# Patient Record
Sex: MALE | Race: WHITE | NOT HISPANIC OR LATINO | Employment: FULL TIME | URBAN - METROPOLITAN AREA
[De-identification: names, ages, dates, MRNs, and addresses within clinical notes are randomized per-mention and may not be internally consistent; named-entity substitution may affect disease eponyms.]

---

## 2017-01-13 ENCOUNTER — OFFICE VISIT (OUTPATIENT)
Dept: SURGERY | Facility: CLINIC | Age: 42
End: 2017-01-13

## 2017-01-13 VITALS
DIASTOLIC BLOOD PRESSURE: 88 MMHG | BODY MASS INDEX: 31.7 KG/M2 | SYSTOLIC BLOOD PRESSURE: 112 MMHG | WEIGHT: 214 LBS | HEIGHT: 69 IN

## 2017-01-13 DIAGNOSIS — R11.0 NAUSEA: ICD-10-CM

## 2017-01-13 DIAGNOSIS — R10.11 RIGHT UPPER QUADRANT ABDOMINAL PAIN: Primary | ICD-10-CM

## 2017-01-13 PROCEDURE — 99213 OFFICE O/P EST LOW 20 MIN: CPT | Performed by: SURGERY

## 2017-01-13 NOTE — LETTER
2017     Fernando Iverson MD  [address]  [facility] [address]    Patient: Elvis Vallejo   YOB: 1975   Date of Visit: 2017       Dear Dr. Kishor MD:    Thank you for referring Elvis Vallejo to me for evaluation. Below are the relevant portions of my assessment and plan of care.    If you have questions, please do not hesitate to call me. I look forward to following Elvis along with you.         Sincerely,        Tricia Addison MD        CC: No Recipients  Tricia Addison MD  2017  3:52 PM  Sign at close encounter      PATIENT INFORMATION  Elvis Vallejo   - 1975    CHIEF COMPLAINT  1 MO FU, still with abdominal pain and cramping.    Reflux - improved    HISTORY OF PRESENT ILLNESS  HPI    Patient presents to the office today for follow-up.  He has GERD and Guillermo's esophagitis.  He has been on Taxol and.  He reports he is completed Carafate.  He states that his reflux and nausea significantly improved and reflux symptoms are well-controlled with excellent.  He is still however experiencing right upper quadrant abdominal pain, bloating and cramping.  He does not associate any particular precipitating or alleviating factors.  There is no relation to food.  He reports regular bowel movements.  Denies melena, hematochezia or urinary symptoms.    REVIEW OF SYSTEMS  Review of Systems   Constitutional: Positive for appetite change. Negative for activity change and unexpected weight change.   HENT: Negative.    Respiratory: Negative.    Cardiovascular: Negative.    Gastrointestinal: Positive for abdominal distention and abdominal pain. Negative for blood in stool, constipation, diarrhea and nausea.   Genitourinary: Negative.    Musculoskeletal: Negative.          ACTIVE PROBLEMS  Patient Active Problem List    Diagnosis   • Right upper quadrant abdominal pain [R10.11]   • Nausea & vomiting [R11.2]   • Bloating [R14.0]   • Hypertension [I10]   • Epididymitis [N45.1]         PAST  "MEDICAL HISTORY  Past Medical History   Diagnosis Date   • GERD (gastroesophageal reflux disease)    • Hyperlipidemia    • Hypertension          SURGICAL HISTORY  Past Surgical History   Procedure Laterality Date   • [place] tooth extraction     • Endoscopy N/A 10/27/2016     Procedure: ESOPHAGOGASTRODUODENOSCOPY WITH BILE COLLECTION / RANDOM GASTRIC BIOPSIES,DUODENAL AND DISTAL ESOPHAGEAL BIOPSIES  AND ALFREDO TEST BIOPSY;  Surgeon: Tricia Addison MD;  Location: [facility];  Service:          FAMILY HISTORY  Family History   Problem Relation Age of Onset   • Heart attack Paternal Grandmother    • Hypertension Paternal Grandfather    • Heart attack Paternal Grandfather          SOCIAL HISTORY  Social History     Occupational History   • Not on file.     Social History Main Topics   • Smoking status: Light Tobacco Smoker     Years: 10.00     Types: Cigarettes   • Smokeless tobacco: Not on file      Comment: monthly   • Alcohol use 1.8 - 2.4 oz/week     3 - 4 Cans of beer per week      Comment: 3-4/WEEKS   • Drug use: No   • Sexual activity: Defer         CURRENT MEDICATIONS    Current Outpatient Prescriptions:   •  atorvastatin (LIPITOR) 20 MG tablet, Take 20 mg by mouth Daily., Disp: , Rfl:   •  Dexlansoprazole (DEXILANT PO), Take 60 mg by mouth Daily., Disp: , Rfl:   •  lisinopril-hydrochlorothiazide (PRINZIDE,ZESTORETIC) 20-12.5 MG per tablet, Take 1 tablet by mouth Daily., Disp: , Rfl:     ALLERGIES  Review of patient's allergies indicates no known allergies.    VITALS  Vitals:    01/13/17 0913   BP: 112/88   Weight: 214 lb (97.1 kg)   Height: 69\" (175.3 cm)       LAST RESULTS   Admission on 10/27/2016, Discharged on 10/27/2016   Component Date Value Ref Range Status   • Case Report 10/27/2016    Final                    Value:Surgical Pathology Report                         Case: [id number]                                  Authorizing Provider:  Tricia Addison MD       Collected:           10/27/2016 08:24 AM   "        Ordering Location:     [facility]   Received:            10/27/2016 09:19 AM                                 OR                                                                           Pathologist:           Sang Bentley MD                                                      Specimens:   1) - Esophagus, Distal, DISTAL ESOPHAGUS BIOPSY                                                     2) - Small Intestine, Duodenum, duodenum  biopsy                                                    3) - Gastric, Body, randum gastric biopsies                                               • Final Diagnosis 10/27/2016    Final                    Value:This result contains rich text formatting which cannot be displayed here.   • Urease 10/27/2016 Positive* Negative Final   • Crystals, Bile (Reference) 10/27/2016 Comment  None Seen Final    No crystals seen under normal or polarized light.     No results found.    PHYSICAL EXAM  Physical Exam   Constitutional: He is oriented to person, place, and time. He appears well-developed and well-nourished.   HENT:   Head: Normocephalic and atraumatic.   Eyes: No scleral icterus.   Neck: Normal range of motion. Neck supple.   Cardiovascular: Normal rate, regular rhythm and normal heart sounds.    Pulmonary/Chest: Breath sounds normal.   Abdominal:   Soft, nondistended, subjective tenderness to deep palpation right upper quadrant.  No Bustillo sign.  Positive bowel sounds in all 4 quadrants   Musculoskeletal: He exhibits no edema.   Lymphadenopathy:     He has no cervical adenopathy.   Neurological: He is alert and oriented to person, place, and time.   Skin: Skin is warm and dry.   Psychiatric: He has a normal mood and affect. His behavior is normal.   Nursing note and vitals reviewed.      ASSESSMENT  Abdominal pain-right upper quadrant  Ultrasound gallbladder negative.  HIDA scan with CCK showed gallbladder ejection fraction of 51%.  Have discussed with patient  will obtain CT scan abdomen pelvis with oral and IV contrast.       GERD  H pylori gastritis  Guillermo's esophagitis -- continue PPI.  GERD dietary last on modification discussed.  Caffeine, nicotine, alcohol cessation discussed.  Patient is agreeable.      PLAN  Follow-up after testing is completed.  Patient was advised to call the office sooner should he have worsening symptoms or go to the nearest emergency room.

## 2017-01-13 NOTE — MR AVS SNAPSHOT
Elvis Vallejo   1/13/2017 9:15 AM   Office Visit    Dept Phone:  [phone]   Encounter #:  [id number]    Provider:  Tricia Addison MD   Department:  [facility] GENERAL SURGERY                Your Full Care Plan              Your Updated Medication List          This list is accurate as of: 1/13/17  9:36 AM.  Always use your most recent med list.                atorvastatin 20 MG tablet   Commonly known as:  LIPITOR       DEXILANT PO       lisinopril-hydrochlorothiazide 20-12.5 MG per tablet   Commonly known as:  PRINZIDE,ZESTORETIC               Instructions     None    Patient Instructions History      Upcoming Appointments     Visit Type Date Time Department    OFFICE VISIT 1/13/2017  9:15 AM [facility]    OFFICE VISIT 1/20/2017 11:15 AM [facility]      [email or web address] Signup     Our records indicate that you have an active [employer] account.    You can view your After Visit Summary by going to [employer] and logging in with your [employer] username and password.  If you don't have a Treatful username and password but a parent or guardian has access to your record, the parent or guardian should login with their own Treatful username and password and access your record to view the After Visit Summary.    If you have questions, you can email [email or web address] or call [phone] to talk to our Treatful staff.  Remember, [employer] is NOT to be used for urgent needs.  For medical emergencies, dial 911.               Other Info from Your Visit           Your Appointments     Jan 20, 2017 11:15 AM EST   Office Visit with Tricia Addison MD   [facility] GENERAL SURGERY (--)    [address]  [place] [address] [phone]           Arrive 15 minutes prior to appointment.              Allergies     No Known Allergies      Reason for Visit     Follow-up           Vital Signs     Blood  "Pressure Height Weight Body Mass Index Smoking Status       112/88 69\" (175.3 cm) 214 lb (97.1 kg) 31.6 kg/m2 Light Tobacco Smoker       Problems and Diagnoses Noted     Epididymitis    High blood pressure        "

## 2017-01-13 NOTE — PROGRESS NOTES
PATIENT INFORMATION  Elvis Vallejo   - 1975    CHIEF COMPLAINT  1 MO FU, still with abdominal pain and cramping.    Reflux - improved    HISTORY OF PRESENT ILLNESS  HPI    Patient presents to the office today for follow-up.  He has GERD and Guillermo's esophagitis.  He has been on Taxol and.  He reports he is completed Carafate.  He states that his reflux and nausea significantly improved and reflux symptoms are well-controlled with excellent.  He is still however experiencing right upper quadrant abdominal pain, bloating and cramping.  He does not associate any particular precipitating or alleviating factors.  There is no relation to food.  He reports regular bowel movements.  Denies melena, hematochezia or urinary symptoms.    REVIEW OF SYSTEMS  Review of Systems   Constitutional: Positive for appetite change. Negative for activity change and unexpected weight change.   HENT: Negative.    Respiratory: Negative.    Cardiovascular: Negative.    Gastrointestinal: Positive for abdominal distention and abdominal pain. Negative for blood in stool, constipation, diarrhea and nausea.   Genitourinary: Negative.    Musculoskeletal: Negative.          ACTIVE PROBLEMS  Patient Active Problem List    Diagnosis   • Right upper quadrant abdominal pain [R10.11]   • Nausea & vomiting [R11.2]   • Bloating [R14.0]   • Hypertension [I10]   • Epididymitis [N45.1]         PAST MEDICAL HISTORY  Past Medical History   Diagnosis Date   • GERD (gastroesophageal reflux disease)    • Hyperlipidemia    • Hypertension          SURGICAL HISTORY  Past Surgical History   Procedure Laterality Date   • [place] tooth extraction     • Endoscopy N/A 10/27/2016     Procedure: ESOPHAGOGASTRODUODENOSCOPY WITH BILE COLLECTION / RANDOM GASTRIC BIOPSIES,DUODENAL AND DISTAL ESOPHAGEAL BIOPSIES  AND ALFREDO TEST BIOPSY;  Surgeon: Tricia Addison MD;  Location: [facility];  Service:          FAMILY HISTORY  Family History   Problem Relation Age of Onset  "  • Heart attack Paternal Grandmother    • Hypertension Paternal Grandfather    • Heart attack Paternal Grandfather          SOCIAL HISTORY  Social History     Occupational History   • Not on file.     Social History Main Topics   • Smoking status: Light Tobacco Smoker     Years: 10.00     Types: Cigarettes   • Smokeless tobacco: Not on file      Comment: monthly   • Alcohol use 1.8 - 2.4 oz/week     3 - 4 Cans of beer per week      Comment: 3-4/WEEKS   • Drug use: No   • Sexual activity: Defer         CURRENT MEDICATIONS    Current Outpatient Prescriptions:   •  atorvastatin (LIPITOR) 20 MG tablet, Take 20 mg by mouth Daily., Disp: , Rfl:   •  Dexlansoprazole (DEXILANT PO), Take 60 mg by mouth Daily., Disp: , Rfl:   •  lisinopril-hydrochlorothiazide (PRINZIDE,ZESTORETIC) 20-12.5 MG per tablet, Take 1 tablet by mouth Daily., Disp: , Rfl:     ALLERGIES  Review of patient's allergies indicates no known allergies.    VITALS  Vitals:    01/13/17 0913   BP: 112/88   Weight: 214 lb (97.1 kg)   Height: 69\" (175.3 cm)       LAST RESULTS   Admission on 10/27/2016, Discharged on 10/27/2016   Component Date Value Ref Range Status   • Case Report 10/27/2016    Final                    Value:Surgical Pathology Report                         Case: [id number]                                  Authorizing Provider:  Tricia Addison MD       Collected:           10/27/2016 08:24 AM          Ordering Location:     [facility]   Received:            10/27/2016 09:19 AM                                 OR                                                                           Pathologist:           Sang Bentley MD                                                      Specimens:   1) - Esophagus, Distal, DISTAL ESOPHAGUS BIOPSY                                                     2) - Small Intestine, Duodenum, duodenum  biopsy                                                    3) - Gastric, Body, randum gastric " biopsies                                               • Final Diagnosis 10/27/2016    Final                    Value:This result contains rich text formatting which cannot be displayed here.   • Urease 10/27/2016 Positive* Negative Final   • Crystals, Bile (Reference) 10/27/2016 Comment  None Seen Final    No crystals seen under normal or polarized light.     No results found.    PHYSICAL EXAM  Physical Exam   Constitutional: He is oriented to person, place, and time. He appears well-developed and well-nourished.   HENT:   Head: Normocephalic and atraumatic.   Eyes: No scleral icterus.   Neck: Normal range of motion. Neck supple.   Cardiovascular: Normal rate, regular rhythm and normal heart sounds.    Pulmonary/Chest: Breath sounds normal.   Abdominal:   Soft, nondistended, subjective tenderness to deep palpation right upper quadrant.  No Bustillo sign.  Positive bowel sounds in all 4 quadrants   Musculoskeletal: He exhibits no edema.   Lymphadenopathy:     He has no cervical adenopathy.   Neurological: He is alert and oriented to person, place, and time.   Skin: Skin is warm and dry.   Psychiatric: He has a normal mood and affect. His behavior is normal.   Nursing note and vitals reviewed.      ASSESSMENT  Abdominal pain-right upper quadrant  Ultrasound gallbladder negative.  HIDA scan with CCK showed gallbladder ejection fraction of 51%.  Have discussed with patient will obtain CT scan abdomen pelvis with oral and IV contrast.       GERD  H pylori gastritis  Guillermo's esophagitis -- continue PPI.  GERD dietary last on modification discussed.  Caffeine, nicotine, alcohol cessation discussed.  Patient is agreeable.      PLAN  Follow-up after testing is completed.  Patient was advised to call the office sooner should he have worsening symptoms or go to the nearest emergency room.

## 2017-01-23 ENCOUNTER — HOSPITAL ENCOUNTER (OUTPATIENT)
Dept: CT IMAGING | Facility: HOSPITAL | Age: 42
Discharge: HOME | End: 2017-01-23
Attending: SURGERY | Admitting: SURGERY

## 2017-01-23 DIAGNOSIS — R11.0 NAUSEA: ICD-10-CM

## 2017-01-23 DIAGNOSIS — R10.11 RIGHT UPPER QUADRANT ABDOMINAL PAIN: ICD-10-CM

## 2017-01-23 PROCEDURE — 25510000001 DIATRIZOATE MEGLUMINE & SODIUM PER 1 ML: Performed by: SURGERY

## 2017-01-23 PROCEDURE — 0 IOPAMIDOL 61 % SOLUTION: Performed by: SURGERY

## 2017-01-23 PROCEDURE — 74177 CT ABD & PELVIS W/CONTRAST: CPT

## 2017-01-23 PROCEDURE — 82565 ASSAY OF CREATININE: CPT

## 2017-01-23 RX ADMIN — IOPAMIDOL 85 ML: 612 INJECTION, SOLUTION INTRAVENOUS at 09:15

## 2017-01-23 RX ADMIN — DIATRIZOATE MEGLUMINE AND DIATRIZOATE SODIUM 30 ML: 660; 100 LIQUID ORAL; RECTAL at 08:00

## 2017-01-24 ENCOUNTER — OFFICE VISIT (OUTPATIENT)
Dept: SURGERY | Facility: CLINIC | Age: 42
End: 2017-01-24

## 2017-01-24 VITALS
HEIGHT: 69 IN | BODY MASS INDEX: 30.96 KG/M2 | DIASTOLIC BLOOD PRESSURE: 78 MMHG | WEIGHT: 209 LBS | SYSTOLIC BLOOD PRESSURE: 118 MMHG

## 2017-01-24 DIAGNOSIS — Z09 FOLLOW UP: Primary | ICD-10-CM

## 2017-01-24 LAB — CREAT BLDA-MCNC: 0.8 MG/DL (ref 0.6–1.3)

## 2017-01-24 PROCEDURE — 99213 OFFICE O/P EST LOW 20 MIN: CPT | Performed by: SURGERY

## 2017-01-24 RX ORDER — DICYCLOMINE HYDROCHLORIDE 10 MG/1
10 CAPSULE ORAL 4 TIMES DAILY
Qty: 120 CAPSULE | Refills: 0 | Status: SHIPPED | OUTPATIENT
Start: 2017-01-24 | End: 2017-02-23

## 2017-01-24 NOTE — MR AVS SNAPSHOT
Elvis Vallejo   1/24/2017 2:45 PM   Office Visit    Dept Phone:  [phone]   Encounter #:  [id number]    Provider:  Tricia Addison MD   Department:  [facility] GENERAL SURGERY                Your Full Care Plan              Today's Medication Changes          These changes are accurate as of: 1/24/17  3:27 PM.  If you have any questions, ask your nurse or doctor.               New Medication(s)Ordered:     dicyclomine 10 MG capsule   Commonly known as:  BENTYL   Take 1 capsule by mouth 4 (Four) Times a Day for 30 days.   Started by:  Tricia Addison MD            Where to Get Your Medications      These medications were sent to [id number] [place] [address] AT [facility] - [phone]  - [phone] [address] [address]     Phone:  [phone]     dicyclomine 10 MG capsule                  Your Updated Medication List          This list is accurate as of: 1/24/17  3:27 PM.  Always use your most recent med list.                atorvastatin 20 MG tablet   Commonly known as:  LIPITOR       DEXILANT PO       dicyclomine 10 MG capsule   Commonly known as:  BENTYL   Take 1 capsule by mouth 4 (Four) Times a Day for 30 days.       lisinopril-hydrochlorothiazide 20-12.5 MG per tablet   Commonly known as:  PRINZIDE,ZESTORETIC               Instructions     None    Patient Instructions History      Upcoming Appointments     Visit Type Date Time Department    FOLLOW UP 1/24/2017  2:45 PM [facility]      [employer] Signup     Our records indicate that you have an active O[employer]is[employer] account.    You can view your After Visit Summary by going to [employer] and logging in with your [employer] username and password.  If you don't have a [employer] username and password but a parent or guardian has access to your record, the parent or guardian should login with their own  "[employer] username and password and access your record to view the After Visit Summary.    If you have questions, you can email [email or web address] or call [phone] to talk to our [employer] staff.  Remember, [employer] is NOT to be used for urgent needs.  For medical emergencies, dial 911.               Other Info from Your Visit           Allergies     No Known Allergies      Reason for Visit     Follow-up           Vital Signs     Blood Pressure Height Weight Body Mass Index Smoking Status       118/78 69\" (175.3 cm) 209 lb (94.8 kg) 30.86 kg/m2 Light Tobacco Smoker         "

## 2017-01-24 NOTE — PROGRESS NOTES
PATIENT INFORMATION  Elvis Vallejo   - 1975    CHIEF COMPLAINT  F/U ABD PAIN, CT DONE, SX UNCHANGED SINCE LAST OV    HISTORY OF PRESENT ILLNESS  HPI  Patient was advised the office today to discuss CT scan results.  He is now complaining of generalized abdominal pain.  At the last office visit he was complaining of right upper quadrant abdominal pain along with bloating and indigestion.  Today he reports that the abdominal pain is mostly generalized, it is associated with bloating and indigestion.  He denies any nausea vomiting change in bowel movements melena hematochezia.  His reflux symptoms are significantly improved and well controlled under excellent.  He has significantly modified his diet and decreased his caffeine and alcohol/nicotine consumption.    CT scan was reviewed personally by me and I have discussed it in detail with the patient.  Essentially a negative study other than bilateral small renal cysts..      REVIEW OF SYSTEMS  Review of Systems   Constitutional: Negative.    Respiratory: Negative.    Cardiovascular: Negative.    Gastrointestinal: Positive for abdominal distention and abdominal pain.         ACTIVE PROBLEMS  Patient Active Problem List    Diagnosis   • Right upper quadrant abdominal pain [R10.11]   • Nausea & vomiting [R11.2]   • Bloating [R14.0]   • Hypertension [I10]   • Epididymitis [N45.1]         PAST MEDICAL HISTORY  Past Medical History   Diagnosis Date   • GERD (gastroesophageal reflux disease)    • Hyperlipidemia    • Hypertension          SURGICAL HISTORY  Past Surgical History   Procedure Laterality Date   • [place] tooth extraction     • Endoscopy N/A 10/27/2016     Procedure: ESOPHAGOGASTRODUODENOSCOPY WITH BILE COLLECTION / RANDOM GASTRIC BIOPSIES,DUODENAL AND DISTAL ESOPHAGEAL BIOPSIES  AND ALFREDO TEST BIOPSY;  Surgeon: Tricia Addison MD;  Location: [facility];  Service:          FAMILY HISTORY  Family History   Problem Relation Age of Onset   • Heart attack  "Paternal Grandmother    • Hypertension Paternal Grandfather    • Heart attack Paternal Grandfather          SOCIAL HISTORY  Social History     Occupational History   • Not on file.     Social History Main Topics   • Smoking status: Light Tobacco Smoker     Years: 10.00     Types: Cigarettes   • Smokeless tobacco: Not on file      Comment: monthly   • Alcohol use 1.8 - 2.4 oz/week     3 - 4 Cans of beer per week      Comment: 3-4/WEEKS   • Drug use: No   • Sexual activity: Defer         CURRENT MEDICATIONS    Current Outpatient Prescriptions:   •  atorvastatin (LIPITOR) 20 MG tablet, Take 20 mg by mouth Daily., Disp: , Rfl:   •  Dexlansoprazole (DEXILANT PO), Take 60 mg by mouth Daily., Disp: , Rfl:   •  dicyclomine (BENTYL) 10 MG capsule, Take 1 capsule by mouth 4 (Four) Times a Day for 30 days., Disp: 120 capsule, Rfl: 0  •  lisinopril-hydrochlorothiazide (PRINZIDE,ZESTORETIC) 20-12.5 MG per tablet, Take 1 tablet by mouth Daily., Disp: , Rfl:     ALLERGIES  Review of patient's allergies indicates no known allergies.    VITALS  Vitals:    01/24/17 1437   BP: 118/78   Weight: 209 lb (94.8 kg)   Height: 69\" (175.3 cm)       LAST RESULTS   Hospital Outpatient Visit on 01/23/2017   Component Date Value Ref Range Status   • Creatinine 01/23/2017 0.80  0.60 - 1.30 mg/dL Final    Serial Number: [id number]    : [id number]     Ct Abdomen Pelvis With Contrast    Result Date: 1/24/2017  Narrative: CT SCAN OF THE ABDOMEN AND PELVIS WITH CONTRAST  CLINICAL HISTORY: Right upper quadrant pain.  TECHNIQUE: Spiral CT images were acquired through the abdomen and pelvis with oral and IV contrast and were reconstructed in 3 mm thick axial slices.  COMPARISON: None  FINDINGS: The liver, spleen, pancreas, and adrenal glands are unremarkable. There is a small central cyst in the upper pole of the right kidney. Tiny cortical cyst is also noted in the lower pole of the left kidney. The kidneys are otherwise unremarkable. The gallbladder " appears within normal limits. There is no bile duct dilatation. The stomach and small and large bowel are unremarkable. No abnormal masses or fluid collections are identified in the abdomen and pelvis.      Impression: Small bilateral renal cysts. Otherwise unremarkable CT scan of the abdomen and pelvis. No acute process is identified.  This report was finalized on 1/24/2017 1:49 PM by Dr. Magen Stephenson MD.        PHYSICAL EXAM  Physical Exam   Constitutional: He is oriented to person, place, and time. He appears well-developed and well-nourished.   HENT:   Head: Normocephalic and atraumatic.   Eyes: EOM are normal. No scleral icterus.   Neck: Normal range of motion. Neck supple.   Cardiovascular: Normal rate, regular rhythm and normal heart sounds.    Pulmonary/Chest: Breath sounds normal.   Abdominal:   Soft, nondistended, nontender with bowel sounds in all 4 quadrants.  No peritoneal signs   Musculoskeletal: He exhibits no edema.   Lymphadenopathy:     He has no cervical adenopathy.   Neurological: He is alert and oriented to person, place, and time.   Nursing note and vitals reviewed.      ASSESSMENT  Abdominal pain -- cramping/spasms  Indigestion/bloating   Thus far workup negative.  Patient has had an ultrasound, HIDA scan with CCK stimulation and CT scan that have all been negative for any acute pathology.  We discussed a gastric emptying scan versus a trial of Bentyl.  Patient is agreeable to trial of Bentyl for one month.  Bentyl 10 mg by mouth 4 times a day, 30 day supply no refills was e- scribed to his pharmacy.    GERD  H pylori gastritis  Guillermo's esophagitis -- continue PPI.  GERD dietary last on modification discussed. Caffeine, nicotine, alcohol cessation discussed. Patient is agreeable.    PLAN  Follow-up one month.  Patient was advised to call the office sooner should he have worsening symptoms or go to the nearest emergency room.

## 2017-02-21 ENCOUNTER — TELEPHONE (OUTPATIENT)
Dept: SURGERY | Facility: CLINIC | Age: 42
End: 2017-02-21

## 2017-02-21 NOTE — TELEPHONE ENCOUNTER
I spoke with someone at Dr. Iverson's office, she stated she had tried to get a PA on this medication with no luck, but she was going to try and appeal it. I called the pt to let him know, had to leave a VM.    ----- Message from Tricia Addison MD sent at 2/21/2017  4:01 PM EST -----  Contact: [phone]  Since Dr Iverson has been prescribing it -- it would have to come from his office. Plz talk to their office  thx  ----- Message -----     From: Mackenzie Queen MA     Sent: 2/21/2017   2:53 PM       To: Tricia Addison MD    WOULD YOU BE WILLING TO PRESCRIBE THE DEXILANT?    ----- Message -----     From: Ronna Abernathy     Sent: 2/20/2017   3:23 PM       To: Mackenzie Queen MA    Patient said he needs a PA on his Dexilant. Dr. Iverson told him to call us and see if you would prescribe it to him and us get the PA.

## 2017-02-27 ENCOUNTER — OFFICE VISIT (OUTPATIENT)
Dept: SURGERY | Facility: CLINIC | Age: 42
End: 2017-02-27

## 2017-02-27 VITALS
BODY MASS INDEX: 31.1 KG/M2 | HEIGHT: 69 IN | SYSTOLIC BLOOD PRESSURE: 128 MMHG | WEIGHT: 210 LBS | DIASTOLIC BLOOD PRESSURE: 82 MMHG

## 2017-02-27 DIAGNOSIS — R10.9 ABDOMINAL CRAMPING: ICD-10-CM

## 2017-02-27 DIAGNOSIS — R10.84 GENERALIZED ABDOMINAL PAIN: Primary | ICD-10-CM

## 2017-02-27 PROCEDURE — 99213 OFFICE O/P EST LOW 20 MIN: CPT | Performed by: SURGERY

## 2017-02-27 NOTE — PROGRESS NOTES
PATIENT INFORMATION  Elvis Vallejo   - 1975    CHIEF COMPLAINT  1 MO FU, SX UNCHANGED      HISTORY OF PRESENT ILLNESS  HPI  Patient presents to the office today for follow-up.  He reports he is still complete experiencing abdominal pain, cramping and bloating he reports that he has been the using the been told that was prescribed to him last month but it doesn't seem to help.  He cannot associate any particular foods that may trigger his symptoms.  There are no aggravating or relieving factors.  He denies any nausea vomiting.  He does report that his bowel movements are irregular denies melena hematochezia.  As far as acid reflux is concerned is well-controlled on Dexilant.    He has modified his diet significantly and cut down on caffeine and nicotine and alcohol.  His major complaint is that of the cramping abdominal pain and bloating with the irregular bowel movements.  He has never had a colonoscopy before.  He reports he is unaware of any family history of colon cancer or colon polyps.    REVIEW OF SYSTEMS  Review of Systems   Constitutional: Negative for activity change, appetite change and unexpected weight change.   Respiratory: Negative.    Cardiovascular: Negative.    Gastrointestinal: Positive for abdominal distention, abdominal pain, constipation and diarrhea.   Endocrine: Negative.    Genitourinary: Negative.    Musculoskeletal: Negative.    Skin: Negative.    Neurological: Negative.    Hematological: Negative.    Psychiatric/Behavioral: Negative.          ACTIVE PROBLEMS  Patient Active Problem List    Diagnosis   • Right upper quadrant abdominal pain [R10.11]   • Nausea & vomiting [R11.2]   • Bloating [R14.0]   • Hypertension [I10]   • Epididymitis [N45.1]         PAST MEDICAL HISTORY  Past Medical History   Diagnosis Date   • GERD (gastroesophageal reflux disease)    • Hyperlipidemia    • Hypertension          SURGICAL HISTORY  Past Surgical History   Procedure Laterality Date   • [place]  "tooth extraction     • Endoscopy N/A 10/27/2016     Procedure: ESOPHAGOGASTRODUODENOSCOPY WITH BILE COLLECTION / RANDOM GASTRIC BIOPSIES,DUODENAL AND DISTAL ESOPHAGEAL BIOPSIES  AND ALFREDO TEST BIOPSY;  Surgeon: Tricia Addison MD;  Location: [facility];  Service:          FAMILY HISTORY  Family History   Problem Relation Age of Onset   • Heart attack Paternal Grandmother    • Hypertension Paternal Grandfather    • Heart attack Paternal Grandfather          SOCIAL HISTORY  Social History     Occupational History   • Not on file.     Social History Main Topics   • Smoking status: Light Tobacco Smoker     Years: 10.00     Types: Cigarettes   • Smokeless tobacco: Not on file      Comment: monthly   • Alcohol use 1.8 - 2.4 oz/week     3 - 4 Cans of beer per week      Comment: 3-4/WEEKS   • Drug use: No   • Sexual activity: Defer         CURRENT MEDICATIONS    Current Outpatient Prescriptions:   •  atorvastatin (LIPITOR) 20 MG tablet, Take 20 mg by mouth Daily., Disp: , Rfl:   •  Dexlansoprazole (DEXILANT PO), Take 60 mg by mouth Daily., Disp: , Rfl:   •  lisinopril-hydrochlorothiazide (PRINZIDE,ZESTORETIC) 20-12.5 MG per tablet, Take 1 tablet by mouth Daily., Disp: , Rfl:     ALLERGIES  Review of patient's allergies indicates no known allergies.    VITALS  Vitals:    02/27/17 0850   BP: 128/82   Weight: 210 lb (95.3 kg)   Height: 69\" (175.3 cm)       LAST RESULTS   Hospital Outpatient Visit on 01/23/2017   Component Date Value Ref Range Status   • Creatinine 01/23/2017 0.80  0.60 - 1.30 mg/dL Final    Serial Number: [id number]    : [id number]     No results found.    PHYSICAL EXAM  Physical Exam   Constitutional: He is oriented to person, place, and time. He appears well-developed and well-nourished.   HENT:   Head: Normocephalic and atraumatic.   Eyes: No scleral icterus.   Neck: Normal range of motion. Neck supple.   Cardiovascular: Normal rate, regular rhythm and normal heart sounds.    Pulmonary/Chest: Breath sounds " normal.   Abdominal:   Soft, nondistended, nontender positive bowel sounds in all 4 quadrants   Lymphadenopathy:     He has no cervical adenopathy.   Neurological: He is alert and oriented to person, place, and time.   Skin: Skin is warm and dry.   Psychiatric: He has a normal mood and affect. His behavior is normal.   Nursing note and vitals reviewed.      ASSESSMENT  Abdominal pain -- cramping/spasms  Indigestion/bloating   Thus far workup negative.(Patient has had an ultrasound, HIDA scan with CCK stimulation and CT scan that have all been negative for any acute pathology).     Patient reports Bentyl did not help either.  Had a lengthy discussion -- can continue to pursue this further with a colonoscopy and if that is negative gastric emptying scan.  If all workup is negative then I think the patient will need to be seen by GI to rule out irritable bowel syndrome.  I have discussed this with him in detail.  Pros and cons risks and benefits discussed he is agreeable and wants to proceed with colonoscopy at this time.  The procedure, risks, benefits, complications including but not limited to risk of bleeding, perforation requiring emergent procedures, post-polypectomy bleeding, post-polypectomy syndrome, cardiopulmonary complications were thoroughly discussed with him patient understands and gave verbal informed consent.  Colonoscopy will be scheduled [facility].  Bowel prep instructions were provided.  He will have to stay off all blood thinners for at least 5-7 days prior to the procedure.     GERD  H pylori gastritis  Guillermo's esophagitis -- continue PPI.  GERD dietary last on modification discussed. Caffeine, nicotine, alcohol cessation discussed. Patient is agreeable.      PLAN  Schedule colonoscopy [place].  Follow-up after colonoscopy.  Patient was advised to call the office sooner should he have worsening symptoms or go to the nearest emergency room.

## 2017-02-28 NOTE — H&P
PATIENT INFORMATION  Elvis Vallejo   - 1975    CHIEF COMPLAINT  1 MO FU, SX UNCHANGED      HISTORY OF PRESENT ILLNESS  HPI  Patient presents to the office today for follow-up.  He reports he is still complete experiencing abdominal pain, cramping and bloating he reports that he has been the using the been told that was prescribed to him last month but it doesn't seem to help.  He cannot associate any particular foods that may trigger his symptoms.  There are no aggravating or relieving factors.  He denies any nausea vomiting.  He does report that his bowel movements are irregular denies melena hematochezia.  As far as acid reflux is concerned is well-controlled on Dexilant.    He has modified his diet significantly and cut down on caffeine and nicotine and alcohol.  His major complaint is that of the cramping abdominal pain and bloating with the irregular bowel movements.  He has never had a colonoscopy before.  He reports he is unaware of any family history of colon cancer or colon polyps.    REVIEW OF SYSTEMS  Review of Systems   Constitutional: Negative for activity change, appetite change and unexpected weight change.   Respiratory: Negative.    Cardiovascular: Negative.    Gastrointestinal: Positive for abdominal distention, abdominal pain, constipation and diarrhea.   Endocrine: Negative.    Genitourinary: Negative.    Musculoskeletal: Negative.    Skin: Negative.    Neurological: Negative.    Hematological: Negative.    Psychiatric/Behavioral: Negative.          ACTIVE PROBLEMS  Patient Active Problem List    Diagnosis   • Right upper quadrant abdominal pain [R10.11]   • Nausea & vomiting [R11.2]   • Bloating [R14.0]   • Hypertension [I10]   • Epididymitis [N45.1]         PAST MEDICAL HISTORY  Past Medical History   Diagnosis Date   • GERD (gastroesophageal reflux disease)    • Hyperlipidemia    • Hypertension          SURGICAL HISTORY  Past Surgical History   Procedure Laterality Date   • [place]  "tooth extraction     • Endoscopy N/A 10/27/2016     Procedure: ESOPHAGOGASTRODUODENOSCOPY WITH BILE COLLECTION / RANDOM GASTRIC BIOPSIES,DUODENAL AND DISTAL ESOPHAGEAL BIOPSIES  AND ALFREDO TEST BIOPSY;  Surgeon: Tricia Addison MD;  Location: [facility];  Service:          FAMILY HISTORY  Family History   Problem Relation Age of Onset   • Heart attack Paternal Grandmother    • Hypertension Paternal Grandfather    • Heart attack Paternal Grandfather          SOCIAL HISTORY  Social History     Occupational History   • Not on file.     Social History Main Topics   • Smoking status: Light Tobacco Smoker     Years: 10.00     Types: Cigarettes   • Smokeless tobacco: Not on file      Comment: monthly   • Alcohol use 1.8 - 2.4 oz/week     3 - 4 Cans of beer per week      Comment: 3-4/WEEKS   • Drug use: No   • Sexual activity: Defer         CURRENT MEDICATIONS    Current Outpatient Prescriptions:   •  atorvastatin (LIPITOR) 20 MG tablet, Take 20 mg by mouth Daily., Disp: , Rfl:   •  Dexlansoprazole (DEXILANT PO), Take 60 mg by mouth Daily., Disp: , Rfl:   •  lisinopril-hydrochlorothiazide (PRINZIDE,ZESTORETIC) 20-12.5 MG per tablet, Take 1 tablet by mouth Daily., Disp: , Rfl:     ALLERGIES  Review of patient's allergies indicates no known allergies.    VITALS  Vitals:    02/27/17 0850   BP: 128/82   Weight: 210 lb (95.3 kg)   Height: 69\" (175.3 cm)       LAST RESULTS   Hospital Outpatient Visit on 01/23/2017   Component Date Value Ref Range Status   • Creatinine 01/23/2017 0.80  0.60 - 1.30 mg/dL Final    Serial Number: [id number]    : [id number]     No results found.    PHYSICAL EXAM  Physical Exam   Constitutional: He is oriented to person, place, and time. He appears well-developed and well-nourished.   HENT:   Head: Normocephalic and atraumatic.   Eyes: No scleral icterus.   Neck: Normal range of motion. Neck supple.   Cardiovascular: Normal rate, regular rhythm and normal heart sounds.    Pulmonary/Chest: Breath sounds " normal.   Abdominal:   Soft, nondistended, nontender positive bowel sounds in all 4 quadrants   Lymphadenopathy:     He has no cervical adenopathy.   Neurological: He is alert and oriented to person, place, and time.   Skin: Skin is warm and dry.   Psychiatric: He has a normal mood and affect. His behavior is normal.   Nursing note and vitals reviewed.      ASSESSMENT  Abdominal pain -- cramping/spasms  Indigestion/bloating   Thus far workup negative.(Patient has had an ultrasound, HIDA scan with CCK stimulation and CT scan that have all been negative for any acute pathology).     Patient reports Bentyl did not help either.  Had a lengthy discussion -- can continue to pursue this further with a colonoscopy and if that is negative gastric emptying scan.  If all workup is negative then I think the patient will need to be seen by GI to rule out irritable bowel syndrome.  I have discussed this with him in detail.  Pros and cons risks and benefits discussed he is agreeable and wants to proceed with colonoscopy at this time.  The procedure, risks, benefits, complications including but not limited to risk of bleeding, perforation requiring emergent procedures, post-polypectomy bleeding, post-polypectomy syndrome, cardiopulmonary complications were thoroughly discussed with him patient understands and gave verbal informed consent.  Colonoscopy will be scheduled [facility].  Bowel prep instructions were provided.  He will have to stay off all blood thinners for at least 5-7 days prior to the procedure.     GERD  H pylori gastritis  Guillermo's esophagitis -- continue PPI.  GERD dietary last on modification discussed. Caffeine, nicotine, alcohol cessation discussed. Patient is agreeable.      PLAN  Schedule colonoscopy [place].  Follow-up after colonoscopy.  Patient was advised to call the office sooner should he have worsening symptoms or go to the nearest emergency room.

## 2017-03-13 ENCOUNTER — ANESTHESIA EVENT (OUTPATIENT)
Dept: PERIOP | Facility: HOSPITAL | Age: 42
End: 2017-03-13

## 2017-03-14 ENCOUNTER — HOSPITAL ENCOUNTER (OUTPATIENT)
Facility: HOSPITAL | Age: 42
Setting detail: HOSPITAL OUTPATIENT SURGERY
Discharge: HOME | End: 2017-03-14
Attending: SURGERY | Admitting: SURGERY

## 2017-03-14 ENCOUNTER — ANESTHESIA (OUTPATIENT)
Dept: PERIOP | Facility: HOSPITAL | Age: 42
End: 2017-03-14

## 2017-03-14 VITALS
DIASTOLIC BLOOD PRESSURE: 98 MMHG | WEIGHT: 202.6 LBS | TEMPERATURE: 98 F | SYSTOLIC BLOOD PRESSURE: 135 MMHG | HEIGHT: 69 IN | OXYGEN SATURATION: 96 % | RESPIRATION RATE: 15 BRPM | BODY MASS INDEX: 30.01 KG/M2 | HEART RATE: 62 BPM

## 2017-03-14 DIAGNOSIS — R10.84 GENERALIZED ABDOMINAL PAIN: ICD-10-CM

## 2017-03-14 DIAGNOSIS — R10.9 ABDOMINAL CRAMPING: ICD-10-CM

## 2017-03-14 LAB — POTASSIUM BLD-SCNC: 3.9 MMOL/L (ref 3.5–5.2)

## 2017-03-14 PROCEDURE — 84132 ASSAY OF SERUM POTASSIUM: CPT | Performed by: NURSE ANESTHETIST, CERTIFIED REGISTERED

## 2017-03-14 PROCEDURE — 25010000002 PROPOFOL 10 MG/ML EMULSION: Performed by: ANESTHESIOLOGY

## 2017-03-14 PROCEDURE — 45380 COLONOSCOPY AND BIOPSY: CPT | Performed by: SURGERY

## 2017-03-14 RX ORDER — LIDOCAINE HYDROCHLORIDE 20 MG/ML
INJECTION, SOLUTION INFILTRATION; PERINEURAL AS NEEDED
Status: DISCONTINUED | OUTPATIENT
Start: 2017-03-14 | End: 2017-03-14 | Stop reason: SURG

## 2017-03-14 RX ORDER — SODIUM CHLORIDE 0.9 % (FLUSH) 0.9 %
1-10 SYRINGE (ML) INJECTION AS NEEDED
Status: DISCONTINUED | OUTPATIENT
Start: 2017-03-14 | End: 2017-03-14 | Stop reason: HOSPADM

## 2017-03-14 RX ORDER — SODIUM CHLORIDE, SODIUM LACTATE, POTASSIUM CHLORIDE, CALCIUM CHLORIDE 600; 310; 30; 20 MG/100ML; MG/100ML; MG/100ML; MG/100ML
9 INJECTION, SOLUTION INTRAVENOUS CONTINUOUS PRN
Status: DISCONTINUED | OUTPATIENT
Start: 2017-03-14 | End: 2017-03-14 | Stop reason: HOSPADM

## 2017-03-14 RX ORDER — MELOXICAM 15 MG/1
15 TABLET ORAL DAILY PRN
COMMUNITY
End: 2017-03-14 | Stop reason: HOSPADM

## 2017-03-14 RX ORDER — MAGNESIUM HYDROXIDE 1200 MG/15ML
LIQUID ORAL AS NEEDED
Status: DISCONTINUED | OUTPATIENT
Start: 2017-03-14 | End: 2017-03-14 | Stop reason: HOSPADM

## 2017-03-14 RX ORDER — PANTOPRAZOLE SODIUM 40 MG/1
40 TABLET, DELAYED RELEASE ORAL DAILY
Qty: 30 TABLET | Refills: 0 | Status: SHIPPED | OUTPATIENT
Start: 2017-03-14 | End: 2017-03-31 | Stop reason: SDUPTHER

## 2017-03-14 RX ORDER — GLYCOPYRROLATE 0.2 MG/ML
INJECTION INTRAMUSCULAR; INTRAVENOUS AS NEEDED
Status: DISCONTINUED | OUTPATIENT
Start: 2017-03-14 | End: 2017-03-14 | Stop reason: SURG

## 2017-03-14 RX ORDER — PROPOFOL 10 MG/ML
VIAL (ML) INTRAVENOUS AS NEEDED
Status: DISCONTINUED | OUTPATIENT
Start: 2017-03-14 | End: 2017-03-14 | Stop reason: SURG

## 2017-03-14 RX ORDER — LIDOCAINE HYDROCHLORIDE 10 MG/ML
0.5 INJECTION, SOLUTION EPIDURAL; INFILTRATION; INTRACAUDAL; PERINEURAL ONCE AS NEEDED
Status: COMPLETED | OUTPATIENT
Start: 2017-03-14 | End: 2017-03-14

## 2017-03-14 RX ADMIN — PROPOFOL 500 MG: 10 INJECTION, EMULSION INTRAVENOUS at 09:25

## 2017-03-14 RX ADMIN — LIDOCAINE HYDROCHLORIDE 40 MG: 20 INJECTION, SOLUTION INFILTRATION; PERINEURAL at 09:25

## 2017-03-14 RX ADMIN — SODIUM CHLORIDE, POTASSIUM CHLORIDE, SODIUM LACTATE AND CALCIUM CHLORIDE 9 ML/HR: 600; 310; 30; 20 INJECTION, SOLUTION INTRAVENOUS at 09:20

## 2017-03-14 RX ADMIN — LIDOCAINE HYDROCHLORIDE 0.5 ML: 10 INJECTION, SOLUTION EPIDURAL; INFILTRATION; INTRACAUDAL; PERINEURAL at 09:20

## 2017-03-14 RX ADMIN — GLYCOPYRROLATE 0.1 MG: 0.2 INJECTION INTRAMUSCULAR; INTRAVENOUS at 09:25

## 2017-03-14 RX ADMIN — SODIUM CHLORIDE, POTASSIUM CHLORIDE, SODIUM LACTATE AND CALCIUM CHLORIDE: 600; 310; 30; 20 INJECTION, SOLUTION INTRAVENOUS at 09:21

## 2017-03-14 NOTE — PLAN OF CARE
Problem: Patient Care Overview (Adult)  Goal: Plan of Care Review  Outcome: Outcome(s) achieved Date Met:  03/14/17 03/14/17 1031   Coping/Psychosocial Response Interventions   Plan Of Care Reviewed With patient   Patient Care Overview   Progress improving   Outcome Evaluation   Outcome Summary/Follow up Plan vss, waiting to go home

## 2017-03-14 NOTE — ANESTHESIA PREPROCEDURE EVALUATION
Anesthesia Evaluation     Patient summary reviewed and Nursing notes reviewed   no history of anesthetic complications:  NPO Status: > 8 hours   Airway   Mallampati: III  TM distance: >3 FB  Neck ROM: full  possible difficult intubation  Dental - normal exam     Pulmonary - negative pulmonary ROS and normal exam   Sleep apnea: snores.  Cardiovascular   Exercise tolerance: good (4-7 METS)    Rhythm: regular  Rate: normal    (+) hypertension well controlled,     ROS comment: hyperlipidemia    Neuro/Psych- negative ROS  GI/Hepatic/Renal/Endo    (+)  GERD well controlled,     Musculoskeletal     Abdominal  - normal exam   Substance History - negative use     OB/GYN negative ob/gyn ROS         Other   (+) arthritis (knees, off meloxicam for 7 days)                                 Anesthesia Plan    ASA 2     MAC     intravenous induction   Anesthetic plan and risks discussed with patient and spouse/significant other.

## 2017-03-14 NOTE — PLAN OF CARE
Problem: GI Endoscopy (Adult)  Goal: Signs and Symptoms of Listed Potential Problems Will be Absent or Manageable (GI Endoscopy)  Outcome: Outcome(s) achieved Date Met:  03/14/17 03/14/17 1032   GI Endoscopy   Problems Assessed (GI Endoscopy) all   Problems Present (GI Endoscopy) none

## 2017-03-14 NOTE — PLAN OF CARE
Problem: Patient Care Overview (Adult)  Goal: Adult Individualization and Mutuality  Outcome: Outcome(s) achieved Date Met:  03/14/17 03/14/17 1031   Individualization   Patient Specific Preferences none

## 2017-03-14 NOTE — H&P (VIEW-ONLY)
PATIENT INFORMATION  Elvis Vallejo   - 1975    CHIEF COMPLAINT  1 MO FU, SX UNCHANGED      HISTORY OF PRESENT ILLNESS  HPI  Patient presents to the office today for follow-up.  He reports he is still complete experiencing abdominal pain, cramping and bloating he reports that he has been the using the been told that was prescribed to him last month but it doesn't seem to help.  He cannot associate any particular foods that may trigger his symptoms.  There are no aggravating or relieving factors.  He denies any nausea vomiting.  He does report that his bowel movements are irregular denies melena hematochezia.  As far as acid reflux is concerned is well-controlled on Dexilant.    He has modified his diet significantly and cut down on caffeine and nicotine and alcohol.  His major complaint is that of the cramping abdominal pain and bloating with the irregular bowel movements.  He has never had a colonoscopy before.  He reports he is unaware of any family history of colon cancer or colon polyps.    REVIEW OF SYSTEMS  Review of Systems   Constitutional: Negative for activity change, appetite change and unexpected weight change.   Respiratory: Negative.    Cardiovascular: Negative.    Gastrointestinal: Positive for abdominal distention, abdominal pain, constipation and diarrhea.   Endocrine: Negative.    Genitourinary: Negative.    Musculoskeletal: Negative.    Skin: Negative.    Neurological: Negative.    Hematological: Negative.    Psychiatric/Behavioral: Negative.          ACTIVE PROBLEMS  Patient Active Problem List    Diagnosis   • Right upper quadrant abdominal pain [R10.11]   • Nausea & vomiting [R11.2]   • Bloating [R14.0]   • Hypertension [I10]   • Epididymitis [N45.1]         PAST MEDICAL HISTORY  Past Medical History   Diagnosis Date   • GERD (gastroesophageal reflux disease)    • Hyperlipidemia    • Hypertension          SURGICAL HISTORY  Past Surgical History   Procedure Laterality Date   • [place]  "tooth extraction     • Endoscopy N/A 10/27/2016     Procedure: ESOPHAGOGASTRODUODENOSCOPY WITH BILE COLLECTION / RANDOM GASTRIC BIOPSIES,DUODENAL AND DISTAL ESOPHAGEAL BIOPSIES  AND ALFREDO TEST BIOPSY;  Surgeon: Tricia Addison MD;  Location: [facility];  Service:          FAMILY HISTORY  Family History   Problem Relation Age of Onset   • Heart attack Paternal Grandmother    • Hypertension Paternal Grandfather    • Heart attack Paternal Grandfather          SOCIAL HISTORY  Social History     Occupational History   • Not on file.     Social History Main Topics   • Smoking status: Light Tobacco Smoker     Years: 10.00     Types: Cigarettes   • Smokeless tobacco: Not on file      Comment: monthly   • Alcohol use 1.8 - 2.4 oz/week     3 - 4 Cans of beer per week      Comment: 3-4/WEEKS   • Drug use: No   • Sexual activity: Defer         CURRENT MEDICATIONS    Current Outpatient Prescriptions:   •  atorvastatin (LIPITOR) 20 MG tablet, Take 20 mg by mouth Daily., Disp: , Rfl:   •  Dexlansoprazole (DEXILANT PO), Take 60 mg by mouth Daily., Disp: , Rfl:   •  lisinopril-hydrochlorothiazide (PRINZIDE,ZESTORETIC) 20-12.5 MG per tablet, Take 1 tablet by mouth Daily., Disp: , Rfl:     ALLERGIES  Review of patient's allergies indicates no known allergies.    VITALS  Vitals:    02/27/17 0850   BP: 128/82   Weight: 210 lb (95.3 kg)   Height: 69\" (175.3 cm)       LAST RESULTS   Hospital Outpatient Visit on 01/23/2017   Component Date Value Ref Range Status   • Creatinine 01/23/2017 0.80  0.60 - 1.30 mg/dL Final    Serial Number: [id number]    : [id number]     No results found.    PHYSICAL EXAM  Physical Exam   Constitutional: He is oriented to person, place, and time. He appears well-developed and well-nourished.   HENT:   Head: Normocephalic and atraumatic.   Eyes: No scleral icterus.   Neck: Normal range of motion. Neck supple.   Cardiovascular: Normal rate, regular rhythm and normal heart sounds.    Pulmonary/Chest: Breath sounds " normal.   Abdominal:   Soft, nondistended, nontender positive bowel sounds in all 4 quadrants   Lymphadenopathy:     He has no cervical adenopathy.   Neurological: He is alert and oriented to person, place, and time.   Skin: Skin is warm and dry.   Psychiatric: He has a normal mood and affect. His behavior is normal.   Nursing note and vitals reviewed.      ASSESSMENT  Abdominal pain -- cramping/spasms  Indigestion/bloating   Thus far workup negative.(Patient has had an ultrasound, HIDA scan with CCK stimulation and CT scan that have all been negative for any acute pathology).     Patient reports Bentyl did not help either.  Had a lengthy discussion -- can continue to pursue this further with a colonoscopy and if that is negative gastric emptying scan.  If all workup is negative then I think the patient will need to be seen by GI to rule out irritable bowel syndrome.  I have discussed this with him in detail.  Pros and cons risks and benefits discussed he is agreeable and wants to proceed with colonoscopy at this time.  The procedure, risks, benefits, complications including but not limited to risk of bleeding, perforation requiring emergent procedures, post-polypectomy bleeding, post-polypectomy syndrome, cardiopulmonary complications were thoroughly discussed with him patient understands and gave verbal informed consent.  Colonoscopy will be scheduled [facility].  Bowel prep instructions were provided.  He will have to stay off all blood thinners for at least 5-7 days prior to the procedure.     GERD  H pylori gastritis  Guillermo's esophagitis -- continue PPI.  GERD dietary last on modification discussed. Caffeine, nicotine, alcohol cessation discussed. Patient is agreeable.      PLAN  Schedule colonoscopy [place].  Follow-up after colonoscopy.  Patient was advised to call the office sooner should he have worsening symptoms or go to the nearest emergency room.

## 2017-03-14 NOTE — OP NOTE
Colonoscopy Procedure Note  Date of procedure 3/14/17    Pre-operative Diagnosis:  Abdominal pain           Abdominal cramping    Post-operative Diagnosis: Diverticulosis            Colon polyps    Procedure: Colonoscopy with polypectomy    Surgeon: Tricia Addison M.D.    Anesthetic: MAC per Edna Vanessa M.D.    EBL : Minimal    Complications : None    Indications:  Patient is a 41-year-old male referred to general surgery with the aforementioned complaints as part of his workup he was advised to undergo colonoscopy.  Procedures, risks, complications including but not limited to risk of bleeding, infection, perforation requiring additional emergent procedures as well as cardiopulmonary complications were thoroughly discussed with the patient understood and gave informed consent.    Findings/Treatments: Multiple polyps noted in the sigmoid colon and one in transverse colon.  Polypectomies performed using cold biopsy forceps.  Diverticulosis noted in the sigmoid and descending colon with no associated complications       Scope Withdrawal Time:  Greater than 6 minutes      Recommendations:  Will await pathology.  Diverticulosis-educational materials and high-fiber instruction sheet provided  Will need repeat colonoscopy in 5 years but will make final recommendations once  pathology results available    Procedure Details     After discussing the benefits and risks of the procedure with the patient, not limited to but including:  Bleeding, infection, perforation, aspiration; informed consent was signed.  The patient was taken into the endoscopy room at [facility] and placed in the left lateral decubitus position.  MAC anesthesia was given with appropriate cardiopulmonary monitoring.  A rectal exam was performed.  Sphincter tone was normal.  The colonoscope was then inserted and carefully advanced to the cecum while visualizing the mucosa.  Colon preparation was fair.  The cecum was identified by the anatomic  landmarks i.e. the cecal strap, ileocecal valve and the orifice of the appendix.  Scope was gradually withdrawn carefully evaluating the colon mucosa in a circumferential fashion with findings as follows: Cecum, appendiceal orifice, ileocecal valve, ascending colon, hepatic flexure no gross pathology noted.  Scope was brought back into the transverse colon-distal transverse colon at 90 sessile 3 mm polyp noted completely removed using cold biopsy forceps technique.  EBL minimal hemostasis assured.  Was brought back into the splenic flexure and then descending colon were multiple diverticular pockets noted with no associated bleeding or complication.  Scope was then brought back into the sigmoid colon.  Distal sigmoid colon at 30 cm 5 sessile polyps noted each ranging from 2-5 mm.  All removed and retrieved using cold biopsy forceps.  EBL minimal hemostasis assured.  Copious brought back into the rectum.  Retroflex examination performed.  No internal hemorrhoids noted.  Scope was then straightened out and removed from the patient was desufflating the colon.    Patient was awakened, his anesthesia reversed and he was taken to recovery room in stable condition having tolerated his procedure well with no immediate apparent complications.    Tricia Addison MD

## 2017-03-14 NOTE — ANESTHESIA POSTPROCEDURE EVALUATION
Patient: Elvis Vallejo    Procedure Summary     Date Anesthesia Start Anesthesia Stop Room / Location    03/14/17 0921 1003 [facility] / [facility]       Procedure Diagnosis Surgeon Provider    COLONOSCOPY with polypectomy  (N/A ) Colon polyps; Diverticulosis  (Generalized abdominal pain [R10.84]; Abdominal cramping [R10.9]) MD Edna Beth MD          Anesthesia Type: MAC  Last vitals  /98 (03/14/17 1035)    Temp 98 °F (36.7 °C) (03/14/17 1005)    Pulse 62 (03/14/17 1025)   Resp 15 (03/14/17 1035)    SpO2 96 % (03/14/17 1035)      Post Anesthesia Care and Evaluation    Patient location during evaluation: bedside  Patient participation: complete - patient participated  Level of consciousness: awake and alert  Pain management: adequate  Airway patency: patent  Anesthetic complications: No anesthetic complications  PONV Status: none  Cardiovascular status: acceptable  Respiratory status: acceptable  Hydration status: acceptable

## 2017-03-16 ENCOUNTER — HOSPITAL ENCOUNTER (EMERGENCY)
Facility: HOSPITAL | Age: 42
Discharge: HOME | End: 2017-03-16
Attending: EMERGENCY MEDICINE | Admitting: EMERGENCY MEDICINE

## 2017-03-16 ENCOUNTER — TELEPHONE (OUTPATIENT)
Dept: SURGERY | Facility: CLINIC | Age: 42
End: 2017-03-16

## 2017-03-16 ENCOUNTER — APPOINTMENT (OUTPATIENT)
Dept: CT IMAGING | Facility: HOSPITAL | Age: 42
End: 2017-03-16

## 2017-03-16 VITALS
SYSTOLIC BLOOD PRESSURE: 131 MMHG | BODY MASS INDEX: 32.58 KG/M2 | HEART RATE: 75 BPM | DIASTOLIC BLOOD PRESSURE: 93 MMHG | WEIGHT: 220 LBS | TEMPERATURE: 98.3 F | OXYGEN SATURATION: 97 % | RESPIRATION RATE: 15 BRPM | HEIGHT: 69 IN

## 2017-03-16 DIAGNOSIS — K62.89 ANAL OR RECTAL PAIN: Primary | ICD-10-CM

## 2017-03-16 LAB
ANION GAP SERPL CALCULATED.3IONS-SCNC: 9.6 MMOL/L
BASOPHILS # BLD AUTO: 0.04 10*3/MM3 (ref 0–0.2)
BASOPHILS NFR BLD AUTO: 0.6 % (ref 0–2)
BUN BLD-MCNC: 15 MG/DL (ref 6–20)
BUN/CREAT SERPL: 15 (ref 7–25)
CALCIUM SPEC-SCNC: 9.1 MG/DL (ref 8.6–10.5)
CHLORIDE SERPL-SCNC: 95 MMOL/L (ref 98–107)
CO2 SERPL-SCNC: 27.4 MMOL/L (ref 22–29)
CREAT BLD-MCNC: 1 MG/DL (ref 0.76–1.27)
DEPRECATED RDW RBC AUTO: 40.2 FL (ref 37–54)
EOSINOPHIL # BLD AUTO: 0.2 10*3/MM3 (ref 0.1–0.3)
EOSINOPHIL NFR BLD AUTO: 2.9 % (ref 0–4)
ERYTHROCYTE [DISTWIDTH] IN BLOOD BY AUTOMATED COUNT: 12 % (ref 11.5–14.5)
GFR SERPL CREATININE-BSD FRML MDRD: 83 ML/MIN/1.73
GLUCOSE BLD-MCNC: 104 MG/DL (ref 65–99)
HCT VFR BLD AUTO: 45 % (ref 42–52)
HGB BLD-MCNC: 14.8 G/DL (ref 14–18)
IMM GRANULOCYTES # BLD: 0.03 10*3/MM3 (ref 0–0.03)
IMM GRANULOCYTES NFR BLD: 0.4 % (ref 0–0.5)
LAB AP CASE REPORT: NORMAL
LYMPHOCYTES # BLD AUTO: 1.82 10*3/MM3 (ref 0.6–4.8)
LYMPHOCYTES NFR BLD AUTO: 26.8 % (ref 20–45)
Lab: NORMAL
MCH RBC QN AUTO: 30 PG (ref 27–31)
MCHC RBC AUTO-ENTMCNC: 32.9 G/DL (ref 31–37)
MCV RBC AUTO: 91.1 FL (ref 80–94)
MONOCYTES # BLD AUTO: 0.7 10*3/MM3 (ref 0–1)
MONOCYTES NFR BLD AUTO: 10.3 % (ref 3–8)
NEUTROPHILS # BLD AUTO: 4.01 10*3/MM3 (ref 1.5–8.3)
NEUTROPHILS NFR BLD AUTO: 59 % (ref 45–70)
NRBC BLD MANUAL-RTO: 0 /100 WBC (ref 0–0)
PATH REPORT.FINAL DX SPEC: NORMAL
PLATELET # BLD AUTO: 236 10*3/MM3 (ref 140–500)
PMV BLD AUTO: 9.6 FL (ref 7.4–10.4)
POTASSIUM BLD-SCNC: 4 MMOL/L (ref 3.5–5.2)
RBC # BLD AUTO: 4.94 10*6/MM3 (ref 4.7–6.1)
SODIUM BLD-SCNC: 132 MMOL/L (ref 136–145)
WBC NRBC COR # BLD: 6.8 10*3/MM3 (ref 4.8–10.8)

## 2017-03-16 PROCEDURE — 0 IOPAMIDOL PER 1 ML: Performed by: EMERGENCY MEDICINE

## 2017-03-16 PROCEDURE — 74177 CT ABD & PELVIS W/CONTRAST: CPT

## 2017-03-16 PROCEDURE — 99282 EMERGENCY DEPT VISIT SF MDM: CPT | Performed by: EMERGENCY MEDICINE

## 2017-03-16 PROCEDURE — 80048 BASIC METABOLIC PNL TOTAL CA: CPT | Performed by: EMERGENCY MEDICINE

## 2017-03-16 PROCEDURE — 99283 EMERGENCY DEPT VISIT LOW MDM: CPT

## 2017-03-16 PROCEDURE — 85025 COMPLETE CBC W/AUTO DIFF WBC: CPT | Performed by: EMERGENCY MEDICINE

## 2017-03-16 RX ORDER — SODIUM CHLORIDE 0.9 % (FLUSH) 0.9 %
10 SYRINGE (ML) INJECTION AS NEEDED
Status: DISCONTINUED | OUTPATIENT
Start: 2017-03-16 | End: 2017-03-16 | Stop reason: HOSPADM

## 2017-03-16 RX ORDER — HYDROCORTISONE ACETATE 25 MG/1
25 SUPPOSITORY RECTAL 2 TIMES DAILY
Qty: 10 SUPPOSITORY | Refills: 0 | Status: SHIPPED | OUTPATIENT
Start: 2017-03-16 | End: 2017-03-31

## 2017-03-16 RX ADMIN — IOPAMIDOL 100 ML: 755 INJECTION, SOLUTION INTRAVENOUS at 20:38

## 2017-03-16 NOTE — ED NOTES
Pt states that he sees some blood when he wipes.  He has pain on the left side of his rectum where a hemrrhoid is.  He denies nausea or vomiting.  He is a little constipated.     Tamara Kinney RN  03/16/17 1827

## 2017-03-16 NOTE — ED NOTES
Colonoscopy Tuesday, had 6 polyps removed, has irritation and notices blood when wipes with toilet paper/ called DR Valenzuela today and referred here     Adrienne Soares RN  03/16/17 1800       Adrienne Soares RN  03/16/17 1802

## 2017-03-16 NOTE — ED PROVIDER NOTES
Subjective   History of Present Illness  History of Present Illness    Chief complaint: Rectal pain and bleeding    Location: Rectum, left side    Quality/Severity:  Moderate    Timing/Duration: 2 days    Modifying Factors: Worse with stooling    Associated Symptoms: Denies fever, denies change in abdominal pain    Narrative: 41-year-old male being treated as IBS had colonoscopy 2 days ago and has had some bright red blood per rectum with painful bowel movements since then.  He presents the emergency department for evaluation of possible complication related to polypectomy.    Review of Systems  Denies fever and chills.  No dysuria.  No change in baseline abdominal pain.  No nausea or vomiting.  All systems reviewed and otherwise negative.  Past Medical History   Diagnosis Date   • Guillermo's esophagus    • GERD (gastroesophageal reflux disease)    • Hyperlipidemia    • Hypertension        No Known Allergies    Past Surgical History   Procedure Laterality Date   • [place] tooth extraction     • Endoscopy N/A 10/27/2016     Procedure: ESOPHAGOGASTRODUODENOSCOPY WITH BILE COLLECTION / RANDOM GASTRIC BIOPSIES,DUODENAL AND DISTAL ESOPHAGEAL BIOPSIES  AND ALFREDO TEST BIOPSY;  Surgeon: Tricia Addison MD;  Location: [facility] OR;  Service:    • Colonoscopy N/A 3/14/2017     Procedure: COLONOSCOPY with polypectomy ;  Surgeon: Tricia Addison MD;  Location: [facility] OR;  Service:        Family History   Problem Relation Age of Onset   • Heart attack Paternal Grandmother    • Hypertension Paternal Grandfather    • Heart attack Paternal Grandfather        Social History     Social History   • Marital status:      Spouse name: N/A   • Number of children: N/A   • Years of education: N/A     Social History Main Topics   • Smoking status: Light Tobacco Smoker     Years: 10.00     Types: Cigarettes, Cigars   • Smokeless tobacco: None      Comment: monthly   • Alcohol use 1.8 - 2.4 oz/week     3 - 4 Cans of beer per week       Comment: 3-4 times a week   • Drug use: No   • Sexual activity: Defer     Other Topics Concern   • None     Social History Narrative     ED Triage Vitals   Temp Heart Rate Resp BP SpO2   03/16/17 1801 03/16/17 1801 03/16/17 1801 03/16/17 1801 03/16/17 1801   98.3 °F (36.8 °C) 75 15 131/93 97 %      Temp src Heart Rate Source Patient Position BP Location FiO2 (%)   03/16/17 1801 03/16/17 1801 -- -- --   Oral Monitor        Objective   Physical Exam   Constitutional: He is oriented to person, place, and time. He appears well-developed. No distress.   HENT:   Head: Normocephalic.   Mouth/Throat: Oropharynx is clear and moist.   Eyes: Conjunctivae are normal. No scleral icterus.   Neck: Neck supple.   Painless movement   Cardiovascular: Normal rate and regular rhythm.    Pulmonary/Chest: Effort normal and breath sounds normal. No respiratory distress.   Abdominal: Soft. There is no tenderness.   Mild diffuse discomfort without focal tenderness to palpation.  No rigidity or rebound.   Genitourinary:   Genitourinary Comments: Digital rectal exam: Mild discomfort with digital rectal exam here is no gross blood.  No focal palpable abscess.  No external skin change of the rectum.   Musculoskeletal:   MAEE, normal strength   Neurological: He is alert and oriented to person, place, and time.   Skin: Skin is warm and dry.   Psychiatric: He has a normal mood and affect. Thought content normal.   Nursing note and vitals reviewed.      Procedures    Results for orders placed or performed during the hospital encounter of 03/16/17   Basic Metabolic Panel   Result Value Ref Range    Glucose 104 (H) 65 - 99 mg/dL    BUN 15 6 - 20 mg/dL    Creatinine 1.00 0.76 - 1.27 mg/dL    Sodium 132 (L) 136 - 145 mmol/L    Potassium 4.0 3.5 - 5.2 mmol/L    Chloride 95 (L) 98 - 107 mmol/L    CO2 27.4 22.0 - 29.0 mmol/L    Calcium 9.1 8.6 - 10.5 mg/dL    eGFR Non African Amer 83 >60 mL/min/1.73    BUN/Creatinine Ratio 15.0 7.0 - 25.0    Anion Gap 9.6  mmol/L   CBC Auto Differential   Result Value Ref Range    WBC 6.80 4.80 - 10.80 10*3/mm3    RBC 4.94 4.70 - 6.10 10*6/mm3    Hemoglobin 14.8 14.0 - 18.0 g/dL    Hematocrit 45.0 42.0 - 52.0 %    MCV 91.1 80.0 - 94.0 fL    MCH 30.0 27.0 - 31.0 pg    MCHC 32.9 31.0 - 37.0 g/dL    RDW 12.0 11.5 - 14.5 %    RDW-SD 40.2 37.0 - 54.0 fl    MPV 9.6 7.4 - 10.4 fL    Platelets 236 140 - 500 10*3/mm3    Neutrophil % 59.0 45.0 - 70.0 %    Lymphocyte % 26.8 20.0 - 45.0 %    Monocyte % 10.3 (H) 3.0 - 8.0 %    Eosinophil % 2.9 0.0 - 4.0 %    Basophil % 0.6 0.0 - 2.0 %    Immature Grans % 0.4 0.0 - 0.5 %    Neutrophils, Absolute 4.01 1.50 - 8.30 10*3/mm3    Lymphocytes, Absolute 1.82 0.60 - 4.80 10*3/mm3    Monocytes, Absolute 0.70 0.00 - 1.00 10*3/mm3    Eosinophils, Absolute 0.20 0.10 - 0.30 10*3/mm3    Basophils, Absolute 0.04 0.00 - 0.20 10*3/mm3    Immature Grans, Absolute 0.03 0.00 - 0.03 10*3/mm3    nRBC 0.0 0.0 - 0.0 /100 WBC     RADIOLOGY        Study: CT abd/Pelvis c IV contrast    Findings: Nothing acute, no free air, a few scattered sigmoid diverticula    Interpreted Contemporaneously by Dr. Herson Cunningham-radiologist           ED Course  ED Course   Comment By Time   CONSULT        Provider: Dr. Addison - Gen Surg    Discussion: requests CT and labs to eval for post-polypectomy syndrome    Agreeable c treatment and planned disposition.         Dmirtiy Cunningham MD 03/16 1916   After discussion with the surgeon.  Patient is agreeable to go forward with labs and CT.  He declines offer for pain medication at this time. Dmitriy Cunningham MD 03/16 1938   CONSULT        Provider: Dr. Addison - Gen Surg    Discussion: Reviewed labs and CT findings.  Would like to see the patient in morning and the office.  Prescription for Anusol would be nice.    Agreeable c treatment and planned disposition.         Dmitriy Cunningham MD 03/16 2109   Reviewed labs and CT with patient.  Patient agreeable with discharge plan. Dmitriy Cunningham MD 03/16 2111                   MDM    Final diagnoses:   Anal or rectal pain          Medication List      New Prescriptions          hydrocortisone 25 MG suppository   Commonly known as:  ANUSOL-HC   Insert 1 suppository into the rectum 2 (Two) Times a Day.                  Dmitriy Cunningham MD  03/16/17 6829

## 2017-03-16 NOTE — TELEPHONE ENCOUNTER
Patient had colonoscopy with removal of polyps on Tuesday and is having continuous left sided rectal pain.  It is worse with bowel movements and still has noticed some blood.  He wants to know if this is normal? Please advise.

## 2017-03-16 NOTE — TELEPHONE ENCOUNTER
CONTACTED PT, HE HAD A FEW QUESTIONS AND WANTED TO KNOW IF THERE WAS ANY WAY YOU COULD TALK TO HIM? [phone]

## 2017-03-17 ENCOUNTER — OFFICE VISIT (OUTPATIENT)
Dept: SURGERY | Facility: CLINIC | Age: 42
End: 2017-03-17

## 2017-03-17 VITALS
DIASTOLIC BLOOD PRESSURE: 80 MMHG | HEIGHT: 69 IN | SYSTOLIC BLOOD PRESSURE: 112 MMHG | BODY MASS INDEX: 32.58 KG/M2 | WEIGHT: 220 LBS

## 2017-03-17 DIAGNOSIS — Z09 FOLLOW UP: Primary | ICD-10-CM

## 2017-03-17 PROCEDURE — 99213 OFFICE O/P EST LOW 20 MIN: CPT | Performed by: SURGERY

## 2017-03-17 NOTE — PROGRESS NOTES
"    PATIENT INFORMATION  Elvis MADDEN - 1975    CHIEF COMPLAINT  Rectal pain/bleeding, c/s 3 days ago, seen in ER yesterday      HISTORY OF PRESENT ILLNESS  HPI  Patient is a 41-year-old male who contacted me on Thursday, 3/16/17.  He underwent colonoscopy on 3/14/17.  He called 3/16/17 and said that he was having \"rectal pain on the left side of his rectum\" along with bright red blood on the toilet paper when he would have a bowel movement.  He has only had 2 bowel movements since colonoscopy he has had constipation.  Patient was advised to go to the emergency room.  I called and discussed the case with Dr. Dmitriy Cunningham ER physician on-call at [facility].  Patient was seen in the ER on 3/16/17.  Labs completely normal.  Hemoglobin 14.8-hematocrit 45.  CT scan abdomen and pelvis with IV contrast reviewed no acute findings -diverticulosis, no diverticulitis.  Patient was given a prescription for Anusol suppository and asked to follow-up with me he presents to the office today for his follow-up appointment.  He reports he did not get his prescription filled as yet.  He denies fevers, chills, nausea, vomiting, abdominal pain, pelvic pain, urinary symptoms.  He reports he still having pain on the \"outside/left side of his rectum\"and this morning after he had a bowel movement he noticed there was \"an area on the outside that was bleeding.\"   Colonoscopy findings and pathology discussed with patient in detail.    REVIEW OF SYSTEMS  Review of Systems   Constitutional: Negative.    Respiratory: Negative.    Cardiovascular: Negative.    Gastrointestinal: Positive for anal bleeding.         ACTIVE PROBLEMS  Patient Active Problem List    Diagnosis   • Right upper quadrant abdominal pain [R10.11]   • Nausea & vomiting [R11.2]   • Bloating [R14.0]   • Hypertension [I10]   • Epididymitis [N45.1]         PAST MEDICAL HISTORY  Past Medical History   Diagnosis Date   • Guillermo's esophagus    • GERD " (gastroesophageal reflux disease)    • Hyperlipidemia    • Hypertension          SURGICAL HISTORY  Past Surgical History   Procedure Laterality Date   • [place] tooth extraction     • Endoscopy N/A 10/27/2016     Procedure: ESOPHAGOGASTRODUODENOSCOPY WITH BILE COLLECTION / RANDOM GASTRIC BIOPSIES,DUODENAL AND DISTAL ESOPHAGEAL BIOPSIES  AND ALFREDO TEST BIOPSY;  Surgeon: Tricia Addison MD;  Location: [facility];  Service:    • Colonoscopy N/A 3/14/2017     Procedure: COLONOSCOPY with polypectomy ;  Surgeon: Tricia Addison MD;  Location: [facility] OR;  Service:          FAMILY HISTORY  Family History   Problem Relation Age of Onset   • Heart attack Paternal Grandmother    • Hypertension Paternal Grandfather    • Heart attack Paternal Grandfather          SOCIAL HISTORY  Social History     Occupational History   • Not on file.     Social History Main Topics   • Smoking status: Light Tobacco Smoker     Years: 10.00     Types: Cigarettes, Cigars   • Smokeless tobacco: Not on file      Comment: monthly   • Alcohol use 1.8 - 2.4 oz/week     3 - 4 Cans of beer per week      Comment: 3-4 times a week   • Drug use: No   • Sexual activity: Defer         CURRENT MEDICATIONS    Current Outpatient Prescriptions:   •  atorvastatin (LIPITOR) 20 MG tablet, Take 20 mg by mouth Daily., Disp: , Rfl:   •  Dexlansoprazole (DEXILANT PO), Take 60 mg by mouth Daily., Disp: , Rfl:   •  hydrocortisone (ANUSOL-HC) 2.5 % rectal cream, Apply rectally 2 times daily, Disp: 30 g, Rfl: 0  •  hydrocortisone (ANUSOL-HC) 25 MG suppository, Insert 1 suppository into the rectum 2 (Two) Times a Day., Disp: 10 suppository, Rfl: 0  •  lisinopril-hydrochlorothiazide (PRINZIDE,ZESTORETIC) 20-12.5 MG per tablet, Take 1 tablet by mouth Daily., Disp: , Rfl:   •  pantoprazole (PROTONIX) 40 MG EC tablet, Take 1 tablet by mouth Daily., Disp: 30 tablet, Rfl: 0    ALLERGIES  Review of patient's allergies indicates no known allergies.    VITALS  Vitals:    03/17/17 0857  "  BP: 112/80   Weight: 220 lb (99.8 kg)   Height: 69\" (175.3 cm)       LAST RESULTS   Admission on 03/16/2017, Discharged on 03/16/2017   Component Date Value Ref Range Status   • Glucose 03/16/2017 104* 65 - 99 mg/dL Final   • BUN 03/16/2017 15  6 - 20 mg/dL Final   • Creatinine 03/16/2017 1.00  0.76 - 1.27 mg/dL Final   • Sodium 03/16/2017 132* 136 - 145 mmol/L Final   • Potassium 03/16/2017 4.0  3.5 - 5.2 mmol/L Final   • Chloride 03/16/2017 95* 98 - 107 mmol/L Final   • CO2 03/16/2017 27.4  22.0 - 29.0 mmol/L Final   • Calcium 03/16/2017 9.1  8.6 - 10.5 mg/dL Final   • eGFR Non African Amer 03/16/2017 83  >60 mL/min/1.73 Final   • BUN/Creatinine Ratio 03/16/2017 15.0  7.0 - 25.0 Final   • Anion Gap 03/16/2017 9.6  mmol/L Final   • WBC 03/16/2017 6.80  4.80 - 10.80 10*3/mm3 Final   • RBC 03/16/2017 4.94  4.70 - 6.10 10*6/mm3 Final   • Hemoglobin 03/16/2017 14.8  14.0 - 18.0 g/dL Final   • Hematocrit 03/16/2017 45.0  42.0 - 52.0 % Final   • MCV 03/16/2017 91.1  80.0 - 94.0 fL Final   • MCH 03/16/2017 30.0  27.0 - 31.0 pg Final   • MCHC 03/16/2017 32.9  31.0 - 37.0 g/dL Final   • RDW 03/16/2017 12.0  11.5 - 14.5 % Final   • RDW-SD 03/16/2017 40.2  37.0 - 54.0 fl Final   • MPV 03/16/2017 9.6  7.4 - 10.4 fL Final   • Platelets 03/16/2017 236  140 - 500 10*3/mm3 Final   • Neutrophil % 03/16/2017 59.0  45.0 - 70.0 % Final   • Lymphocyte % 03/16/2017 26.8  20.0 - 45.0 % Final   • Monocyte % 03/16/2017 10.3* 3.0 - 8.0 % Final   • Eosinophil % 03/16/2017 2.9  0.0 - 4.0 % Final   • Basophil % 03/16/2017 0.6  0.0 - 2.0 % Final   • Immature Grans % 03/16/2017 0.4  0.0 - 0.5 % Final   • Neutrophils, Absolute 03/16/2017 4.01  1.50 - 8.30 10*3/mm3 Final   • Lymphocytes, Absolute 03/16/2017 1.82  0.60 - 4.80 10*3/mm3 Final   • Monocytes, Absolute 03/16/2017 0.70  0.00 - 1.00 10*3/mm3 Final   • Eosinophils, Absolute 03/16/2017 0.20  0.10 - 0.30 10*3/mm3 Final   • Basophils, Absolute 03/16/2017 0.04  0.00 - 0.20 10*3/mm3 Final   • " Immature Grans, Absolute 03/16/2017 0.03  0.00 - 0.03 10*3/mm3 Final   • nRBC 03/16/2017 0.0  0.0 - 0.0 /100 WBC Final     Ct Abdomen Pelvis With Contrast    Result Date: 3/17/2017  Narrative: INDICATION: Rectal bleeding status post polypectomy on 03/14/2017..  TECHNIQUE: CT of the abdomen and pelvis with p.o. and IV contrast. Coronal and sagittal reconstructions were obtained.  Radiation dose reduction techniques were utilized, including automated exposure control and exposure modulation based on body size.  COMPARISON: None available..  FINDINGS: Abdomen: The solid abdominal organs are within normal limits. There are some benign cysts in the kidneys. The gallbladder is not distended.  The bowel is not dilated.  The appendix is normal. Scattered colonic diverticula.  Pelvis: No pelvic mass or free pelvic fluid.  No enlarged pelvic or inguinal lymph nodes..  No acute osseous abnormalities.      Impression: Impression: No acute findings. Diverticulosis.   Initial interpretation provided by Dr. Lemuel Cunningham at 21:04 on 03/17/2017.  This report was finalized on 3/17/2017 7:34 AM by Dr. Dmitriy Traore MD.        PHYSICAL EXAM  Physical Exam   Constitutional: He appears well-developed and well-nourished.   HENT:   Head: Normocephalic and atraumatic.   Eyes: No scleral icterus.   Neck: Normal range of motion. Neck supple.   Cardiovascular: Normal rate, regular rhythm and normal heart sounds.    Pulmonary/Chest: Breath sounds normal.   Abdominal:   Soft, nondistended, nontender positive bowel sounds in all 4 quadrants   Genitourinary:   Genitourinary Comments: Digital rectal exam and anoscopy done in the presence of chaperone    Digital rectal exam-  no gross blood, no fluctuance, no external skin changes.  Mild discomfort along the left side of the sphincter.   Anoscopy-   negative exam   Musculoskeletal: He exhibits no edema.   Nursing note and vitals reviewed.      ASSESSMENT  Proctalgia  Question rectal bleed  Question  post-polypectomy but CT scan was completely negative, there is no fever or leukocytosis.    Recent colonoscopy with polypectomy -- pathology results discussed with patient.  Based on pathology results would recommend repeat colonoscopy in 3 years or sooner if he has worsening symptoms or problems    Constipation-high-fiber instruction sheet provided.  List of over-the-counter fiber supplements i.e. Metamucil/MiraLAX, provided to the patient.  Patient was advised to fill his prescription for the Anusol suppository.  Anusol HC 2.5% rectal cream was e- scribed and samples also given to the patient.  Sitz baths discussed and patient instructed.  Patient verbalized understanding.      PLAN  Follow-up 2 weeks.  Patient was advised to call the office sooner should he have worsening symptoms or go to the nearest emergency room.

## 2017-03-31 ENCOUNTER — OFFICE VISIT (OUTPATIENT)
Dept: SURGERY | Facility: CLINIC | Age: 42
End: 2017-03-31

## 2017-03-31 VITALS
HEIGHT: 69 IN | SYSTOLIC BLOOD PRESSURE: 128 MMHG | WEIGHT: 215 LBS | DIASTOLIC BLOOD PRESSURE: 82 MMHG | BODY MASS INDEX: 31.84 KG/M2

## 2017-03-31 DIAGNOSIS — R14.0 ABDOMINAL BLOATING: Primary | ICD-10-CM

## 2017-03-31 DIAGNOSIS — R10.9 ABDOMINAL CRAMPING: ICD-10-CM

## 2017-03-31 PROCEDURE — 99213 OFFICE O/P EST LOW 20 MIN: CPT | Performed by: SURGERY

## 2017-03-31 RX ORDER — PANTOPRAZOLE SODIUM 40 MG/1
40 TABLET, DELAYED RELEASE ORAL DAILY
Qty: 30 TABLET | Refills: 0 | Status: SHIPPED | OUTPATIENT
Start: 2017-03-31 | End: 2017-07-10 | Stop reason: SDUPTHER

## 2017-03-31 RX ORDER — MELOXICAM 7.5 MG/1
7.5 TABLET ORAL DAILY
Status: ON HOLD | COMMUNITY
End: 2017-09-26

## 2017-03-31 NOTE — PROGRESS NOTES
"    PATIENT INFORMATION  Elvis Vallejo   - 1975    CHIEF COMPLAINT    2 wk f/u, rectal bleeding and rectal pain resolved.  Still with abdominal cramping and bloating    HISTORY OF PRESENT ILLNESS  HPI  Patient presents to the office today for follow-up.  He reports to the proctalgia and rectal bleeding that he was complaining of at the last visit after his colonoscopy has completely spontaneously resolved.  He has not experienced any further episodes of rectal pain, anal pain or bleeding.  His main issue now is his ongoing generalized abdominal bloating and cramping.  He reports this is \"constantly present .\"  There is no relation to food.  Denies any nausea vomiting, abdominal pain.  Reports regular bowel movements.  Denies any melena hematochezia or urinary symptoms.  Has been on fiber supplements.  I had prescribed Bentyl to him which patient discontinued as he felt it was not helping.  He has been on Protonix for his GERD and Guillermo's esophagitis but reports that the symptoms are uncontrolled. He was on Dexilant in the past with good results.  Unfortunately due to insurance issues he can no longer get the prescription filled.  We discussed adding a H2 blocker if needed along with the PPI.  I also discussed GERD and dietary and lifestyle modification with him in detail.    REVIEW OF SYSTEMS  Review of Systems   Constitutional: Negative.    Respiratory: Negative.    Cardiovascular: Negative.    Gastrointestinal: Positive for abdominal distention.        Abdominal bloating and cramping         ACTIVE PROBLEMS  Patient Active Problem List    Diagnosis   • Right upper quadrant abdominal pain [R10.11]   • Nausea & vomiting [R11.2]   • Bloating [R14.0]   • Hypertension [I10]   • Epididymitis [N45.1]         PAST MEDICAL HISTORY  Past Medical History:   Diagnosis Date   • Guillermo's esophagus    • GERD (gastroesophageal reflux disease)    • Hyperlipidemia    • Hypertension          SURGICAL HISTORY  Past " "Surgical History:   Procedure Laterality Date   • COLONOSCOPY N/A 3/14/2017    Procedure: COLONOSCOPY with polypectomy ;  Surgeon: Tricia Addison MD;  Location: [facility] OR;  Service:    • ENDOSCOPY N/A 10/27/2016    Procedure: ESOPHAGOGASTRODUODENOSCOPY WITH BILE COLLECTION / RANDOM GASTRIC BIOPSIES,DUODENAL AND DISTAL ESOPHAGEAL BIOPSIES  AND ALFREDO TEST BIOPSY;  Surgeon: Tricia Addison MD;  Location: [facility] OR;  Service:    • WISDOM TOOTH EXTRACTION           FAMILY HISTORY  Family History   Problem Relation Age of Onset   • Heart attack Paternal Grandmother    • Hypertension Paternal Grandfather    • Heart attack Paternal Grandfather          SOCIAL HISTORY  Social History     Occupational History   • Not on file.     Social History Main Topics   • Smoking status: Light Tobacco Smoker     Years: 10.00     Types: Cigarettes, Cigars   • Smokeless tobacco: Not on file      Comment: monthly   • Alcohol use 1.8 - 2.4 oz/week     3 - 4 Cans of beer per week      Comment: 3-4 times a week   • Drug use: No   • Sexual activity: Defer         CURRENT MEDICATIONS    Current Outpatient Prescriptions:   •  meloxicam (MOBIC) 7.5 MG tablet, Take 7.5 mg by mouth Daily., Disp: , Rfl:   •  atorvastatin (LIPITOR) 20 MG tablet, Take 20 mg by mouth Daily., Disp: , Rfl:   •  lisinopril-hydrochlorothiazide (PRINZIDE,ZESTORETIC) 20-12.5 MG per tablet, Take 1 tablet by mouth Daily., Disp: , Rfl:   •  pantoprazole (PROTONIX) 40 MG EC tablet, Take 1 tablet by mouth Daily., Disp: 30 tablet, Rfl: 0    ALLERGIES  Review of patient's allergies indicates no known allergies.    VITALS  Vitals:    03/31/17 1007   BP: 128/82   Weight: 215 lb (97.5 kg)   Height: 69\" (175.3 cm)       LAST RESULTS   Admission on 03/16/2017, Discharged on 03/16/2017   Component Date Value Ref Range Status   • Glucose 03/16/2017 104* 65 - 99 mg/dL Final   • BUN 03/16/2017 15  6 - 20 mg/dL Final   • Creatinine 03/16/2017 1.00  0.76 - 1.27 mg/dL Final   • Sodium " 03/16/2017 132* 136 - 145 mmol/L Final   • Potassium 03/16/2017 4.0  3.5 - 5.2 mmol/L Final   • Chloride 03/16/2017 95* 98 - 107 mmol/L Final   • CO2 03/16/2017 27.4  22.0 - 29.0 mmol/L Final   • Calcium 03/16/2017 9.1  8.6 - 10.5 mg/dL Final   • eGFR Non African Amer 03/16/2017 83  >60 mL/min/1.73 Final   • BUN/Creatinine Ratio 03/16/2017 15.0  7.0 - 25.0 Final   • Anion Gap 03/16/2017 9.6  mmol/L Final   • WBC 03/16/2017 6.80  4.80 - 10.80 10*3/mm3 Final   • RBC 03/16/2017 4.94  4.70 - 6.10 10*6/mm3 Final   • Hemoglobin 03/16/2017 14.8  14.0 - 18.0 g/dL Final   • Hematocrit 03/16/2017 45.0  42.0 - 52.0 % Final   • MCV 03/16/2017 91.1  80.0 - 94.0 fL Final   • MCH 03/16/2017 30.0  27.0 - 31.0 pg Final   • MCHC 03/16/2017 32.9  31.0 - 37.0 g/dL Final   • RDW 03/16/2017 12.0  11.5 - 14.5 % Final   • RDW-SD 03/16/2017 40.2  37.0 - 54.0 fl Final   • MPV 03/16/2017 9.6  7.4 - 10.4 fL Final   • Platelets 03/16/2017 236  140 - 500 10*3/mm3 Final   • Neutrophil % 03/16/2017 59.0  45.0 - 70.0 % Final   • Lymphocyte % 03/16/2017 26.8  20.0 - 45.0 % Final   • Monocyte % 03/16/2017 10.3* 3.0 - 8.0 % Final   • Eosinophil % 03/16/2017 2.9  0.0 - 4.0 % Final   • Basophil % 03/16/2017 0.6  0.0 - 2.0 % Final   • Immature Grans % 03/16/2017 0.4  0.0 - 0.5 % Final   • Neutrophils, Absolute 03/16/2017 4.01  1.50 - 8.30 10*3/mm3 Final   • Lymphocytes, Absolute 03/16/2017 1.82  0.60 - 4.80 10*3/mm3 Final   • Monocytes, Absolute 03/16/2017 0.70  0.00 - 1.00 10*3/mm3 Final   • Eosinophils, Absolute 03/16/2017 0.20  0.10 - 0.30 10*3/mm3 Final   • Basophils, Absolute 03/16/2017 0.04  0.00 - 0.20 10*3/mm3 Final   • Immature Grans, Absolute 03/16/2017 0.03  0.00 - 0.03 10*3/mm3 Final   • nRBC 03/16/2017 0.0  0.0 - 0.0 /100 WBC Final     Ct Abdomen Pelvis With Contrast    Result Date: 3/17/2017  Narrative: INDICATION: Rectal bleeding status post polypectomy on 03/14/2017..  TECHNIQUE: CT of the abdomen and pelvis with p.o. and IV contrast.  Coronal and sagittal reconstructions were obtained.  Radiation dose reduction techniques were utilized, including automated exposure control and exposure modulation based on body size.  COMPARISON: None available..  FINDINGS: Abdomen: The solid abdominal organs are within normal limits. There are some benign cysts in the kidneys. The gallbladder is not distended.  The bowel is not dilated.  The appendix is normal. Scattered colonic diverticula.  Pelvis: No pelvic mass or free pelvic fluid.  No enlarged pelvic or inguinal lymph nodes..  No acute osseous abnormalities.      Impression: Impression: No acute findings. Diverticulosis.   Initial interpretation provided by Dr. Lemuel Cunningham at 21:04 on 03/17/2017.  This report was finalized on 3/17/2017 7:34 AM by Dr. Dmitriy Traore MD.        PHYSICAL EXAM  Physical Exam   Constitutional: He is oriented to person, place, and time. He appears well-developed and well-nourished.   Neck: Neck supple.   Cardiovascular: Normal rate, regular rhythm and normal heart sounds.    Pulmonary/Chest: Breath sounds normal.   Abdominal:   Soft, nondistended, nontender positive bowel sounds in all 4 quadrants.   Musculoskeletal: He exhibits no edema.   Neurological: He is alert and oriented to person, place, and time.   Nursing note and vitals reviewed.      ASSESSMENT    Proctalgia  Rectal bleed - after colonoscopy-completely spontaneously resolved.  CT scan done postcholecystectomy was completely negative.  Question post-polypectomy but CT scan was completely negative, there is no fever or leukocytosis.     Recent colonoscopy with polypectomy -- pathology results discussed with patient. Based on pathology results would recommend repeat colonoscopy in 3 years or sooner if he has worsening symptoms or problems     Constipation-high-fiber instruction sheet provided. List of over-the-counter fiber supplements i.e. Metamucil/MiraLAX, provided to the patient.    Abdominal cramping and  bloating-thus far has had a negative workup.  We discussed further imaging such as gastric emptying scan and or MRI of the abdomen worse his GI consultation to rule out irritable bowel syndrome.  I think at this point Mr. Bermudez should be deferred to GI as I suspect he may have irritable bowel syndrome.  Patient is agreeable.    PLAN  Referral to Dr. Amy Hoffman gastroenterology.  Refill on Protonix e scribed.  Follow-up after GI consultation.  Patient was advised to call the office sooner should he have worsening symptoms or go to the nearest emergency room.

## 2017-04-27 ENCOUNTER — OFFICE VISIT (OUTPATIENT)
Dept: GASTROENTEROLOGY | Facility: CLINIC | Age: 42
End: 2017-04-27

## 2017-04-27 VITALS
HEIGHT: 69 IN | HEART RATE: 71 BPM | WEIGHT: 212.4 LBS | OXYGEN SATURATION: 97 % | BODY MASS INDEX: 31.46 KG/M2 | TEMPERATURE: 97.5 F

## 2017-04-27 DIAGNOSIS — R10.9 ABDOMINAL PAIN, UNSPECIFIED LOCATION: Primary | ICD-10-CM

## 2017-04-27 DIAGNOSIS — A04.8 BACTERIAL INFECTION DUE TO HELICOBACTER PYLORI: ICD-10-CM

## 2017-04-27 PROCEDURE — 99203 OFFICE O/P NEW LOW 30 MIN: CPT | Performed by: INTERNAL MEDICINE

## 2017-04-27 RX ORDER — HYOSCYAMINE SULFATE 0.125 MG
0.12 TABLET ORAL
Qty: 90 TABLET | Refills: 5 | Status: SHIPPED | OUTPATIENT
Start: 2017-04-27 | End: 2017-06-28

## 2017-04-27 NOTE — PROGRESS NOTES
PATIENT INFORMATION  Elvis Vallejo       - 1975    CHIEF COMPLAINT  Chief Complaint   Patient presents with   • Abdominal Pain   • Nausea       HISTORY OF PRESENT ILLNESS  Abdominal Pain   Associated symptoms include nausea.   Nausea   Associated symptoms include abdominal pain and nausea.     40 yo with intermittent abdominal pain, cramping and bloating for at least 6 months. Prior to this point he denies any complaints.  He is not aware of any recent infections prior to his onset of symptoms.  He was also having a lot of acid reflux.   He saw Dr. Addison and had EGD and CLS done. EGD with gastritis, h.pylori and Guillermo`s esophagus.  Small bowel biopsies look normal.  He had a CLS done which showed diverticulosis and serrated adenomas.  In regards to his reflux he has been placed on protonix and is clinically better in regards to this.  He was tried on Bentyl but stated that this did not help him with his cramping.  He was only taking 1 tablet daily.  He does work at the [employer] but does not think stress affects his pain.  He does not think eating or bowel movements affect his pain.  His abdominal pain and cramping is diffuse mostly on the left side.  They are sporadic but do occur daily.  They can last minutes to days.  There is no associated change in bowel habits.  His overall weight appears as though he has lost about 8 pounds since March.      REVIEW OF SYSTEMS  Review of Systems   Gastrointestinal: Positive for abdominal pain and nausea.   All other systems reviewed and are negative.        ACTIVE PROBLEMS  Patient Active Problem List    Diagnosis   • Right upper quadrant abdominal pain [R10.11]   • Nausea & vomiting [R11.2]   • Bloating [R14.0]   • Hypertension [I10]   • Epididymitis [N45.1]         PAST MEDICAL HISTORY  Past Medical History:   Diagnosis Date   • Guillermo's esophagus    • GERD (gastroesophageal reflux disease)    • Hyperlipidemia    • Hypertension          SURGICAL  "HISTORY  Past Surgical History:   Procedure Laterality Date   • COLONOSCOPY N/A 3/14/2017    Procedure: COLONOSCOPY with polypectomy ;  Surgeon: Tricia Addison MD;  Location: [facility] OR;  Service:    • ENDOSCOPY N/A 10/27/2016    Procedure: ESOPHAGOGASTRODUODENOSCOPY WITH BILE COLLECTION / RANDOM GASTRIC BIOPSIES,DUODENAL AND DISTAL ESOPHAGEAL BIOPSIES  AND ALFREDO TEST BIOPSY;  Surgeon: Tricia Addison MD;  Location: [facility] OR;  Service:    • WISDOM TOOTH EXTRACTION           FAMILY HISTORY  Family History   Problem Relation Age of Onset   • Heart attack Paternal Grandmother    • Hypertension Paternal Grandfather    • Heart attack Paternal Grandfather          SOCIAL HISTORY  Social History     Occupational History   • Not on file.     Social History Main Topics   • Smoking status: Light Tobacco Smoker     Years: 10.00     Types: Cigarettes, Cigars   • Smokeless tobacco: Not on file      Comment: monthly   • Alcohol use 1.8 - 2.4 oz/week     3 - 4 Cans of beer per week      Comment: 3-4 times a week   • Drug use: No   • Sexual activity: Defer         CURRENT MEDICATIONS    Current Outpatient Prescriptions:   •  atorvastatin (LIPITOR) 20 MG tablet, Take 20 mg by mouth Daily., Disp: , Rfl:   •  lisinopril-hydrochlorothiazide (PRINZIDE,ZESTORETIC) 20-12.5 MG per tablet, Take 1 tablet by mouth Daily., Disp: , Rfl:   •  meloxicam (MOBIC) 7.5 MG tablet, Take 7.5 mg by mouth Daily., Disp: , Rfl:   •  pantoprazole (PROTONIX) 40 MG EC tablet, Take 1 tablet by mouth Daily., Disp: 30 tablet, Rfl: 0    ALLERGIES  Review of patient's allergies indicates no known allergies.    VITALS  Vitals:    04/27/17 1127   Pulse: 71   Temp: 97.5 °F (36.4 °C)   TempSrc: Oral   SpO2: 97%   Weight: 212 lb 6.4 oz (96.3 kg)   Height: 69\" (175.3 cm)       LAST RESULTS   Admission on 03/16/2017, Discharged on 03/16/2017   Component Date Value Ref Range Status   • Glucose 03/16/2017 104* 65 - 99 mg/dL Final   • BUN 03/16/2017 15  6 - 20 mg/dL Final "   • Creatinine 03/16/2017 1.00  0.76 - 1.27 mg/dL Final   • Sodium 03/16/2017 132* 136 - 145 mmol/L Final   • Potassium 03/16/2017 4.0  3.5 - 5.2 mmol/L Final   • Chloride 03/16/2017 95* 98 - 107 mmol/L Final   • CO2 03/16/2017 27.4  22.0 - 29.0 mmol/L Final   • Calcium 03/16/2017 9.1  8.6 - 10.5 mg/dL Final   • eGFR Non African Amer 03/16/2017 83  >60 mL/min/1.73 Final   • BUN/Creatinine Ratio 03/16/2017 15.0  7.0 - 25.0 Final   • Anion Gap 03/16/2017 9.6  mmol/L Final   • WBC 03/16/2017 6.80  4.80 - 10.80 10*3/mm3 Final   • RBC 03/16/2017 4.94  4.70 - 6.10 10*6/mm3 Final   • Hemoglobin 03/16/2017 14.8  14.0 - 18.0 g/dL Final   • Hematocrit 03/16/2017 45.0  42.0 - 52.0 % Final   • MCV 03/16/2017 91.1  80.0 - 94.0 fL Final   • MCH 03/16/2017 30.0  27.0 - 31.0 pg Final   • MCHC 03/16/2017 32.9  31.0 - 37.0 g/dL Final   • RDW 03/16/2017 12.0  11.5 - 14.5 % Final   • RDW-SD 03/16/2017 40.2  37.0 - 54.0 fl Final   • MPV 03/16/2017 9.6  7.4 - 10.4 fL Final   • Platelets 03/16/2017 236  140 - 500 10*3/mm3 Final   • Neutrophil % 03/16/2017 59.0  45.0 - 70.0 % Final   • Lymphocyte % 03/16/2017 26.8  20.0 - 45.0 % Final   • Monocyte % 03/16/2017 10.3* 3.0 - 8.0 % Final   • Eosinophil % 03/16/2017 2.9  0.0 - 4.0 % Final   • Basophil % 03/16/2017 0.6  0.0 - 2.0 % Final   • Immature Grans % 03/16/2017 0.4  0.0 - 0.5 % Final   • Neutrophils, Absolute 03/16/2017 4.01  1.50 - 8.30 10*3/mm3 Final   • Lymphocytes, Absolute 03/16/2017 1.82  0.60 - 4.80 10*3/mm3 Final   • Monocytes, Absolute 03/16/2017 0.70  0.00 - 1.00 10*3/mm3 Final   • Eosinophils, Absolute 03/16/2017 0.20  0.10 - 0.30 10*3/mm3 Final   • Basophils, Absolute 03/16/2017 0.04  0.00 - 0.20 10*3/mm3 Final   • Immature Grans, Absolute 03/16/2017 0.03  0.00 - 0.03 10*3/mm3 Final   • nRBC 03/16/2017 0.0  0.0 - 0.0 /100 WBC Final     No results found.    PHYSICAL EXAM  Physical Exam   Constitutional: He is oriented to person, place, and time. He appears well-developed and  well-nourished. No distress.   HENT:   Head: Normocephalic and atraumatic.   Eyes: EOM are normal. Pupils are equal, round, and reactive to light.   Neck: Neck supple. No tracheal deviation present.   Cardiovascular: Normal rate, regular rhythm, normal heart sounds and intact distal pulses.  Exam reveals no gallop and no friction rub.    No murmur heard.  Pulmonary/Chest: Effort normal and breath sounds normal. No respiratory distress. He has no wheezes. He has no rales. He exhibits no tenderness.   Abdominal: Soft. Bowel sounds are normal. He exhibits no distension. There is no tenderness. There is no rebound and no guarding.   Musculoskeletal: He exhibits no edema.   Lymphadenopathy:     He has no cervical adenopathy.   Neurological: He is alert and oriented to person, place, and time.   Skin: Skin is warm. He is not diaphoretic. No erythema.   Psychiatric: He has a normal mood and affect. His behavior is normal. Judgment and thought content normal.   Nursing note and vitals reviewed.      ASSESSMENT  Diagnoses and all orders for this visit:    Nausea    Abdominal pain, unspecified location  -     Celiac Ab tTG DGP TIgA    Bacterial infection due to Helicobacter pylori  -     H. Pylori Breath Test          PLAN  No Follow-up on file.      Check for h.pylori eradication, please try to come off his Protonix prior to his breath test.  Trial of levsin or librax  Will need sbbo.  Daily probiotic    Trial of gluten free diet.

## 2017-05-22 ENCOUNTER — TELEPHONE (OUTPATIENT)
Dept: GASTROENTEROLOGY | Facility: CLINIC | Age: 42
End: 2017-05-22

## 2017-05-23 ENCOUNTER — APPOINTMENT (OUTPATIENT)
Dept: LAB | Facility: HOSPITAL | Age: 42
End: 2017-05-23
Attending: INTERNAL MEDICINE

## 2017-05-23 PROCEDURE — 36415 COLL VENOUS BLD VENIPUNCTURE: CPT | Performed by: INTERNAL MEDICINE

## 2017-05-23 PROCEDURE — 83516 IMMUNOASSAY NONANTIBODY: CPT | Performed by: INTERNAL MEDICINE

## 2017-05-24 PROBLEM — R88.8: Status: ACTIVE | Noted: 2017-05-24

## 2017-05-25 PROBLEM — K63.8219 SMALL INTESTINAL BACTERIAL OVERGROWTH: Status: ACTIVE | Noted: 2017-05-25

## 2017-05-25 PROBLEM — K63.89 SMALL INTESTINAL BACTERIAL OVERGROWTH: Status: ACTIVE | Noted: 2017-05-25

## 2017-05-25 LAB
GLIADIN PEPTIDE IGA SER-ACNC: 7 UNITS (ref 0–19)
GLIADIN PEPTIDE IGG SER-ACNC: 2 UNITS (ref 0–19)
IGA SERPL-MCNC: 260 MG/DL (ref 90–386)
TTG IGA SER-ACNC: <2 U/ML (ref 0–3)
TTG IGG SER-ACNC: <2 U/ML (ref 0–5)

## 2017-06-08 RX ORDER — RIFAXIMIN 550 MG/1
TABLET ORAL
Qty: 42 TABLET | Refills: 0 | OUTPATIENT
Start: 2017-06-08

## 2017-06-28 ENCOUNTER — OFFICE VISIT (OUTPATIENT)
Dept: GASTROENTEROLOGY | Facility: CLINIC | Age: 42
End: 2017-06-28

## 2017-06-28 VITALS
DIASTOLIC BLOOD PRESSURE: 80 MMHG | WEIGHT: 217 LBS | HEIGHT: 69 IN | BODY MASS INDEX: 32.14 KG/M2 | SYSTOLIC BLOOD PRESSURE: 126 MMHG

## 2017-06-28 DIAGNOSIS — R10.9 ABDOMINAL PAIN, UNSPECIFIED LOCATION: Primary | ICD-10-CM

## 2017-06-28 DIAGNOSIS — K22.70 BARRETT'S ESOPHAGUS WITHOUT DYSPLASIA: ICD-10-CM

## 2017-06-28 DIAGNOSIS — R11.0 NAUSEA: ICD-10-CM

## 2017-06-28 DIAGNOSIS — R88.8: ICD-10-CM

## 2017-06-28 DIAGNOSIS — K21.9 GASTROESOPHAGEAL REFLUX DISEASE, ESOPHAGITIS PRESENCE NOT SPECIFIED: ICD-10-CM

## 2017-06-28 PROCEDURE — 99214 OFFICE O/P EST MOD 30 MIN: CPT | Performed by: INTERNAL MEDICINE

## 2017-06-28 RX ORDER — CHLORDIAZEPOXIDE HYDROCHLORIDE AND CLIDINIUM BROMIDE 5; 2.5 MG/1; MG/1
1 CAPSULE ORAL
Qty: 90 CAPSULE | Refills: 3 | Status: SHIPPED | OUTPATIENT
Start: 2017-06-28 | End: 2019-05-09

## 2017-06-28 NOTE — PROGRESS NOTES
PATIENT INFORMATION  Elvis Vallejo       - 1975    CHIEF COMPLAINT  Chief Complaint   Patient presents with   • Abdominal Pain     FOLLOW UP SBBO   • Nausea       HISTORY OF PRESENT ILLNESS  Abdominal Pain   Associated symptoms include nausea.   Nausea   Associated symptoms include abdominal pain and nausea.     He is here today in follow up and is doing little better. He is still having cramping and bloating. He finished his xifaxan and is still on protonix daily. He stopped the levsin as he did not think it helped much. He is having one to two bm daily. Weight has been stable.  He is maintained on a [facility] probiotic.  He drinks 2 glasses of tea daily, 3-4 cups of decaf coffee daily and occ. Beer.   Weight has been stable.    REVIEW OF SYSTEMS  Review of Systems   Gastrointestinal: Positive for abdominal pain and nausea.   All other systems reviewed and are negative.        ACTIVE PROBLEMS  Patient Active Problem List    Diagnosis   • Small intestinal bacterial overgrowth [K63.89]   • Positive lactulose breath hydrogen test [R88.8]   • Right upper quadrant abdominal pain [R10.11]   • Nausea & vomiting [R11.2]   • Bloating [R14.0]   • Hypertension [I10]   • Epididymitis [N45.1]         PAST MEDICAL HISTORY  Past Medical History:   Diagnosis Date   • Guillermo's esophagus    • GERD (gastroesophageal reflux disease)    • Hyperlipidemia    • Hypertension          SURGICAL HISTORY  Past Surgical History:   Procedure Laterality Date   • COLONOSCOPY N/A 3/14/2017    Procedure: COLONOSCOPY with polypectomy ;  Surgeon: Tricia Addison MD;  Location: [facility] OR;  Service:    • ENDOSCOPY N/A 10/27/2016    Procedure: ESOPHAGOGASTRODUODENOSCOPY WITH BILE COLLECTION / RANDOM GASTRIC BIOPSIES,DUODENAL AND DISTAL ESOPHAGEAL BIOPSIES  AND ALFREDO TEST BIOPSY;  Surgeon: Tricia Addison MD;  Location: [facility] OR;  Service:    • WISDOM TOOTH EXTRACTION           FAMILY HISTORY  Family History   Problem Relation Age of Onset   •  "Heart attack Paternal Grandmother    • Hypertension Paternal Grandfather    • Heart attack Paternal Grandfather          SOCIAL HISTORY  Social History     Occupational History   • Not on file.     Social History Main Topics   • Smoking status: Light Tobacco Smoker     Years: 10.00     Types: Cigarettes, Cigars   • Smokeless tobacco: Not on file      Comment: monthly   • Alcohol use 1.8 - 2.4 oz/week     3 - 4 Cans of beer per week      Comment: 3-4 times a week   • Drug use: No   • Sexual activity: Defer         CURRENT MEDICATIONS    Current Outpatient Prescriptions:   •  atorvastatin (LIPITOR) 20 MG tablet, Take 20 mg by mouth Daily., Disp: , Rfl:   •  clidinium-chlordiazePOXIDE (LIBRAX) 5-2.5 MG per capsule, Take 1 capsule by mouth 3 (Three) Times a Day With Meals., Disp: 90 capsule, Rfl: 3  •  lisinopril-hydrochlorothiazide (PRINZIDE,ZESTORETIC) 20-12.5 MG per tablet, Take 1 tablet by mouth Daily., Disp: , Rfl:   •  meloxicam (MOBIC) 7.5 MG tablet, Take 7.5 mg by mouth Daily., Disp: , Rfl:   •  pantoprazole (PROTONIX) 40 MG EC tablet, Take 1 tablet by mouth Daily., Disp: 30 tablet, Rfl: 0  •  rifaximin (XIFAXAN) 550 MG tablet, Take 1 tablet by mouth 3 (Three) Times a Day., Disp: 42 tablet, Rfl: 0    ALLERGIES  Review of patient's allergies indicates no known allergies.    VITALS  Vitals:    06/28/17 1455   BP: 126/80   Weight: 217 lb (98.4 kg)   Height: 69\" (175.3 cm)       LAST RESULTS   Office Visit on 04/27/2017   Component Date Value Ref Range Status   • IgA 05/23/2017 260  90 - 386 mg/dL Final   • Gliadin Deamidated Peptide Ab, IgA 05/23/2017 7  0 - 19 units Final                       Negative                   0 - 19                     Weak Positive             20 - 30                     Moderate to Strong Positive   >30   • Deaminated Gliadin Ab IgG 05/23/2017 2  0 - 19 units Final                       Negative                   0 - 19                     Weak Positive             20 - 30             "         Moderate to Strong Positive   >30   • Tissue Transglutaminase IgA 05/23/2017 <2  0 - 3 U/mL Final                                  Negative        0 -  3                                Weak Positive   4 - 10                                Positive           >10   Tissue Transglutaminase (tTG) has been identified   as the endomysial antigen.  Studies have demonstr-   ated that endomysial IgA antibodies have over 99%   specificity for gluten sensitive enteropathy.   • Tissue Transglutaminase IgG 05/23/2017 <2  0 - 5 U/mL Final                                  Negative        0 - 5                                Weak Positive   6 - 9                                Positive           >9     No results found.    PHYSICAL EXAM  Physical Exam   Constitutional: He is oriented to person, place, and time. He appears well-developed and well-nourished. No distress.   HENT:   Head: Normocephalic and atraumatic.   Eyes: EOM are normal. Pupils are equal, round, and reactive to light.   Neck: Neck supple. No tracheal deviation present.   Cardiovascular: Normal rate, regular rhythm, normal heart sounds and intact distal pulses.  Exam reveals no gallop and no friction rub.    No murmur heard.  Pulmonary/Chest: Effort normal and breath sounds normal. No respiratory distress. He has no wheezes. He has no rales. He exhibits no tenderness.   Abdominal: Soft. Bowel sounds are normal. He exhibits no distension. There is no tenderness. There is no rebound and no guarding.   Musculoskeletal: He exhibits no edema.   Lymphadenopathy:     He has no cervical adenopathy.   Neurological: He is alert and oriented to person, place, and time.   Skin: Skin is warm. He is not diaphoretic. No erythema.   Psychiatric: He has a normal mood and affect. His behavior is normal. Judgment and thought content normal.   Nursing note and vitals reviewed.      ASSESSMENT  Diagnoses and all orders for this visit:    Abdominal pain, unspecified  location    Nausea    Positive lactulose breath hydrogen test    Gastroesophageal reflux disease, esophagitis presence not specified    Guillermo's esophagus without dysplasia    Other orders  -     clidinium-chlordiazePOXIDE (LIBRAX) 5-2.5 MG per capsule; Take 1 capsule by mouth 3 (Three) Times a Day With Meals.          PLAN  No Follow-up on file.      Trial of librax and ibguard. RTC in 3 months

## 2017-06-30 PROBLEM — K21.00 GASTROESOPHAGEAL REFLUX DISEASE WITH ESOPHAGITIS: Status: ACTIVE | Noted: 2017-06-30

## 2017-07-10 RX ORDER — PANTOPRAZOLE SODIUM 40 MG/1
40 TABLET, DELAYED RELEASE ORAL DAILY
Qty: 90 TABLET | Refills: 3 | Status: SHIPPED | OUTPATIENT
Start: 2017-07-10 | End: 2017-09-27 | Stop reason: ALTCHOICE

## 2017-09-15 ENCOUNTER — OFFICE VISIT (OUTPATIENT)
Dept: SURGERY | Facility: CLINIC | Age: 42
End: 2017-09-15

## 2017-09-15 VITALS
SYSTOLIC BLOOD PRESSURE: 120 MMHG | WEIGHT: 222 LBS | HEIGHT: 69 IN | BODY MASS INDEX: 32.88 KG/M2 | DIASTOLIC BLOOD PRESSURE: 78 MMHG

## 2017-09-15 DIAGNOSIS — R10.11 RIGHT UPPER QUADRANT ABDOMINAL PAIN: ICD-10-CM

## 2017-09-15 DIAGNOSIS — K21.9 GASTROESOPHAGEAL REFLUX DISEASE, ESOPHAGITIS PRESENCE NOT SPECIFIED: ICD-10-CM

## 2017-09-15 DIAGNOSIS — K22.70 BARRETT'S ESOPHAGUS WITHOUT DYSPLASIA: Primary | ICD-10-CM

## 2017-09-15 DIAGNOSIS — R14.0 BLOATING: ICD-10-CM

## 2017-09-15 PROCEDURE — 99213 OFFICE O/P EST LOW 20 MIN: CPT | Performed by: SURGERY

## 2017-09-15 RX ORDER — CELECOXIB 200 MG/1
CAPSULE ORAL
COMMUNITY
Start: 2017-08-14 | End: 2017-09-26 | Stop reason: HOSPADM

## 2017-09-15 NOTE — PROGRESS NOTES
PATIENT INFORMATION  Elvis Vallejo   - 1975    CHIEF COMPLAINT  Chief Complaint   Patient presents with   • Follow-up   • Pre-op Exam     1 yr EGD due,   Guillermo's esophagus  GERD      HISTORY OF PRESENT ILLNESS  HPI  Patient presents to the office today for follow-up and to discuss his annual upper endoscopy.  Since his last visit with me he was seen by Dr. Hoffman for likely irritable bowel syndrome.  He has been diagnosed with small bowel bacterial overgrowth (SBBO).  He did complete a course of Xifaxan.  He is currently on Librax for 3 months.  He still continues to complain of abdominal cramping and bloating.  He reports his symptoms are intermittent -- they will typically occur after he eats, they are mostly described as being a dull constant ache.  It is mostly in the right upper quadrant.  It is associated with nausea and bloating but no vomiting.  He cannot identify any particular food types as triggers.  He reports his bowel movements are regular occasionally he would experience diarrhea but denies melena or hematochezia.  He does work in the [employer] office and contributes some of his symptoms to stress.  He is currently on Protonix. ( We were unable to get Dexilant approved through the insurance).  He also has recently been diagnosed with arthritis and is taking Celebrex.  He denies any caffeine intake.  Reports he takes decaffeinated tea and coffee one to 2 cups a day and occasional alcohol/weak.  Denies smoking.  Reports appetite is fair.  Denies any unintentional weight loss or unexpected weight gain.    REVIEW OF SYSTEMS  Review of Systems   Constitutional: Negative.    Respiratory: Negative.    Cardiovascular: Negative.    Gastrointestinal: Positive for abdominal distention, abdominal pain and nausea.   Genitourinary: Negative.    Musculoskeletal: Negative.          ACTIVE PROBLEMS  Patient Active Problem List    Diagnosis   • Guillermo's esophagus without dysplasia [K22.70]     Overview  Note:     Added automatically from request for surgery [id number]     • Gastroesophageal reflux disease [K21.9]     Overview Note:     Added automatically from request for surgery [id number]     • Gastroesophageal reflux disease with esophagitis [K21.0]   • Small intestinal bacterial overgrowth [K63.89]   • Positive lactulose breath hydrogen test [R88.8]   • Right upper quadrant abdominal pain [R10.11]   • Nausea & vomiting [R11.2]   • Bloating [R14.0]   • Hypertension [I10]   • Epididymitis [N45.1]         PAST MEDICAL HISTORY  Past Medical History:   Diagnosis Date   • Guillermo's esophagus    • GERD (gastroesophageal reflux disease)    • Hyperlipidemia    • Hypertension          SURGICAL HISTORY  Past Surgical History:   Procedure Laterality Date   • COLONOSCOPY N/A 3/14/2017    Procedure: COLONOSCOPY with polypectomy ;  Surgeon: Tricia Addison MD;  Location: [facility];  Service:    • ENDOSCOPY N/A 10/27/2016    Procedure: ESOPHAGOGASTRODUODENOSCOPY WITH BILE COLLECTION / RANDOM GASTRIC BIOPSIES,DUODENAL AND DISTAL ESOPHAGEAL BIOPSIES  AND ALFREDO TEST BIOPSY;  Surgeon: Tricia Addison MD;  Location: [facility];  Service:    • WISDOM TOOTH EXTRACTION           FAMILY HISTORY  Family History   Problem Relation Age of Onset   • Heart attack Paternal Grandmother    • Hypertension Paternal Grandfather    • Heart attack Paternal Grandfather          SOCIAL HISTORY  Social History     Occupational History   • Not on file.     Social History Main Topics   • Smoking status: Light Tobacco Smoker     Years: 10.00     Types: Cigarettes, Cigars   • Smokeless tobacco: Not on file      Comment: monthly   • Alcohol use 1.8 - 2.4 oz/week     3 - 4 Cans of beer per week      Comment: 3-4 times a week   • Drug use: No   • Sexual activity: Defer         CURRENT MEDICATIONS    Current Outpatient Prescriptions:   •  celecoxib (CeleBREX) 200 MG capsule, TAKE ONE CAPSULE BY MOUTH DAILY, Disp: , Rfl:   •  atorvastatin (LIPITOR) 20 MG tablet, Take  "20 mg by mouth Daily., Disp: , Rfl:   •  clidinium-chlordiazePOXIDE (LIBRAX) 5-2.5 MG per capsule, Take 1 capsule by mouth 3 (Three) Times a Day With Meals., Disp: 90 capsule, Rfl: 3  •  lisinopril-hydrochlorothiazide (PRINZIDE,ZESTORETIC) 20-12.5 MG per tablet, Take 1 tablet by mouth Daily., Disp: , Rfl:   •  meloxicam (MOBIC) 7.5 MG tablet, Take 7.5 mg by mouth Daily., Disp: , Rfl:   •  pantoprazole (PROTONIX) 40 MG EC tablet, Take 1 tablet by mouth Daily., Disp: 90 tablet, Rfl: 3  •  rifaximin (XIFAXAN) 550 MG tablet, Take 1 tablet by mouth 3 (Three) Times a Day., Disp: 42 tablet, Rfl: 0    ALLERGIES  Review of patient's allergies indicates no known allergies.    VITALS  Vitals:    09/15/17 0836   BP: 120/78   Weight: 222 lb (101 kg)   Height: 69\" (175.3 cm)       LAST RESULTS   Office Visit on 04/27/2017   Component Date Value Ref Range Status   • IgA 05/23/2017 260  90 - 386 mg/dL Final   • Gliadin Deamidated Peptide Ab, IgA 05/23/2017 7  0 - 19 units Final                       Negative                   0 - 19                     Weak Positive             20 - 30                     Moderate to Strong Positive   >30   • Deaminated Gliadin Ab IgG 05/23/2017 2  0 - 19 units Final                       Negative                   0 - 19                     Weak Positive             20 - 30                     Moderate to Strong Positive   >30   • Tissue Transglutaminase IgA 05/23/2017 <2  0 - 3 U/mL Final                                  Negative        0 -  3                                Weak Positive   4 - 10                                Positive           >10   Tissue Transglutaminase (tTG) has been identified   as the endomysial antigen.  Studies have demonstr-   ated that endomysial IgA antibodies have over 99%   specificity for gluten sensitive enteropathy.   • Tissue Transglutaminase IgG 05/23/2017 <2  0 - 5 U/mL Final                                  Negative        0 - 5                               "  Weak Positive   6 - 9                                Positive           >9     No results found.    PHYSICAL EXAM  Physical Exam   Constitutional: He is oriented to person, place, and time. He appears well-developed and well-nourished.   HENT:   Head: Normocephalic and atraumatic.   Eyes: No scleral icterus.   Neck: Normal range of motion. Neck supple.   Cardiovascular: Normal rate, regular rhythm and normal heart sounds.    Pulmonary/Chest: Breath sounds normal.   Abdominal:   Soft, nondistended, nontender positive bowel sounds in all 4 quadrants.   Musculoskeletal: He exhibits no edema.   Neurological: He is alert and oriented to person, place, and time.   Skin: Skin is warm and dry.   Nursing note and vitals reviewed.      ASSESSMENT  GERD  Guillermo's esophagus without dysplasia  Abdominal pain-right upper quadrant  Bloating  Nausea  NSAID use    SBBO - on Librax and follows with Dr. Hoffman.    We discussed proceeding with annual upper endoscopy/EGD regarding Guillermo's esophagus.  Pros and cons/risks and benefits were discussed in detail.  We discussed the procedure, risks, benefits, complications including but not limited to risk of bleeding, perforation requiring emergent additional procedures, aspiration and other complications associated with anesthesia.  Patient understands and gives verbal informed consent.      PLAN  EGD will be scheduled [facility].  He will have to stay off all blood thinners including NSAIDs for at least 5-7 days.  Follow-up after EGD.  Patient was advised to call the office sooner should he have worsening symptoms or go to the nearest emergency room.

## 2017-09-15 NOTE — H&P
PATIENT INFORMATION  Elvis Vallejo   - 1975    CHIEF COMPLAINT  Chief Complaint   Patient presents with   • Follow-up   • Pre-op Exam     1 yr EGD due,   Guillermo's esophagus  GERD      HISTORY OF PRESENT ILLNESS  HPI  Patient presents to the office today for follow-up and to discuss his annual upper endoscopy.  Since his last visit with me he was seen by Dr. Hoffman for likely irritable bowel syndrome.  He has been diagnosed with small bowel bacterial overgrowth (SBBO).  He did complete a course of Xifaxan.  He is currently on Librax for 3 months.  He still continues to complain of abdominal cramping and bloating.  He reports his symptoms are intermittent -- they will typically occur after he eats, they are mostly described as being a dull constant ache.  It is mostly in the right upper quadrant.  It is associated with nausea and bloating but no vomiting.  He cannot identify any particular food types as triggers.  He reports his bowel movements are regular occasionally he would experience diarrhea but denies melena or hematochezia.  He does work in the [employer] office and contributes some of his symptoms to stress.  He is currently on Protonix. ( We were unable to get Dexilant approved through the insurance).  He also has recently been diagnosed with arthritis and is taking Celebrex.  He denies any caffeine intake.  Reports he takes decaffeinated tea and coffee one to 2 cups a day and occasional alcohol/weak.  Denies smoking.  Reports appetite is fair.  Denies any unintentional weight loss or unexpected weight gain.    REVIEW OF SYSTEMS  Review of Systems   Constitutional: Negative.    Respiratory: Negative.    Cardiovascular: Negative.    Gastrointestinal: Positive for abdominal distention, abdominal pain and nausea.   Genitourinary: Negative.    Musculoskeletal: Negative.          ACTIVE PROBLEMS  Patient Active Problem List    Diagnosis   • Guillermo's esophagus without dysplasia [K22.70]     Overview  Note:     Added automatically from request for surgery [id number]     • Gastroesophageal reflux disease [K21.9]     Overview Note:     Added automatically from request for surgery [id number]     • Gastroesophageal reflux disease with esophagitis [K21.0]   • Small intestinal bacterial overgrowth [K63.89]   • Positive lactulose breath hydrogen test [R88.8]   • Right upper quadrant abdominal pain [R10.11]   • Nausea & vomiting [R11.2]   • Bloating [R14.0]   • Hypertension [I10]   • Epididymitis [N45.1]         PAST MEDICAL HISTORY  Past Medical History:   Diagnosis Date   • Guillermo's esophagus    • GERD (gastroesophageal reflux disease)    • Hyperlipidemia    • Hypertension          SURGICAL HISTORY  Past Surgical History:   Procedure Laterality Date   • COLONOSCOPY N/A 3/14/2017    Procedure: COLONOSCOPY with polypectomy ;  Surgeon: Tricia Addison MD;  Location: [facility];  Service:    • ENDOSCOPY N/A 10/27/2016    Procedure: ESOPHAGOGASTRODUODENOSCOPY WITH BILE COLLECTION / RANDOM GASTRIC BIOPSIES,DUODENAL AND DISTAL ESOPHAGEAL BIOPSIES  AND ALFREDO TEST BIOPSY;  Surgeon: Tricia Addison MD;  Location: [facility];  Service:    • WISDOM TOOTH EXTRACTION           FAMILY HISTORY  Family History   Problem Relation Age of Onset   • Heart attack Paternal Grandmother    • Hypertension Paternal Grandfather    • Heart attack Paternal Grandfather          SOCIAL HISTORY  Social History     Occupational History   • Not on file.     Social History Main Topics   • Smoking status: Light Tobacco Smoker     Years: 10.00     Types: Cigarettes, Cigars   • Smokeless tobacco: Not on file      Comment: monthly   • Alcohol use 1.8 - 2.4 oz/week     3 - 4 Cans of beer per week      Comment: 3-4 times a week   • Drug use: No   • Sexual activity: Defer         CURRENT MEDICATIONS    Current Outpatient Prescriptions:   •  celecoxib (CeleBREX) 200 MG capsule, TAKE ONE CAPSULE BY MOUTH DAILY, Disp: , Rfl:   •  atorvastatin (LIPITOR) 20 MG tablet, Take  "20 mg by mouth Daily., Disp: , Rfl:   •  clidinium-chlordiazePOXIDE (LIBRAX) 5-2.5 MG per capsule, Take 1 capsule by mouth 3 (Three) Times a Day With Meals., Disp: 90 capsule, Rfl: 3  •  lisinopril-hydrochlorothiazide (PRINZIDE,ZESTORETIC) 20-12.5 MG per tablet, Take 1 tablet by mouth Daily., Disp: , Rfl:   •  meloxicam (MOBIC) 7.5 MG tablet, Take 7.5 mg by mouth Daily., Disp: , Rfl:   •  pantoprazole (PROTONIX) 40 MG EC tablet, Take 1 tablet by mouth Daily., Disp: 90 tablet, Rfl: 3  •  rifaximin (XIFAXAN) 550 MG tablet, Take 1 tablet by mouth 3 (Three) Times a Day., Disp: 42 tablet, Rfl: 0    ALLERGIES  Review of patient's allergies indicates no known allergies.    VITALS  Vitals:    09/15/17 0836   BP: 120/78   Weight: 222 lb (101 kg)   Height: 69\" (175.3 cm)       LAST RESULTS   Office Visit on 04/27/2017   Component Date Value Ref Range Status   • IgA 05/23/2017 260  90 - 386 mg/dL Final   • Gliadin Deamidated Peptide Ab, IgA 05/23/2017 7  0 - 19 units Final                       Negative                   0 - 19                     Weak Positive             20 - 30                     Moderate to Strong Positive   >30   • Deaminated Gliadin Ab IgG 05/23/2017 2  0 - 19 units Final                       Negative                   0 - 19                     Weak Positive             20 - 30                     Moderate to Strong Positive   >30   • Tissue Transglutaminase IgA 05/23/2017 <2  0 - 3 U/mL Final                                  Negative        0 -  3                                Weak Positive   4 - 10                                Positive           >10   Tissue Transglutaminase (tTG) has been identified   as the endomysial antigen.  Studies have demonstr-   ated that endomysial IgA antibodies have over 99%   specificity for gluten sensitive enteropathy.   • Tissue Transglutaminase IgG 05/23/2017 <2  0 - 5 U/mL Final                                  Negative        0 - 5                               "  Weak Positive   6 - 9                                Positive           >9     No results found.    PHYSICAL EXAM  Physical Exam   Constitutional: He is oriented to person, place, and time. He appears well-developed and well-nourished.   HENT:   Head: Normocephalic and atraumatic.   Eyes: No scleral icterus.   Neck: Normal range of motion. Neck supple.   Cardiovascular: Normal rate, regular rhythm and normal heart sounds.    Pulmonary/Chest: Breath sounds normal.   Abdominal:   Soft, nondistended, nontender positive bowel sounds in all 4 quadrants.   Musculoskeletal: He exhibits no edema.   Neurological: He is alert and oriented to person, place, and time.   Skin: Skin is warm and dry.   Nursing note and vitals reviewed.      ASSESSMENT  GERD  Guillermo's esophagus without dysplasia  Abdominal pain-right upper quadrant  Bloating  Nausea  NSAID use    SBBO - on Librax and follows with Dr. Hoffman.    We discussed proceeding with annual upper endoscopy/EGD regarding Guillermo's esophagus.  Pros and cons/risks and benefits were discussed in detail.  We discussed the procedure, risks, benefits, complications including but not limited to risk of bleeding, perforation requiring emergent additional procedures, aspiration and other complications associated with anesthesia.  Patient understands and gives verbal informed consent.      PLAN  EGD will be scheduled [facility].  He will have to stay off all blood thinners including NSAIDs for at least 5-7 days.  Follow-up after EGD.  Patient was advised to call the office sooner should he have worsening symptoms or go to the nearest emergency room.

## 2017-09-25 ENCOUNTER — ANESTHESIA EVENT (OUTPATIENT)
Dept: PERIOP | Facility: HOSPITAL | Age: 42
End: 2017-09-25

## 2017-09-26 ENCOUNTER — ANESTHESIA (OUTPATIENT)
Dept: PERIOP | Facility: HOSPITAL | Age: 42
End: 2017-09-26

## 2017-09-26 ENCOUNTER — HOSPITAL ENCOUNTER (OUTPATIENT)
Facility: HOSPITAL | Age: 42
Setting detail: HOSPITAL OUTPATIENT SURGERY
Discharge: HOME | End: 2017-09-26
Attending: SURGERY | Admitting: SURGERY

## 2017-09-26 VITALS
BODY MASS INDEX: 32.58 KG/M2 | HEIGHT: 69 IN | WEIGHT: 220 LBS | DIASTOLIC BLOOD PRESSURE: 70 MMHG | SYSTOLIC BLOOD PRESSURE: 140 MMHG | HEART RATE: 65 BPM | OXYGEN SATURATION: 97 % | RESPIRATION RATE: 15 BRPM | TEMPERATURE: 97.9 F

## 2017-09-26 DIAGNOSIS — K22.70 BARRETT'S ESOPHAGUS WITHOUT DYSPLASIA: ICD-10-CM

## 2017-09-26 DIAGNOSIS — R14.0 BLOATING: ICD-10-CM

## 2017-09-26 DIAGNOSIS — K21.9 GASTROESOPHAGEAL REFLUX DISEASE, ESOPHAGITIS PRESENCE NOT SPECIFIED: ICD-10-CM

## 2017-09-26 DIAGNOSIS — R10.11 RIGHT UPPER QUADRANT ABDOMINAL PAIN: ICD-10-CM

## 2017-09-26 PROCEDURE — 87081 CULTURE SCREEN ONLY: CPT | Performed by: SURGERY

## 2017-09-26 PROCEDURE — 43239 EGD BIOPSY SINGLE/MULTIPLE: CPT | Performed by: SURGERY

## 2017-09-26 PROCEDURE — 25010000002 PROPOFOL 10 MG/ML EMULSION: Performed by: NURSE ANESTHETIST, CERTIFIED REGISTERED

## 2017-09-26 RX ORDER — SODIUM CHLORIDE, SODIUM LACTATE, POTASSIUM CHLORIDE, CALCIUM CHLORIDE 600; 310; 30; 20 MG/100ML; MG/100ML; MG/100ML; MG/100ML
9 INJECTION, SOLUTION INTRAVENOUS CONTINUOUS
Status: DISCONTINUED | OUTPATIENT
Start: 2017-09-26 | End: 2017-09-26 | Stop reason: HOSPADM

## 2017-09-26 RX ORDER — PROPOFOL 10 MG/ML
VIAL (ML) INTRAVENOUS AS NEEDED
Status: DISCONTINUED | OUTPATIENT
Start: 2017-09-26 | End: 2017-09-26 | Stop reason: SURG

## 2017-09-26 RX ORDER — LIDOCAINE HYDROCHLORIDE 10 MG/ML
0.5 INJECTION, SOLUTION EPIDURAL; INFILTRATION; INTRACAUDAL; PERINEURAL ONCE AS NEEDED
Status: COMPLETED | OUTPATIENT
Start: 2017-09-26 | End: 2017-09-26

## 2017-09-26 RX ORDER — GLYCOPYRROLATE 0.2 MG/ML
INJECTION INTRAMUSCULAR; INTRAVENOUS AS NEEDED
Status: DISCONTINUED | OUTPATIENT
Start: 2017-09-26 | End: 2017-09-26 | Stop reason: SURG

## 2017-09-26 RX ORDER — PANTOPRAZOLE SODIUM 40 MG/1
40 TABLET, DELAYED RELEASE ORAL DAILY
Qty: 30 TABLET | Refills: 0 | Status: SHIPPED | OUTPATIENT
Start: 2017-09-26 | End: 2017-09-27 | Stop reason: ALTCHOICE

## 2017-09-26 RX ORDER — MAGNESIUM HYDROXIDE 1200 MG/15ML
LIQUID ORAL AS NEEDED
Status: DISCONTINUED | OUTPATIENT
Start: 2017-09-26 | End: 2017-09-26 | Stop reason: HOSPADM

## 2017-09-26 RX ORDER — SODIUM CHLORIDE 0.9 % (FLUSH) 0.9 %
1-10 SYRINGE (ML) INJECTION AS NEEDED
Status: DISCONTINUED | OUTPATIENT
Start: 2017-09-26 | End: 2017-09-26 | Stop reason: HOSPADM

## 2017-09-26 RX ORDER — LIDOCAINE HYDROCHLORIDE 20 MG/ML
INJECTION, SOLUTION INFILTRATION; PERINEURAL AS NEEDED
Status: DISCONTINUED | OUTPATIENT
Start: 2017-09-26 | End: 2017-09-26 | Stop reason: SURG

## 2017-09-26 RX ORDER — SODIUM CHLORIDE 9 MG/ML
40 INJECTION, SOLUTION INTRAVENOUS AS NEEDED
Status: DISCONTINUED | OUTPATIENT
Start: 2017-09-26 | End: 2017-09-26 | Stop reason: HOSPADM

## 2017-09-26 RX ORDER — SUCRALFATE 1 G/1
1 TABLET ORAL 4 TIMES DAILY
Qty: 120 TABLET | Refills: 0 | Status: SHIPPED | OUTPATIENT
Start: 2017-09-26 | End: 2017-10-26

## 2017-09-26 RX ADMIN — GLYCOPYRROLATE 0.2 MG: 0.2 INJECTION INTRAMUSCULAR; INTRAVENOUS at 09:19

## 2017-09-26 RX ADMIN — PROPOFOL 20 MG: 10 INJECTION, EMULSION INTRAVENOUS at 09:19

## 2017-09-26 RX ADMIN — PROPOFOL 50 MG: 10 INJECTION, EMULSION INTRAVENOUS at 09:24

## 2017-09-26 RX ADMIN — PROPOFOL 50 MG: 10 INJECTION, EMULSION INTRAVENOUS at 09:22

## 2017-09-26 RX ADMIN — LIDOCAINE HYDROCHLORIDE 60 MG: 20 INJECTION, SOLUTION INFILTRATION; PERINEURAL at 09:19

## 2017-09-26 RX ADMIN — SODIUM CHLORIDE, POTASSIUM CHLORIDE, SODIUM LACTATE AND CALCIUM CHLORIDE: 600; 310; 30; 20 INJECTION, SOLUTION INTRAVENOUS at 09:00

## 2017-09-26 RX ADMIN — PROPOFOL 50 MG: 10 INJECTION, EMULSION INTRAVENOUS at 09:32

## 2017-09-26 RX ADMIN — SODIUM CHLORIDE, POTASSIUM CHLORIDE, SODIUM LACTATE AND CALCIUM CHLORIDE 9 ML/HR: 600; 310; 30; 20 INJECTION, SOLUTION INTRAVENOUS at 08:40

## 2017-09-26 RX ADMIN — LIDOCAINE HYDROCHLORIDE 0.5 ML: 10 INJECTION, SOLUTION EPIDURAL; INFILTRATION; INTRACAUDAL; PERINEURAL at 08:39

## 2017-09-26 RX ADMIN — PROPOFOL 50 MG: 10 INJECTION, EMULSION INTRAVENOUS at 09:25

## 2017-09-26 RX ADMIN — PROPOFOL 50 MG: 10 INJECTION, EMULSION INTRAVENOUS at 09:28

## 2017-09-26 RX ADMIN — PROPOFOL 100 MG: 10 INJECTION, EMULSION INTRAVENOUS at 09:23

## 2017-09-26 NOTE — PLAN OF CARE
Problem: Patient Care Overview (Adult)  Goal: Adult Individualization and Mutuality  Outcome: Ongoing (interventions implemented as appropriate)    09/26/17 0809   Individualization   Patient Specific Preferences goes by hector

## 2017-09-26 NOTE — ANESTHESIA PREPROCEDURE EVALUATION
Anesthesia Evaluation     Patient summary reviewed and Nursing notes reviewed   no history of anesthetic complications:  NPO Solid Status: > 8 hours  NPO Liquid Status: > 8 hours     Airway   Mallampati: II  TM distance: >3 FB  Neck ROM: full  no difficulty expected  Dental - normal exam     Pulmonary - normal exam    breath sounds clear to auscultation  Sleep apnea: snores.  Cardiovascular - normal exam  Exercise tolerance: good (4-7 METS)    Rhythm: regular  Rate: normal    (+) hypertension (no meds this am) well controlled, hyperlipidemia      Neuro/Psych- negative ROS  GI/Hepatic/Renal/Endo    (+)  GERD ([place]),     Musculoskeletal     Abdominal   (+) obese,    Substance History      OB/GYN negative ob/gyn ROS         Other   (+) arthritis (shoulders)                                     Anesthesia Plan    ASA 2     MAC     intravenous induction   Anesthetic plan and risks discussed with patient and mother.

## 2017-09-26 NOTE — PLAN OF CARE
Problem: Patient Care Overview (Adult)  Goal: Plan of Care Review  Outcome: Ongoing (interventions implemented as appropriate)    09/26/17 1001   Coping/Psychosocial Response Interventions   Plan Of Care Reviewed With patient;family   Patient Care Overview   Progress no change   Outcome Evaluation   Outcome Summary/Follow up Plan stable       Goal: Adult Individualization and Mutuality  Outcome: Ongoing (interventions implemented as appropriate)  Goal: Discharge Needs Assessment  Outcome: Ongoing (interventions implemented as appropriate)    Problem: GI Endoscopy (Adult)  Goal: Signs and Symptoms of Listed Potential Problems Will be Absent or Manageable (GI Endoscopy)  Outcome: Ongoing (interventions implemented as appropriate)

## 2017-09-26 NOTE — PLAN OF CARE
Problem: Patient Care Overview (Adult)  Goal: Plan of Care Review  Outcome: Outcome(s) achieved Date Met:  09/26/17 09/26/17 1055   Coping/Psychosocial Response Interventions   Plan Of Care Reviewed With patient;family   Patient Care Overview   Progress no change   Outcome Evaluation   Outcome Summary/Follow up Plan stable       Goal: Adult Individualization and Mutuality  Outcome: Outcome(s) achieved Date Met:  09/26/17  Goal: Discharge Needs Assessment  Outcome: Outcome(s) achieved Date Met:  09/26/17    Problem: GI Endoscopy (Adult)  Goal: Signs and Symptoms of Listed Potential Problems Will be Absent or Manageable (GI Endoscopy)  Outcome: Outcome(s) achieved Date Met:  09/26/17

## 2017-09-26 NOTE — ANESTHESIA POSTPROCEDURE EVALUATION
Patient: Elvis Vallejo    Procedure Summary     Date Anesthesia Start Anesthesia Stop Room / Location    09/26/17 0914 0936 [facility] / [facility]       Procedure Diagnosis Surgeon Provider    ESOPHAGOGASTRODUODENOSCOPY with biopsies, stomach biopsy for  ALFREDO test (N/A Esophagus) Bile reflux gastritis; Gastritis; Irregular Z line of esophagus  (Bloating [R14.0]; Guillermo's esophagus without dysplasia [K22.70]; Right upper quadrant abdominal pain [R10.11]; Gastroesophageal reflux disease, esophagitis presence not specified [K21.9]) MD Jaime Beth CRNA          Anesthesia Type: MAC  Last vitals  BP   140/70 (09/26/17 1000)    Temp   97.9 °F (36.6 °C) (09/26/17 0940)    Pulse   65 (09/26/17 1000)   Resp   15 (09/26/17 1000)    SpO2   97 % (09/26/17 1000)      Post Anesthesia Care and Evaluation    Patient location during evaluation: PHASE II  Patient participation: complete - patient participated  Level of consciousness: awake and alert  Pain score: 0  Pain management: adequate  Airway patency: patent  Anesthetic complications: No anesthetic complications  PONV Status: none  Cardiovascular status: acceptable  Respiratory status: acceptable  Hydration status: acceptable

## 2017-09-26 NOTE — PLAN OF CARE
Problem: GI Endoscopy (Adult)  Goal: Signs and Symptoms of Listed Potential Problems Will be Absent or Manageable (GI Endoscopy)  Outcome: Ongoing (interventions implemented as appropriate)    09/26/17 0805   GI Endoscopy   Problems Assessed (GI Endoscopy) all   Problems Present (GI Endoscopy) none

## 2017-09-26 NOTE — OP NOTE
EGD Procedure Note    Date of procedure: 9/26/17    Pre-operative Diagnosis: Bloating, right upper quadrant abdominal pain, Guillermo's esophagus without dysplasia, gastroesophageal reflux, one year surveillance for Guillermo's     Post-operative Diagnosis: Bile reflux                Gastritis                Irregular Z line of esophagus                Short segment Guillermo's    Procedure:  EGD with multiple biopsies and CLOtest    Surgeon: Tricia Addison M.D.    Anesthetic: MAC per Jaime Breeding CRNA    EBL : Minimal    Complications : None    Indications:  Patient is a 42year-old male with a known history of short segment Guillermo's presents for 1 year surveillance. He continues to also complain of bloating and right upper quadrant abdominal pain and acid reflux. As part of his further workup he was advised to undergo upper endoscopy.  Procedure, risks, benefits, complications including but not limited to risk of bleeding, perforation requiring emergent additional procedures, complications associated with anesthetic were thoroughly discussed with the patient understood and gave informed consent.    Findings/Treatments:    -Guillermo's esophagus, 1 cm in length ; biopsies were obtained, bile reflux gastritis noted particularly in the prepyloric antral area biopsies obtained for CLOtest as well as random gastric biopsies obtained.      Recommendations:  -Acid suppression with a proton pump inhibitor and Carafate, Rx were E scribed., -Await pathology., -Await ALFREDO test result and treat for Helicobacter pylori if positive., -Follow up with me.  - GERD dietary and lifestyle modification discussed and educational materials provided.      Technique:    After discussing the benefits and risks of an EGD, benefits and risks not limited to but including:  Bleeding, infection, perforation, aspiration; informed consent was signed.  The patient was taken into the endoscopy suite at [facility] and placed in the left  lateral decubitus position.  MAC anesthesia was induced under appropriate monitoring.  Bite block was placed and the gastroscope was inserted thru such and advanced under direct vision to second portion of the duodenum.  A careful inspection was made as the gastroscope was withdrawn, including a retroflexed view of the proximal stomach; findings and interventions are described below.  Immediately upon endoscopic visualization active bile reflux was noted.  There was diffuse gastritis but more prominent in the prepyloric antral area.  Scope was traversed through the pylorus into the first and second portion of the duodenum.  No duodenitis noted.  Scope was brought back into the stomach.  In the antrum/prepyloric area biopsies were obtained for CLOtest and multiple random gastric biopsies were also obtained.  Cold biopsy forceps was used, EBL minimal and hemostasis assured. Scope was then gradually withdrawn and retroflex examination performed in proximal stomach with no evidence of hiatal hernia.  Scope was straightened out and gradually withdrawn LES was at 38 cm from the incisors.  Irregular Z line of distal esophagus noted and a short segment of Guillermo's 1 cm noted.  Four-quadrant biopsies obtained using cold biopsy forceps, EBL minimal and hemostasis assured.  Scope was then gradually withdrawn carefully evaluating the remaining esophagus-no gross mucosal lesions or polyps noted.  Scope was completely withdrawn, vocal cords were moving normally.    Patient was awakened, his anesthesia was reversed and he was taken to recovery room in stable condition having tolerated his procedure well with no immediate apparent complications.        Tricia Addison MD

## 2017-09-26 NOTE — PLAN OF CARE
Problem: Patient Care Overview (Adult)  Goal: Plan of Care Review  Outcome: Ongoing (interventions implemented as appropriate)    09/26/17 0805   Coping/Psychosocial Response Interventions   Plan Of Care Reviewed With patient   Patient Care Overview   Progress improving   Outcome Evaluation   Outcome Summary/Follow up Plan vss, ready for procedure

## 2017-09-26 NOTE — DISCHARGE INSTRUCTIONS
Hold Celebrex until at least Monday  No aspirin, advil, aleve, ibuprofen, all NSAIDS for at least 5 days

## 2017-09-26 NOTE — H&P (VIEW-ONLY)
PATIENT INFORMATION  Elvis Vallejo   - 1975    CHIEF COMPLAINT  Chief Complaint   Patient presents with   • Follow-up   • Pre-op Exam     1 yr EGD due,   Guillermo's esophagus  GERD      HISTORY OF PRESENT ILLNESS  HPI  Patient presents to the office today for follow-up and to discuss his annual upper endoscopy.  Since his last visit with me he was seen by Dr. Hoffman for likely irritable bowel syndrome.  He has been diagnosed with small bowel bacterial overgrowth (SBBO).  He did complete a course of Xifaxan.  He is currently on Librax for 3 months.  He still continues to complain of abdominal cramping and bloating.  He reports his symptoms are intermittent -- they will typically occur after he eats, they are mostly described as being a dull constant ache.  It is mostly in the right upper quadrant.  It is associated with nausea and bloating but no vomiting.  He cannot identify any particular food types as triggers.  He reports his bowel movements are regular occasionally he would experience diarrhea but denies melena or hematochezia.  He does work in the [employer] office and contributes some of his symptoms to stress.  He is currently on Protonix. ( We were unable to get Dexilant approved through the insurance).  He also has recently been diagnosed with arthritis and is taking Celebrex.  He denies any caffeine intake.  Reports he takes decaffeinated tea and coffee one to 2 cups a day and occasional alcohol/weak.  Denies smoking.  Reports appetite is fair.  Denies any unintentional weight loss or unexpected weight gain.    REVIEW OF SYSTEMS  Review of Systems   Constitutional: Negative.    Respiratory: Negative.    Cardiovascular: Negative.    Gastrointestinal: Positive for abdominal distention, abdominal pain and nausea.   Genitourinary: Negative.    Musculoskeletal: Negative.          ACTIVE PROBLEMS  Patient Active Problem List    Diagnosis   • Guillermo's esophagus without dysplasia [K22.70]     Overview  Note:     Added automatically from request for surgery [id number]     • Gastroesophageal reflux disease [K21.9]     Overview Note:     Added automatically from request for surgery [id number]     • Gastroesophageal reflux disease with esophagitis [K21.0]   • Small intestinal bacterial overgrowth [K63.89]   • Positive lactulose breath hydrogen test [R88.8]   • Right upper quadrant abdominal pain [R10.11]   • Nausea & vomiting [R11.2]   • Bloating [R14.0]   • Hypertension [I10]   • Epididymitis [N45.1]         PAST MEDICAL HISTORY  Past Medical History:   Diagnosis Date   • Guillermo's esophagus    • GERD (gastroesophageal reflux disease)    • Hyperlipidemia    • Hypertension          SURGICAL HISTORY  Past Surgical History:   Procedure Laterality Date   • COLONOSCOPY N/A 3/14/2017    Procedure: COLONOSCOPY with polypectomy ;  Surgeon: Tricia Addison MD;  Location: [facility];  Service:    • ENDOSCOPY N/A 10/27/2016    Procedure: ESOPHAGOGASTRODUODENOSCOPY WITH BILE COLLECTION / RANDOM GASTRIC BIOPSIES,DUODENAL AND DISTAL ESOPHAGEAL BIOPSIES  AND ALFREDO TEST BIOPSY;  Surgeon: Tricia Addison MD;  Location: [facility];  Service:    • WISDOM TOOTH EXTRACTION           FAMILY HISTORY  Family History   Problem Relation Age of Onset   • Heart attack Paternal Grandmother    • Hypertension Paternal Grandfather    • Heart attack Paternal Grandfather          SOCIAL HISTORY  Social History     Occupational History   • Not on file.     Social History Main Topics   • Smoking status: Light Tobacco Smoker     Years: 10.00     Types: Cigarettes, Cigars   • Smokeless tobacco: Not on file      Comment: monthly   • Alcohol use 1.8 - 2.4 oz/week     3 - 4 Cans of beer per week      Comment: 3-4 times a week   • Drug use: No   • Sexual activity: Defer         CURRENT MEDICATIONS    Current Outpatient Prescriptions:   •  celecoxib (CeleBREX) 200 MG capsule, TAKE ONE CAPSULE BY MOUTH DAILY, Disp: , Rfl:   •  atorvastatin (LIPITOR) 20 MG tablet, Take  "20 mg by mouth Daily., Disp: , Rfl:   •  clidinium-chlordiazePOXIDE (LIBRAX) 5-2.5 MG per capsule, Take 1 capsule by mouth 3 (Three) Times a Day With Meals., Disp: 90 capsule, Rfl: 3  •  lisinopril-hydrochlorothiazide (PRINZIDE,ZESTORETIC) 20-12.5 MG per tablet, Take 1 tablet by mouth Daily., Disp: , Rfl:   •  meloxicam (MOBIC) 7.5 MG tablet, Take 7.5 mg by mouth Daily., Disp: , Rfl:   •  pantoprazole (PROTONIX) 40 MG EC tablet, Take 1 tablet by mouth Daily., Disp: 90 tablet, Rfl: 3  •  rifaximin (XIFAXAN) 550 MG tablet, Take 1 tablet by mouth 3 (Three) Times a Day., Disp: 42 tablet, Rfl: 0    ALLERGIES  Review of patient's allergies indicates no known allergies.    VITALS  Vitals:    09/15/17 0836   BP: 120/78   Weight: 222 lb (101 kg)   Height: 69\" (175.3 cm)       LAST RESULTS   Office Visit on 04/27/2017   Component Date Value Ref Range Status   • IgA 05/23/2017 260  90 - 386 mg/dL Final   • Gliadin Deamidated Peptide Ab, IgA 05/23/2017 7  0 - 19 units Final                       Negative                   0 - 19                     Weak Positive             20 - 30                     Moderate to Strong Positive   >30   • Deaminated Gliadin Ab IgG 05/23/2017 2  0 - 19 units Final                       Negative                   0 - 19                     Weak Positive             20 - 30                     Moderate to Strong Positive   >30   • Tissue Transglutaminase IgA 05/23/2017 <2  0 - 3 U/mL Final                                  Negative        0 -  3                                Weak Positive   4 - 10                                Positive           >10   Tissue Transglutaminase (tTG) has been identified   as the endomysial antigen.  Studies have demonstr-   ated that endomysial IgA antibodies have over 99%   specificity for gluten sensitive enteropathy.   • Tissue Transglutaminase IgG 05/23/2017 <2  0 - 5 U/mL Final                                  Negative        0 - 5                               "  Weak Positive   6 - 9                                Positive           >9     No results found.    PHYSICAL EXAM  Physical Exam   Constitutional: He is oriented to person, place, and time. He appears well-developed and well-nourished.   HENT:   Head: Normocephalic and atraumatic.   Eyes: No scleral icterus.   Neck: Normal range of motion. Neck supple.   Cardiovascular: Normal rate, regular rhythm and normal heart sounds.    Pulmonary/Chest: Breath sounds normal.   Abdominal:   Soft, nondistended, nontender positive bowel sounds in all 4 quadrants.   Musculoskeletal: He exhibits no edema.   Neurological: He is alert and oriented to person, place, and time.   Skin: Skin is warm and dry.   Nursing note and vitals reviewed.      ASSESSMENT  GERD  Guillermo's esophagus without dysplasia  Abdominal pain-right upper quadrant  Bloating  Nausea  NSAID use    SBBO - on Librax and follows with Dr. Hoffman.    We discussed proceeding with annual upper endoscopy/EGD regarding Guillermo's esophagus.  Pros and cons/risks and benefits were discussed in detail.  We discussed the procedure, risks, benefits, complications including but not limited to risk of bleeding, perforation requiring emergent additional procedures, aspiration and other complications associated with anesthesia.  Patient understands and gives verbal informed consent.      PLAN  EGD will be scheduled [facility].  He will have to stay off all blood thinners including NSAIDs for at least 5-7 days.  Follow-up after EGD.  Patient was advised to call the office sooner should he have worsening symptoms or go to the nearest emergency room.

## 2017-09-27 LAB — UREASE TISS QL: POSITIVE

## 2017-09-27 RX ORDER — CLARITHROMYCIN 500 MG/1
500 TABLET, COATED ORAL 2 TIMES DAILY
Qty: 28 TABLET | Refills: 0 | Status: SHIPPED | OUTPATIENT
Start: 2017-09-27 | End: 2017-10-11

## 2017-09-27 RX ORDER — AMOXICILLIN 500 MG/1
500 CAPSULE ORAL 2 TIMES DAILY
Qty: 28 CAPSULE | Refills: 0 | Status: SHIPPED | OUTPATIENT
Start: 2017-09-27 | End: 2017-10-11

## 2017-09-27 RX ORDER — LANSOPRAZOLE 30 MG/1
30 CAPSULE, DELAYED RELEASE ORAL DAILY
Qty: 14 CAPSULE | Refills: 0 | Status: SHIPPED | OUTPATIENT
Start: 2017-09-27 | End: 2017-10-11

## 2017-09-29 LAB
LAB AP CASE REPORT: NORMAL
Lab: NORMAL
PATH REPORT.FINAL DX SPEC: NORMAL

## 2017-10-03 ENCOUNTER — OFFICE VISIT (OUTPATIENT)
Dept: GASTROENTEROLOGY | Facility: CLINIC | Age: 42
End: 2017-10-03

## 2017-10-03 VITALS — WEIGHT: 225.2 LBS | BODY MASS INDEX: 33.36 KG/M2 | HEIGHT: 69 IN

## 2017-10-03 DIAGNOSIS — A04.8 POSITIVE H. PYLORI TEST: ICD-10-CM

## 2017-10-03 DIAGNOSIS — R10.84 GENERALIZED ABDOMINAL PAIN: Primary | ICD-10-CM

## 2017-10-03 DIAGNOSIS — K63.89 SMALL INTESTINAL BACTERIAL OVERGROWTH: ICD-10-CM

## 2017-10-03 PROCEDURE — 99213 OFFICE O/P EST LOW 20 MIN: CPT | Performed by: INTERNAL MEDICINE

## 2017-10-18 ENCOUNTER — OFFICE VISIT (OUTPATIENT)
Dept: SURGERY | Facility: CLINIC | Age: 42
End: 2017-10-18

## 2017-10-18 VITALS
WEIGHT: 226 LBS | DIASTOLIC BLOOD PRESSURE: 80 MMHG | SYSTOLIC BLOOD PRESSURE: 118 MMHG | BODY MASS INDEX: 33.47 KG/M2 | HEIGHT: 69 IN

## 2017-10-18 DIAGNOSIS — K21.00 GASTROESOPHAGEAL REFLUX DISEASE WITH ESOPHAGITIS: ICD-10-CM

## 2017-10-18 DIAGNOSIS — R10.11 RIGHT UPPER QUADRANT ABDOMINAL PAIN: Primary | ICD-10-CM

## 2017-10-18 DIAGNOSIS — R10.13 EPIGASTRIC PAIN: ICD-10-CM

## 2017-10-18 DIAGNOSIS — K30 INDIGESTION: ICD-10-CM

## 2017-10-18 PROCEDURE — 99213 OFFICE O/P EST LOW 20 MIN: CPT | Performed by: SURGERY

## 2017-10-18 RX ORDER — CELECOXIB 200 MG/1
CAPSULE ORAL
COMMUNITY
Start: 2017-10-13 | End: 2017-10-18

## 2017-10-18 NOTE — PROGRESS NOTES
"    PATIENT INFORMATION  Elvis Vallejo   - 1975    CHIEF COMPLAINT  Chief Complaint   Patient presents with   • Follow-up   F/U EGD,    HISTORY OF PRESENT ILLNESS  HPI  Patient was as the office today for follow-up and to discuss EGD results.  He did test positive for H. pylori.  Was started on Prevpac therapy which he reports he is just completed.  This is second round with the H pylori.  He continues to complain of abdominal pain which he describes as mostly right upper quadrant associated with bloating and indigestion.  He reports the symptoms are chronic and they pain is described as being a dull constant ache.  There is no relation to any food as the pain is there \"all the time\".  He denies any change in his bowel movements, melena, hematochezia or vomiting.  He does report occasional nausea.  He reports his acid reflux is much better he did complete 2 weeks of lansoprazole as part of the Prevpac therapy.  He is currently on PPI and Carafate.  Since his last office visit with me he also saw Dr. Hoffman.  He does have SBBO  and is currently on Librax.  I have reviewed her records.  EGD findings were discussed with him including pathology results.    Patient is very frustrated as he continues to have the upper abdominal/right upper quadrant pain.  So far all gallbladder workup has been negative.    He reports currently he is on an antibiotic for epididymitis.  He does not remember the name.    REVIEW OF SYSTEMS  Review of Systems   Constitutional: Negative.    Respiratory: Negative.    Cardiovascular: Negative.    Gastrointestinal: Positive for abdominal distention, abdominal pain and nausea.        Bloating and indigestion   Genitourinary: Negative.          ACTIVE PROBLEMS  Patient Active Problem List    Diagnosis   • Guillermo's esophagus without dysplasia [K22.70]   • Guillermo's esophagus without dysplasia [K22.70]     Overview Note:     Added automatically from request for surgery [id number]     • " Gastroesophageal reflux disease [K21.9]     Overview Note:     Added automatically from request for surgery [id number]     • Gastroesophageal reflux disease with esophagitis [K21.0]   • Small intestinal bacterial overgrowth [K63.89]   • Positive lactulose breath hydrogen test [R88.8]   • Right upper quadrant abdominal pain [R10.11]   • Nausea & vomiting [R11.2]   • Bloating [R14.0]   • Hypertension [I10]   • Epididymitis [N45.1]         PAST MEDICAL HISTORY  Past Medical History:   Diagnosis Date   • Guillermo esophagus    • Guillermo's esophagus    • GERD (gastroesophageal reflux disease)    • Hyperlipidemia    • Hypertension          SURGICAL HISTORY  Past Surgical History:   Procedure Laterality Date   • COLONOSCOPY N/A 3/14/2017    Procedure: COLONOSCOPY with polypectomy ;  Surgeon: Tricia Addison MD;  Location: [facility] OR;  Service:    • ENDOSCOPY N/A 10/27/2016    Procedure: ESOPHAGOGASTRODUODENOSCOPY WITH BILE COLLECTION / RANDOM GASTRIC BIOPSIES,DUODENAL AND DISTAL ESOPHAGEAL BIOPSIES  AND ALFREDO TEST BIOPSY;  Surgeon: Triica Addison MD;  Location: [facility] OR;  Service:    • ENDOSCOPY N/A 9/26/2017    Procedure: ESOPHAGOGASTRODUODENOSCOPY with biopsies, stomach biopsy for  ALFREDO test;  Surgeon: Tricia Addison MD;  Location: [facility] OR;  Service:    • WISDOM TOOTH EXTRACTION           FAMILY HISTORY  Family History   Problem Relation Age of Onset   • Heart attack Paternal Grandmother    • Hypertension Paternal Grandfather    • Heart attack Paternal Grandfather          SOCIAL HISTORY  Social History     Occupational History   • Not on file.     Social History Main Topics   • Smoking status: Light Tobacco Smoker     Years: 10.00     Types: Cigarettes, Cigars   • Smokeless tobacco: Never Used      Comment: monthly   • Alcohol use 1.8 - 2.4 oz/week     3 - 4 Cans of beer per week      Comment: 3-4 times a week   • Drug use: No   • Sexual activity: Defer         CURRENT MEDICATIONS    Current Outpatient Prescriptions:  "  •  atorvastatin (LIPITOR) 20 MG tablet, Take 20 mg by mouth Daily., Disp: , Rfl:   •  clidinium-chlordiazePOXIDE (LIBRAX) 5-2.5 MG per capsule, Take 1 capsule by mouth 3 (Three) Times a Day With Meals., Disp: 90 capsule, Rfl: 3  •  lisinopril-hydrochlorothiazide (PRINZIDE,ZESTORETIC) 20-12.5 MG per tablet, Take 1 tablet by mouth Daily., Disp: , Rfl:   •  sucralfate (CARAFATE) 1 g tablet, Take 1 tablet by mouth 4 (Four) Times a Day for 30 days., Disp: 120 tablet, Rfl: 0    ALLERGIES  Review of patient's allergies indicates no known allergies.    VITALS  Vitals:    10/18/17 0926   BP: 118/80   Weight: 226 lb (103 kg)   Height: 69\" (175.3 cm)       LAST RESULTS   Admission on 09/26/2017, Discharged on 09/26/2017   Component Date Value Ref Range Status   • Urease 09/26/2017 Positive* Negative Final   • Case Report 09/26/2017    Final                    Value:Surgical Pathology Report                         Case: [id number]                                  Authorizing Provider:  Tricia Addison MD       Collected:           09/26/2017 09:28 AM          Ordering Location:     [facility]   Received:            09/26/2017 10:24 AM                                 OR                                                                           Pathologist:           Washington Lopez MD                                                          Specimens:   1) - Gastric, Body, gastric biopsy                                                                  2) - Esophagus, Distal, distal esophagus biopsy x2                                        • Final Diagnosis 09/26/2017    Final                    Value:This result contains rich text formatting which cannot be displayed here.     No results found.    PHYSICAL EXAM  Physical Exam   Constitutional: He is oriented to person, place, and time. He appears well-developed and well-nourished.   Eyes: No scleral icterus.   Cardiovascular: Normal rate, regular rhythm and " normal heart sounds.    Pulmonary/Chest: Breath sounds normal.   Abdominal:   Soft, nondistended, subjective tenderness right upper quadrant no guarding no rebound no rigidity.  Positive bowel sounds in all 4 quadrants   Musculoskeletal: He exhibits no edema.   Neurological: He is alert and oriented to person, place, and time.   Skin: Skin is warm and dry.   Psychiatric: He has a normal mood and affect. His behavior is normal.   Nursing note and vitals reviewed.      ASSESSMENT  Abdominal pain-right upper quadrant/upper abdomen  GERD/ chronic reflux esophagitis  H. pylori gastritis-Prevpac therapy completed  EGD-gastric biopsies consistent with chronic inflammation  Guillermo's esophagus-recent biopsies with no dysplasia, chronic inflammation noted on biopsies concerning for chronic reflux  So far gallbladder workup has been negative.    Will obtain upper GI small bowel follow-through as well as gastric emptying scan.    This is his second time with the H. pylori gastritis.  I would recommend we obtain a urease breath test in 4 weeks after completion of therapy.  He will have to stay off of PPIs for 2 weeks prior to the test.    Small intestinal bacterial overgrowth-on Librax-will discuss with Dr. Hoffman.  Continue Librax as per her recommendations for now. She did mention starting TCA.  I will have to discuss and defer to her.    Will need another upper endoscopy in a year for surveillance for his Guillermo's esophagus or sooner if he is having problems- verbalized understanding    PLAN  Follow-up after testing.  Patient was advised to call the office sooner should he have worsening symptoms or go to the nearest emergency room.

## 2017-10-27 ENCOUNTER — HOSPITAL ENCOUNTER (OUTPATIENT)
Dept: GENERAL RADIOLOGY | Facility: HOSPITAL | Age: 42
Discharge: HOME | End: 2017-10-27
Attending: SURGERY | Admitting: SURGERY

## 2017-10-27 PROCEDURE — 74249: CPT

## 2017-11-03 ENCOUNTER — HOSPITAL ENCOUNTER (OUTPATIENT)
Dept: NUCLEAR MEDICINE | Facility: HOSPITAL | Age: 42
Discharge: HOME | End: 2017-11-03
Attending: SURGERY

## 2017-11-03 PROCEDURE — A9541 TC99M SULFUR COLLOID: HCPCS | Performed by: SURGERY

## 2017-11-03 PROCEDURE — 0 TECHNETIUM SULFUR COLLOID: Performed by: SURGERY

## 2017-11-03 PROCEDURE — 78264 GASTRIC EMPTYING IMG STUDY: CPT

## 2017-11-03 RX ADMIN — TECHNETIUM TC 99M SULFUR COLLOID 1 DOSE: KIT at 12:00

## 2017-11-08 ENCOUNTER — OFFICE VISIT (OUTPATIENT)
Dept: SURGERY | Facility: CLINIC | Age: 42
End: 2017-11-08

## 2017-11-08 VITALS
SYSTOLIC BLOOD PRESSURE: 118 MMHG | HEIGHT: 69 IN | WEIGHT: 220 LBS | DIASTOLIC BLOOD PRESSURE: 78 MMHG | BODY MASS INDEX: 32.58 KG/M2

## 2017-11-08 DIAGNOSIS — K21.00 GASTROESOPHAGEAL REFLUX DISEASE WITH ESOPHAGITIS: ICD-10-CM

## 2017-11-08 DIAGNOSIS — Z09 FOLLOW UP: Primary | ICD-10-CM

## 2017-11-08 DIAGNOSIS — R88.8: Primary | ICD-10-CM

## 2017-11-08 DIAGNOSIS — K22.70 BARRETT'S ESOPHAGUS WITHOUT DYSPLASIA: ICD-10-CM

## 2017-11-08 DIAGNOSIS — R10.11 RIGHT UPPER QUADRANT ABDOMINAL PAIN: ICD-10-CM

## 2017-11-08 PROCEDURE — 99213 OFFICE O/P EST LOW 20 MIN: CPT | Performed by: SURGERY

## 2017-11-08 NOTE — PROGRESS NOTES
"    PATIENT INFORMATION  Elvis Vallejo   - 1975    CHIEF COMPLAINT  Chief Complaint   Patient presents with   • Follow-up   F/U TESTING, SX UNCHANGED      HISTORY OF PRESENT ILLNESS  HPI  Patient was sent to the office today for follow-up and to discuss small bowel follow-through and gastric emptying scan results.He continues to complain of abdominal pain which he describes as mostly right upper quadrant associated with bloating and indigestion.  He reports the symptoms are chronic and they pain is described as being a dull constant ache.  There is no relation to any food as the pain is there \"all the time\".  He denies any change in his bowel movements, melena, hematochezia or vomiting.  He does report occasional nausea.  He reports his acid reflux is much better he did complete 2 weeks of lansoprazole as part of the Prevpac therapy.  Since his last office visit with me he also saw Dr. Hoffman.  He does have SBBO  and is currently on Librax.  I have reviewed her records.  He did stop the PPI for 2 weeks.  I would like to schedule the urease breath test to check for medication of H pylori.  I reviewed a small bowel follow-through in gastric emptying scan personally and discussed the results with him-essentially both studies were normal.    EGD findings were discussed with him including pathology results.     Patient is very frustrated as he continues to have the upper abdominal/right upper quadrant pain.  So far all gallbladder workup has been negative.      REVIEW OF SYSTEMS  Review of Systems   Constitutional: Negative.    Respiratory: Negative.    Cardiovascular: Negative.    Gastrointestinal: Positive for abdominal pain and nausea.        Bloating and indigestion   Genitourinary: Negative.    Musculoskeletal: Negative.          ACTIVE PROBLEMS  Patient Active Problem List    Diagnosis   • Guillermo's esophagus without dysplasia [K22.70]   • Guillermo's esophagus without dysplasia [K22.70]     Overview Note:    "  Added automatically from request for surgery [id number]     • Gastroesophageal reflux disease [K21.9]     Overview Note:     Added automatically from request for surgery [id number]     • Gastroesophageal reflux disease with esophagitis [K21.0]   • Small intestinal bacterial overgrowth [K63.89]   • Positive lactulose breath hydrogen test [R88.8]   • Right upper quadrant abdominal pain [R10.11]   • Nausea & vomiting [R11.2]   • Bloating [R14.0]   • Hypertension [I10]   • Epididymitis [N45.1]         PAST MEDICAL HISTORY  Past Medical History:   Diagnosis Date   • Guillermo esophagus    • Guillermo's esophagus    • GERD (gastroesophageal reflux disease)    • Hyperlipidemia    • Hypertension          SURGICAL HISTORY  Past Surgical History:   Procedure Laterality Date   • COLONOSCOPY N/A 3/14/2017    Procedure: COLONOSCOPY with polypectomy ;  Surgeon: Tricia Addison MD;  Location: [facility] [place];  Service:    • ENDOSCOPY N/A 10/27/2016    Procedure: ESOPHAGOGASTRODUODENOSCOPY WITH BILE COLLECTION / RANDOM GASTRIC BIOPSIES,DUODENAL AND DISTAL ESOPHAGEAL BIOPSIES  AND ALFREDO TEST BIOPSY;  Surgeon: Tricia Addison MD;  Location: [facility] OR;  Service:    • ENDOSCOPY N/A 9/26/2017    Procedure: ESOPHAGOGASTRODUODENOSCOPY with biopsies, stomach biopsy for  ALFREDO test;  Surgeon: Tricia Addison MD;  Location: [facility] OR;  Service:    • WISDOM TOOTH EXTRACTION           FAMILY HISTORY  Family History   Problem Relation Age of Onset   • Heart attack Paternal Grandmother    • Hypertension Paternal Grandfather    • Heart attack Paternal Grandfather          SOCIAL HISTORY  Social History     Occupational History   • Not on file.     Social History Main Topics   • Smoking status: Light Tobacco Smoker     Years: 10.00     Types: Cigarettes, Cigars   • Smokeless tobacco: Never Used      Comment: monthly   • Alcohol use 1.8 - 2.4 oz/week     3 - 4 Cans of beer per week      Comment: 3-4 times a week   • Drug use: No   • Sexual activity: Defer  "        CURRENT MEDICATIONS    Current Outpatient Prescriptions:   •  atorvastatin (LIPITOR) 20 MG tablet, Take 20 mg by mouth Daily., Disp: , Rfl:   •  clidinium-chlordiazePOXIDE (LIBRAX) 5-2.5 MG per capsule, Take 1 capsule by mouth 3 (Three) Times a Day With Meals., Disp: 90 capsule, Rfl: 3  •  lisinopril-hydrochlorothiazide (PRINZIDE,ZESTORETIC) 20-12.5 MG per tablet, Take 1 tablet by mouth Daily., Disp: , Rfl:     ALLERGIES  Review of patient's allergies indicates no known allergies.    VITALS  Vitals:    11/08/17 0919   BP: 118/78   Weight: 220 lb (99.8 kg)   Height: 69\" (175.3 cm)       LAST RESULTS   Admission on 09/26/2017, Discharged on 09/26/2017   Component Date Value Ref Range Status   • Urease 09/26/2017 Positive* Negative Final   • Case Report 09/26/2017    Final                    Value:Surgical Pathology Report                         Case: [id number]                                  Authorizing Provider:  Tricia Addison MD       Collected:           09/26/2017 09:28 AM          Ordering Location:     [facility]   Received:            09/26/2017 10:24 AM                                 OR                                                                           Pathologist:           Washington Lopez MD                                                          Specimens:   1) - Gastric, Body, gastric biopsy                                                                  2) - Esophagus, Distal, distal esophagus biopsy x2                                        • Final Diagnosis 09/26/2017    Final                    Value:This result contains rich text formatting which cannot be displayed here.     Nm Gastric Emptying    Result Date: 11/3/2017  Narrative: RADIONUCLIDE SOLID GASTRIC EMPTYING STUDY, 11/03/2017:  HISTORY: 42-year-old male complaining of one year history of cramping abdominal pain and bloating.  DOSE: 515 uCi technetium labeled sulfur colloid cooked into solid scrambled " egg administered in protocol meal.  TECHNIQUE: Anterior and posterior abdominal images were obtained every 15 minutes for 65 minutes. Measured gastric activity was utilized to calculate solid gastric emptying statistics.  COMPARISON: *  Hepatobiliary scan, 10/24/2016. *  CT abdomen/pelvis, 03/16/2017.  FINDINGS: Continuous solid gastric emptying was demonstrated throughout the exam. Normal emptying half-time was reached at 37 minutes (normal solid emptying half-time is less than 90 minutes using this technique). At the end of the procedure, 81% of initial activity had emptied. There is no evidence of significantly delayed solid gastric emptying.      Impression: 1. No evidence of delayed solid gastric emptying. 2. Normal solid emptying half-time of 37 minutes.  This report was finalized on 11/3/2017 1:19 PM by Dr. Steven Webster MD.      Fl Upper Gi W Air Contrast And Sbft    Result Date: 10/27/2017  Narrative: UPPER GI EXAMINATION AND SMALL BOWEL FOLLOW-THROUGH, 10/27/2017:  INDICATION: 42-year-old male with history of chronic reflux and known Guillermo's esophagus. Upper endoscopy one month ago showing bile gastritis in the prepyloric antrum and appearance esophagus.  TECHNIQUE: Upper GI examination was performed using double contrast technique. *  Fluoroscopy time, 05 minutes, 0 seconds. *  13 fluoroscopic images were recorded.  FINDINGS: The esophagus is normal in appearance. Minimal intermittent recumbent gastroesophageal reflux is observed with the patient in recumbent position. No hiatal hernia. No evidence of active esophagitis, esophageal stricture or mass. A 13 mm diameter barium tablet swallowed in upright position passes promptly into the stomach without obstruction or delay.  Stomach and duodenum are normal in appearance. No evidence of peptic ulcer disease or mass. The stomach empties without delay.  Jejunum and ileum are normal in caliber and appearance on small bowel follow-through. No evidence of  active inflammatory bowel disease. No small bowel stricture, diverticulum or mass is seen. Barium reached the colon promptly in about 30 minutes.      Impression: 1. Minimal intermittent recumbent gastroesophageal reflux. No hiatal hernia. 2. Esophagram is otherwise negative. 3. Negative stomach and duodenum. 4. Negative small bowel bowel follow-through.  This report was finalized on 10/27/2017 10:36 AM by Dr. Steven Webster MD.        PHYSICAL EXAM  Physical Exam   Constitutional: He is oriented to person, place, and time. He appears well-developed and well-nourished.   Cardiovascular: Normal rate, regular rhythm and normal heart sounds.    Pulmonary/Chest: Breath sounds normal.   Abdominal:   Soft, nondistended, subjective tenderness right upper quadrant, positive bowel sounds in all 4 quadrants.  No rebound no rigidity no guarding.   Musculoskeletal: He exhibits no edema.   Neurological: He is alert and oriented to person, place, and time.   Skin: Skin is warm and dry.   Nursing note and vitals reviewed.      ASSESSMENT  Abdominal pain-right upper quadrant/upper abdomen  GERD/ chronic reflux esophagitis  H. pylori gastritis-Prevpac therapy completed  EGD-gastric biopsies consistent with chronic inflammation  Guillermo's esophagus-recent biopsies with no dysplasia, chronic inflammation noted on biopsies concerning for chronic reflux  So far gallbladder workup, gastric emptying scan and upper GI small bowel follow-through have been negative.     This is his second time with the H. pylori gastritis. He is off PPIs ×2 weeks -  will check urease breath test.     Small intestinal bacterial overgrowth-on Librax-follows with Dr. Hoffman.  Continue Librax as per her recommendations for now. She did mention starting TCA.  I will have to discuss and defer to her.     Will need another upper endoscopy in a year for surveillance for his Guillermo's esophagus or sooner if he is having problems- verbalized  understanding      PLAN  Follow-up after urease breath test and  GI evaluation  Patient is advised call the office sooner should he have worsening symptoms or go to the nearest emergency room.

## 2017-12-01 ENCOUNTER — APPOINTMENT (OUTPATIENT)
Dept: LAB | Facility: HOSPITAL | Age: 42
End: 2017-12-01

## 2017-12-01 PROCEDURE — 83013 H PYLORI (C-13) BREATH: CPT | Performed by: SURGERY

## 2017-12-06 LAB — UREA BREATH TEST QL: NEGATIVE

## 2017-12-26 ENCOUNTER — OFFICE VISIT (OUTPATIENT)
Dept: GASTROENTEROLOGY | Facility: CLINIC | Age: 42
End: 2017-12-26

## 2017-12-26 VITALS
SYSTOLIC BLOOD PRESSURE: 118 MMHG | DIASTOLIC BLOOD PRESSURE: 80 MMHG | HEIGHT: 69 IN | BODY MASS INDEX: 32.61 KG/M2 | WEIGHT: 220.2 LBS

## 2017-12-26 DIAGNOSIS — K21.00 GASTROESOPHAGEAL REFLUX DISEASE WITH ESOPHAGITIS: ICD-10-CM

## 2017-12-26 DIAGNOSIS — K63.89 SMALL INTESTINAL BACTERIAL OVERGROWTH: Primary | ICD-10-CM

## 2017-12-26 DIAGNOSIS — R10.13 DYSPEPSIA: ICD-10-CM

## 2017-12-26 DIAGNOSIS — K22.70 BARRETT'S ESOPHAGUS WITHOUT DYSPLASIA: ICD-10-CM

## 2017-12-26 PROBLEM — K21.9 GASTROESOPHAGEAL REFLUX DISEASE: Status: RESOLVED | Noted: 2017-09-15 | Resolved: 2017-12-26

## 2017-12-26 PROCEDURE — 99213 OFFICE O/P EST LOW 20 MIN: CPT | Performed by: INTERNAL MEDICINE

## 2017-12-26 RX ORDER — AMITRIPTYLINE HYDROCHLORIDE 25 MG/1
25 TABLET, FILM COATED ORAL NIGHTLY
Qty: 30 TABLET | Refills: 1 | Status: SHIPPED | OUTPATIENT
Start: 2017-12-26 | End: 2018-02-08

## 2018-02-08 ENCOUNTER — OFFICE VISIT (OUTPATIENT)
Dept: GASTROENTEROLOGY | Facility: CLINIC | Age: 43
End: 2018-02-08

## 2018-02-08 VITALS
DIASTOLIC BLOOD PRESSURE: 80 MMHG | WEIGHT: 219.8 LBS | BODY MASS INDEX: 32.56 KG/M2 | SYSTOLIC BLOOD PRESSURE: 126 MMHG | HEIGHT: 69 IN

## 2018-02-08 DIAGNOSIS — K58.9 IRRITABLE BOWEL SYNDROME, UNSPECIFIED TYPE: Primary | ICD-10-CM

## 2018-02-08 PROCEDURE — 99213 OFFICE O/P EST LOW 20 MIN: CPT | Performed by: INTERNAL MEDICINE

## 2018-02-08 RX ORDER — AMITRIPTYLINE HYDROCHLORIDE 25 MG/1
50 TABLET, FILM COATED ORAL NIGHTLY
Qty: 60 TABLET | Refills: 3 | Status: SHIPPED | OUTPATIENT
Start: 2018-02-08 | End: 2018-02-26 | Stop reason: SDUPTHER

## 2018-02-08 NOTE — PROGRESS NOTES
PATIENT INFORMATION  Elvis Vallejo       - 1975    CHIEF COMPLAINT  Chief Complaint   Patient presents with   • Abdominal Pain     6 week follow up       HISTORY OF PRESENT ILLNESS  Abdominal Pain       He is here today in follow up and is doing little better on elavil 25mg. He is wanting to see if a higher dose would help even better. Abdominal pain is better. No nausea or vomiting. Eating well.weight is stable.    bm are daily.    REVIEW OF SYSTEMS  Review of Systems   Gastrointestinal: Positive for abdominal pain.   All other systems reviewed and are negative.        ACTIVE PROBLEMS  Patient Active Problem List    Diagnosis   • Guillermo's esophagus without dysplasia [K22.70]     Overview Note:     Added automatically from request for surgery [id number]     • Gastroesophageal reflux disease with esophagitis [K21.0]   • Small intestinal bacterial overgrowth [K63.89]   • Positive lactulose breath hydrogen test [R88.8]   • Right upper quadrant abdominal pain [R10.11]   • Nausea & vomiting [R11.2]   • Bloating [R14.0]   • Hypertension [I10]   • Epididymitis [N45.1]         PAST MEDICAL HISTORY  Past Medical History:   Diagnosis Date   • Guillermo esophagus    • Guillermo's esophagus    • GERD (gastroesophageal reflux disease)    • Hyperlipidemia    • Hypertension          SURGICAL HISTORY  Past Surgical History:   Procedure Laterality Date   • COLONOSCOPY N/A 3/14/2017    Procedure: COLONOSCOPY with polypectomy ;  Surgeon: Tricia Addison MD;  Location: [facility] OR;  Service:    • ENDOSCOPY N/A 10/27/2016    Procedure: ESOPHAGOGASTRODUODENOSCOPY WITH BILE COLLECTION / RANDOM GASTRIC BIOPSIES,DUODENAL AND DISTAL ESOPHAGEAL BIOPSIES  AND ALFREDO TEST BIOPSY;  Surgeon: Tricia Addison MD;  Location: [facility] OR;  Service:    • ENDOSCOPY N/A 2017    Procedure: ESOPHAGOGASTRODUODENOSCOPY with biopsies, stomach biopsy for  ALFREDO test;  Surgeon: Tricia Addison MD;  Location: [facility] OR;  Service:    • WISDOM TOOTH  "EXTRACTION           FAMILY HISTORY  Family History   Problem Relation Age of Onset   • Heart attack Paternal Grandmother    • Hypertension Paternal Grandfather    • Heart attack Paternal Grandfather          SOCIAL HISTORY  Social History     Occupational History   • Not on file.     Social History Main Topics   • Smoking status: Light Tobacco Smoker     Years: 10.00     Types: Cigarettes, Cigars   • Smokeless tobacco: Never Used      Comment: monthly   • Alcohol use 1.8 - 2.4 oz/week     3 - 4 Cans of beer per week      Comment: 3-4 times a week   • Drug use: No   • Sexual activity: Defer         CURRENT MEDICATIONS    Current Outpatient Prescriptions:   •  amitriptyline (ELAVIL) 25 MG tablet, Take 2 tablets by mouth Every Night., Disp: 60 tablet, Rfl: 3  •  atorvastatin (LIPITOR) 20 MG tablet, Take 20 mg by mouth Daily., Disp: , Rfl:   •  clidinium-chlordiazePOXIDE (LIBRAX) 5-2.5 MG per capsule, Take 1 capsule by mouth 3 (Three) Times a Day With Meals., Disp: 90 capsule, Rfl: 3  •  clotrimazole-betamethasone (LOTRISONE) 1-0.05 % cream, Apply  topically Every 12 (Twelve) Hours., Disp: 15 g, Rfl: 0  •  lisinopril-hydrochlorothiazide (PRINZIDE,ZESTORETIC) 20-12.5 MG per tablet, Take 1 tablet by mouth Daily., Disp: , Rfl:   •  metroNIDAZOLE (FLAGYL) 500 MG tablet, Take 4 pills times one, Disp: 4 tablet, Rfl: 0    ALLERGIES  Review of patient's allergies indicates no known allergies.    VITALS  Vitals:    02/08/18 0958   BP: 126/80   Weight: 99.7 kg (219 lb 12.8 oz)   Height: 175.3 cm (69.02\")       LAST RESULTS   Orders Only on 11/08/2017   Component Date Value Ref Range Status   • H. pylori Breath Test 12/01/2017 Negative  Negative Final     No results found.    PHYSICAL EXAM  Physical Exam   Constitutional: He is oriented to person, place, and time. He appears well-developed and well-nourished. No distress.   HENT:   Head: Normocephalic and atraumatic.   Eyes: EOM are normal. Pupils are equal, round, and reactive " to light.   Neck: Neck supple. No tracheal deviation present.   Cardiovascular: Normal rate, regular rhythm, normal heart sounds and intact distal pulses.  Exam reveals no gallop and no friction rub.    No murmur heard.  Pulmonary/Chest: Effort normal and breath sounds normal. No respiratory distress. He has no wheezes. He has no rales. He exhibits no tenderness.   Abdominal: Soft. Bowel sounds are normal. He exhibits no distension. There is no tenderness. There is no rebound and no guarding.   Musculoskeletal: He exhibits no edema.   Lymphadenopathy:     He has no cervical adenopathy.   Neurological: He is alert and oriented to person, place, and time.   Skin: Skin is warm. He is not diaphoretic. No erythema.   Psychiatric: He has a normal mood and affect. His behavior is normal. Judgment and thought content normal.   Nursing note and vitals reviewed.      ASSESSMENT  Diagnoses and all orders for this visit:    Irritable bowel syndrome, unspecified type    Other orders  -     amitriptyline (ELAVIL) 25 MG tablet; Take 2 tablets by mouth Every Night.          PLAN  Return in about 6 weeks (around 3/22/2018).

## 2018-02-26 RX ORDER — AMITRIPTYLINE HYDROCHLORIDE 25 MG/1
TABLET, FILM COATED ORAL
Qty: 30 TABLET | Refills: 0 | OUTPATIENT
Start: 2018-02-26

## 2018-02-26 RX ORDER — AMITRIPTYLINE HYDROCHLORIDE 25 MG/1
50 TABLET, FILM COATED ORAL NIGHTLY
Qty: 60 TABLET | Refills: 3 | Status: SHIPPED | OUTPATIENT
Start: 2018-02-26 | End: 2018-03-22 | Stop reason: SDUPTHER

## 2018-03-22 ENCOUNTER — OFFICE VISIT (OUTPATIENT)
Dept: GASTROENTEROLOGY | Facility: CLINIC | Age: 43
End: 2018-03-22

## 2018-03-22 VITALS
DIASTOLIC BLOOD PRESSURE: 78 MMHG | BODY MASS INDEX: 34.27 KG/M2 | WEIGHT: 231.4 LBS | SYSTOLIC BLOOD PRESSURE: 128 MMHG | HEIGHT: 69 IN

## 2018-03-22 DIAGNOSIS — K21.00 GASTROESOPHAGEAL REFLUX DISEASE WITH ESOPHAGITIS: ICD-10-CM

## 2018-03-22 DIAGNOSIS — K22.70 BARRETT'S ESOPHAGUS WITHOUT DYSPLASIA: Primary | ICD-10-CM

## 2018-03-22 PROCEDURE — 99213 OFFICE O/P EST LOW 20 MIN: CPT | Performed by: INTERNAL MEDICINE

## 2018-03-22 RX ORDER — AMITRIPTYLINE HYDROCHLORIDE 25 MG/1
100 TABLET, FILM COATED ORAL NIGHTLY
Qty: 120 TABLET | Refills: 3 | Status: SHIPPED | OUTPATIENT
Start: 2018-03-22 | End: 2018-07-25 | Stop reason: SDUPTHER

## 2018-03-22 NOTE — PROGRESS NOTES
PATIENT INFORMATION  Elvis Vallejo       - 1975    CHIEF COMPLAINT  Chief Complaint   Patient presents with   • Heartburn       HISTORY OF PRESENT ILLNESS  Heartburn      he is here today in follow up and doing better with the increased elavil. No daytime sleepiness. He still has some bloating and gas but able to manage this better.        REVIEW OF SYSTEMS  Review of Systems   All other systems reviewed and are negative.        ACTIVE PROBLEMS  Patient Active Problem List    Diagnosis   • Guillermo's esophagus without dysplasia [K22.70]     Overview Note:     Added automatically from request for surgery [id number]     • Gastroesophageal reflux disease with esophagitis [K21.0]   • Small intestinal bacterial overgrowth [K63.89]   • Positive lactulose breath hydrogen test [R88.8]   • Right upper quadrant abdominal pain [R10.11]   • Nausea & vomiting [R11.2]   • Bloating [R14.0]   • Hypertension [I10]   • Epididymitis [N45.1]         PAST MEDICAL HISTORY  Past Medical History:   Diagnosis Date   • Guillermo esophagus    • Guillermo's esophagus    • GERD (gastroesophageal reflux disease)    • Hyperlipidemia    • Hypertension          SURGICAL HISTORY  Past Surgical History:   Procedure Laterality Date   • COLONOSCOPY N/A 3/14/2017    Procedure: COLONOSCOPY with polypectomy ;  Surgeon: Tricia Addison MD;  Location: [facility] OR;  Service:    • ENDOSCOPY N/A 10/27/2016    Procedure: ESOPHAGOGASTRODUODENOSCOPY WITH BILE COLLECTION / RANDOM GASTRIC BIOPSIES,DUODENAL AND DISTAL ESOPHAGEAL BIOPSIES  AND ALFREDO TEST BIOPSY;  Surgeon: Tricia Addison MD;  Location: [facility] OR;  Service:    • ENDOSCOPY N/A 2017    Procedure: ESOPHAGOGASTRODUODENOSCOPY with biopsies, stomach biopsy for  ALFREDO test;  Surgeon: Tricia Addison MD;  Location: [facility] OR;  Service:    • WISDOM TOOTH EXTRACTION           FAMILY HISTORY  Family History   Problem Relation Age of Onset   • Heart attack Paternal Grandmother    • Hypertension Paternal  "Grandfather    • Heart attack Paternal Grandfather    • Colon cancer Neg Hx    • Colon polyps Neg Hx          SOCIAL HISTORY  Social History     Occupational History   • Not on file.     Social History Main Topics   • Smoking status: Light Tobacco Smoker     Years: 10.00     Types: Cigarettes, Cigars   • Smokeless tobacco: Never Used      Comment: monthly   • Alcohol use 1.8 - 2.4 oz/week     3 - 4 Cans of beer per week      Comment: 3-4 times a week   • Drug use: No   • Sexual activity: Defer         CURRENT MEDICATIONS    Current Outpatient Prescriptions:   •  amitriptyline (ELAVIL) 25 MG tablet, Take 4 tablets by mouth Every Night., Disp: 120 tablet, Rfl: 3  •  atorvastatin (LIPITOR) 20 MG tablet, Take 20 mg by mouth Daily., Disp: , Rfl:   •  clidinium-chlordiazePOXIDE (LIBRAX) 5-2.5 MG per capsule, Take 1 capsule by mouth 3 (Three) Times a Day With Meals., Disp: 90 capsule, Rfl: 3  •  clotrimazole-betamethasone (LOTRISONE) 1-0.05 % cream, Apply  topically Every 12 (Twelve) Hours., Disp: 15 g, Rfl: 0  •  lisinopril-hydrochlorothiazide (PRINZIDE,ZESTORETIC) 20-12.5 MG per tablet, Take 1 tablet by mouth Daily., Disp: , Rfl:   •  metroNIDAZOLE (FLAGYL) 500 MG tablet, Take 4 pills times one, Disp: 4 tablet, Rfl: 0    ALLERGIES  Review of patient's allergies indicates no known allergies.    VITALS  Vitals:    03/22/18 0936   BP: 128/78   Weight: 105 kg (231 lb 6.4 oz)   Height: 175.3 cm (69.02\")       LAST RESULTS   Orders Only on 11/08/2017   Component Date Value Ref Range Status   • H. pylori Breath Test 12/06/2017 Negative  Negative Final     No results found.    PHYSICAL EXAM  Physical Exam   Constitutional: He is oriented to person, place, and time. He appears well-developed and well-nourished. No distress.   HENT:   Head: Normocephalic and atraumatic.   Eyes: EOM are normal. Pupils are equal, round, and reactive to light.   Neck: Neck supple. No tracheal deviation present.   Cardiovascular: Normal rate, regular " rhythm, normal heart sounds and intact distal pulses.  Exam reveals no gallop and no friction rub.    No murmur heard.  Pulmonary/Chest: Effort normal and breath sounds normal. No respiratory distress. He has no wheezes. He has no rales. He exhibits no tenderness.   Abdominal: Soft. Bowel sounds are normal. He exhibits no distension. There is no tenderness. There is no rebound and no guarding.   Musculoskeletal: He exhibits no edema.   Lymphadenopathy:     He has no cervical adenopathy.   Neurological: He is alert and oriented to person, place, and time.   Skin: Skin is warm. He is not diaphoretic. No erythema.   Psychiatric: He has a normal mood and affect. His behavior is normal. Judgment and thought content normal.   Nursing note and vitals reviewed.      ASSESSMENT  Diagnoses and all orders for this visit:    Guillermo's esophagus without dysplasia    Gastroesophageal reflux disease with esophagitis    Other orders  -     amitriptyline (ELAVIL) 25 MG tablet; Take 4 tablets by mouth Every Night.          PLAN  Return in about 3 months (around 6/22/2018).      Antireflux measures and dietary modifications reviewed. Low acid diet reviewed. Keep head of bed elevated. Stop eating/drinking at least 3 hours prior to bedtime. Eliminate caffeine and carbonated beverages.  Weight loss encouraged if BMI over 25.      Will increase elavil to 100 mg. He will let me know if this is tolerable. If not can go back down. Start increased dose on weekends when not working.

## 2018-07-27 RX ORDER — AMITRIPTYLINE HYDROCHLORIDE 25 MG/1
100 TABLET, FILM COATED ORAL NIGHTLY
Qty: 120 TABLET | Refills: 2 | Status: SHIPPED | OUTPATIENT
Start: 2018-07-27 | End: 2018-10-23 | Stop reason: SDUPTHER

## 2018-08-17 RX ORDER — PANTOPRAZOLE SODIUM 40 MG/1
TABLET, DELAYED RELEASE ORAL
Qty: 30 TABLET | Refills: 0 | OUTPATIENT
Start: 2018-08-17

## 2018-09-12 RX ORDER — PANTOPRAZOLE SODIUM 40 MG/1
TABLET, DELAYED RELEASE ORAL
Qty: 30 TABLET | Refills: 0 | OUTPATIENT
Start: 2018-09-12

## 2018-09-12 RX ORDER — OMEPRAZOLE 40 MG/1
40 CAPSULE, DELAYED RELEASE ORAL DAILY
Qty: 90 CAPSULE | Refills: 3 | Status: SHIPPED | OUTPATIENT
Start: 2018-09-12 | End: 2019-09-01 | Stop reason: SDUPTHER

## 2018-09-20 ENCOUNTER — OFFICE VISIT (OUTPATIENT)
Dept: GASTROENTEROLOGY | Facility: CLINIC | Age: 43
End: 2018-09-20

## 2018-09-20 VITALS — WEIGHT: 227.4 LBS | HEIGHT: 69 IN | BODY MASS INDEX: 33.68 KG/M2

## 2018-09-20 DIAGNOSIS — K22.70 BARRETT'S ESOPHAGUS WITHOUT DYSPLASIA: ICD-10-CM

## 2018-09-20 DIAGNOSIS — K21.00 GASTROESOPHAGEAL REFLUX DISEASE WITH ESOPHAGITIS: Primary | ICD-10-CM

## 2018-09-20 DIAGNOSIS — K63.89 SMALL INTESTINAL BACTERIAL OVERGROWTH: ICD-10-CM

## 2018-09-20 PROCEDURE — 99213 OFFICE O/P EST LOW 20 MIN: CPT | Performed by: INTERNAL MEDICINE

## 2018-09-20 NOTE — PROGRESS NOTES
PATIENT INFORMATION  Elvis Vallejo       - 1975    CHIEF COMPLAINT  Chief Complaint   Patient presents with   • Heartburn     6 mo follow up       HISTORY OF PRESENT ILLNESS  HPI    He is here today in follow up and is doing well. He was switched from protonix to prilosec. He wants to be back on dexilant because this worked the best. He is tolerating elavil at night. Bloating and abdominal pain is better. Weight has been slightly down. No dysphagia or odynophagia.   He has medications added by pcp which he is not sure of the name. It is for his cholesterol  REVIEW OF SYSTEMS  Review of Systems   All other systems reviewed and are negative.        ACTIVE PROBLEMS  Patient Active Problem List    Diagnosis   • Guillermo's esophagus without dysplasia [K22.70]     Overview Note:     Added automatically from request for surgery [id number]     • Gastroesophageal reflux disease with esophagitis [K21.0]   • Small intestinal bacterial overgrowth [K63.89]   • Positive lactulose breath hydrogen test [R88.8]   • Right upper quadrant abdominal pain [R10.11]   • Nausea & vomiting [R11.2]   • Bloating [R14.0]   • Hypertension [I10]   • Epididymitis [N45.1]         PAST MEDICAL HISTORY  Past Medical History:   Diagnosis Date   • Guillermo esophagus    • Guillermo's esophagus    • GERD (gastroesophageal reflux disease)    • Hyperlipidemia    • Hypertension          SURGICAL HISTORY  Past Surgical History:   Procedure Laterality Date   • COLONOSCOPY N/A 3/14/2017    Procedure: COLONOSCOPY with polypectomy ;  Surgeon: Tricia Addison MD;  Location: [facility] OR;  Service:    • ENDOSCOPY N/A 10/27/2016    Procedure: ESOPHAGOGASTRODUODENOSCOPY WITH BILE COLLECTION / RANDOM GASTRIC BIOPSIES,DUODENAL AND DISTAL ESOPHAGEAL BIOPSIES  AND ALFREDO TEST BIOPSY;  Surgeon: Tricia Addison MD;  Location: [facility] OR;  Service:    • ENDOSCOPY N/A 2017    Procedure: ESOPHAGOGASTRODUODENOSCOPY with biopsies, stomach biopsy for  ALFREDO test;  Surgeon:  "Tricia Addison MD;  Location: [facility] OR;  Service:    • WISDOM TOOTH EXTRACTION           FAMILY HISTORY  Family History   Problem Relation Age of Onset   • Heart attack Paternal Grandmother    • Hypertension Paternal Grandfather    • Heart attack Paternal Grandfather    • Colon cancer Neg Hx    • Colon polyps Neg Hx          SOCIAL HISTORY  Social History     Occupational History   • Not on file.     Social History Main Topics   • Smoking status: Light Tobacco Smoker     Years: 10.00     Types: Cigarettes, Cigars   • Smokeless tobacco: Never Used      Comment: monthly   • Alcohol use 1.8 - 2.4 oz/week     3 - 4 Cans of beer per week      Comment: 3-4 times a week   • Drug use: No   • Sexual activity: Defer         CURRENT MEDICATIONS    Current Outpatient Prescriptions:   •  amitriptyline (ELAVIL) 25 MG tablet, TAKE 4 TABLETS BY MOUTH EVERY NIGHT, Disp: 120 tablet, Rfl: 2  •  atorvastatin (LIPITOR) 20 MG tablet, Take 20 mg by mouth Daily., Disp: , Rfl:   •  clidinium-chlordiazePOXIDE (LIBRAX) 5-2.5 MG per capsule, Take 1 capsule by mouth 3 (Three) Times a Day With Meals., Disp: 90 capsule, Rfl: 3  •  clotrimazole-betamethasone (LOTRISONE) 1-0.05 % cream, Apply  topically Every 12 (Twelve) Hours., Disp: 15 g, Rfl: 0  •  lisinopril-hydrochlorothiazide (PRINZIDE,ZESTORETIC) 20-12.5 MG per tablet, Take 1 tablet by mouth Daily., Disp: , Rfl:   •  metroNIDAZOLE (FLAGYL) 500 MG tablet, Take 4 pills times one, Disp: 4 tablet, Rfl: 0  •  omeprazole (priLOSEC) 40 MG capsule, Take 1 capsule by mouth Daily., Disp: 90 capsule, Rfl: 3    ALLERGIES  Patient has no known allergies.    VITALS  Vitals:    09/20/18 0928   Weight: 103 kg (227 lb 6.4 oz)   Height: 175.3 cm (69.02\")       LAST RESULTS   Orders Only on 11/08/2017   Component Date Value Ref Range Status   • H. pylori Breath Test 12/01/2017 Negative  Negative Final     No results found.    PHYSICAL EXAM  Physical Exam   Constitutional: He is oriented to person, place, " and time. He appears well-developed and well-nourished. No distress.   HENT:   Head: Normocephalic and atraumatic.   Eyes: Pupils are equal, round, and reactive to light. EOM are normal.   Neck: Neck supple. No tracheal deviation present.   Cardiovascular: Normal rate, regular rhythm, normal heart sounds and intact distal pulses.  Exam reveals no gallop and no friction rub.    No murmur heard.  Pulmonary/Chest: Effort normal and breath sounds normal. No respiratory distress. He has no wheezes. He has no rales. He exhibits no tenderness.   Abdominal: Soft. Bowel sounds are normal. He exhibits no distension. There is no tenderness. There is no rebound and no guarding.   Musculoskeletal: He exhibits no edema.   Lymphadenopathy:     He has no cervical adenopathy.   Neurological: He is alert and oriented to person, place, and time.   Skin: Skin is warm. He is not diaphoretic. No erythema.   Psychiatric: He has a normal mood and affect. His behavior is normal. Judgment and thought content normal.   Nursing note and vitals reviewed.      ASSESSMENT  Diagnoses and all orders for this visit:    Gastroesophageal reflux disease with esophagitis    Guillermo's esophagus without dysplasia    Small intestinal bacterial overgrowth          PLAN  No Follow-up on file.    Continue on ppi and probiotic  Will see if we can push for dexilant again.  Antireflux measures and dietary modifications reviewed. Low acid diet reviewed. Keep head of bed elevated. Stop eating/drinking at least 3 hours prior to bedtime. Eliminate caffeine and carbonated beverages.  Weight loss encouraged if BMI over 25.    Recall egd in 2 years.  Last one was one year ago.

## 2018-10-24 RX ORDER — AMITRIPTYLINE HYDROCHLORIDE 25 MG/1
TABLET, FILM COATED ORAL
Qty: 120 TABLET | Refills: 1 | Status: SHIPPED | OUTPATIENT
Start: 2018-10-24 | End: 2018-12-26 | Stop reason: SDUPTHER

## 2018-11-01 ENCOUNTER — OFFICE VISIT (OUTPATIENT)
Dept: GASTROENTEROLOGY | Facility: CLINIC | Age: 43
End: 2018-11-01

## 2018-11-01 VITALS
HEIGHT: 69 IN | DIASTOLIC BLOOD PRESSURE: 76 MMHG | BODY MASS INDEX: 34.45 KG/M2 | WEIGHT: 232.6 LBS | SYSTOLIC BLOOD PRESSURE: 126 MMHG

## 2018-11-01 DIAGNOSIS — K63.89 SMALL INTESTINAL BACTERIAL OVERGROWTH: ICD-10-CM

## 2018-11-01 DIAGNOSIS — K21.00 GASTROESOPHAGEAL REFLUX DISEASE WITH ESOPHAGITIS: ICD-10-CM

## 2018-11-01 DIAGNOSIS — R10.9 ABDOMINAL CRAMPING: Primary | ICD-10-CM

## 2018-11-01 DIAGNOSIS — K22.70 BARRETT'S ESOPHAGUS WITHOUT DYSPLASIA: ICD-10-CM

## 2018-11-01 PROCEDURE — 99213 OFFICE O/P EST LOW 20 MIN: CPT | Performed by: INTERNAL MEDICINE

## 2018-11-01 NOTE — PROGRESS NOTES
PATIENT INFORMATION  Elvis Vallejo       - 1975    CHIEF COMPLAINT  Chief Complaint   Patient presents with   • Abdominal Pain   • Nausea       HISTORY OF PRESENT ILLNESS  Abdominal Pain   Associated symptoms include nausea.   Nausea   Associated symptoms include abdominal pain and nausea.   he is here today in follow up and is doing well. He could not get back on dexilant. We increased his elavil to 100mg last month and he is doing well with this. No side effects. bm are daily. Weight has been stable or slightly up.          REVIEW OF SYSTEMS  Review of Systems   Gastrointestinal: Positive for abdominal pain and nausea.   All other systems reviewed and are negative.        ACTIVE PROBLEMS  Patient Active Problem List    Diagnosis   • Guillermo's esophagus without dysplasia [K22.70]   • Gastroesophageal reflux disease with esophagitis [K21.0]   • Small intestinal bacterial overgrowth [K63.89]   • Positive lactulose breath hydrogen test [R88.8]   • Right upper quadrant abdominal pain [R10.11]   • Nausea & vomiting [R11.2]   • Bloating [R14.0]   • Hypertension [I10]   • Epididymitis [N45.1]         PAST MEDICAL HISTORY  Past Medical History:   Diagnosis Date   • Guillermo esophagus    • Guillermo's esophagus    • GERD (gastroesophageal reflux disease)    • Hyperlipidemia    • Hypertension          SURGICAL HISTORY  Past Surgical History:   Procedure Laterality Date   • COLONOSCOPY N/A 3/14/2017    Procedure: COLONOSCOPY with polypectomy ;  Surgeon: Tricia Addison MD;  Location: [facility];  Service:    • ENDOSCOPY N/A 10/27/2016    Procedure: ESOPHAGOGASTRODUODENOSCOPY WITH BILE COLLECTION / RANDOM GASTRIC BIOPSIES,DUODENAL AND DISTAL ESOPHAGEAL BIOPSIES  AND ALFREDO TEST BIOPSY;  Surgeon: Tricia Addison MD;  Location: [facility] OR;  Service:    • ENDOSCOPY N/A 2017    Procedure: ESOPHAGOGASTRODUODENOSCOPY with biopsies, stomach biopsy for  ALFREDO test;  Surgeon: Tricia Addison MD;  Location: [facility] OR;   "Service:    • WISDOM TOOTH EXTRACTION           FAMILY HISTORY  Family History   Problem Relation Age of Onset   • Heart attack Paternal Grandmother    • Hypertension Paternal Grandfather    • Heart attack Paternal Grandfather    • Colon cancer Neg Hx    • Colon polyps Neg Hx          SOCIAL HISTORY  Social History     Occupational History   • Not on file.     Social History Main Topics   • Smoking status: Light Tobacco Smoker     Years: 10.00     Types: Cigarettes, Cigars   • Smokeless tobacco: Never Used      Comment: monthly   • Alcohol use 1.8 - 2.4 oz/week     3 - 4 Cans of beer per week      Comment: 3-4 times a week   • Drug use: No   • Sexual activity: Defer       Debilities/Disabilities Identified: None    Emotional Behavior: Appropriate    CURRENT MEDICATIONS    Current Outpatient Prescriptions:   •  amitriptyline (ELAVIL) 25 MG tablet, TAKE FOUR TABLETS BY MOUTH ONCE NIGHTLY, Disp: 120 tablet, Rfl: 1  •  atorvastatin (LIPITOR) 20 MG tablet, Take 20 mg by mouth Daily., Disp: , Rfl:   •  clidinium-chlordiazePOXIDE (LIBRAX) 5-2.5 MG per capsule, Take 1 capsule by mouth 3 (Three) Times a Day With Meals., Disp: 90 capsule, Rfl: 3  •  clotrimazole-betamethasone (LOTRISONE) 1-0.05 % cream, Apply  topically Every 12 (Twelve) Hours., Disp: 15 g, Rfl: 0  •  lisinopril-hydrochlorothiazide (PRINZIDE,ZESTORETIC) 20-12.5 MG per tablet, Take 1 tablet by mouth Daily., Disp: , Rfl:   •  metroNIDAZOLE (FLAGYL) 500 MG tablet, Take 4 pills times one, Disp: 4 tablet, Rfl: 0  •  omeprazole (priLOSEC) 40 MG capsule, Take 1 capsule by mouth Daily., Disp: 90 capsule, Rfl: 3    ALLERGIES  Patient has no known allergies.    VITALS  Vitals:    11/01/18 1026   BP: 126/76   Weight: 106 kg (232 lb 9.6 oz)   Height: 175.3 cm (69.02\")       LAST RESULTS   Orders Only on 11/08/2017   Component Date Value Ref Range Status   • H. pylori Breath Test 12/01/2017 Negative  Negative Final     No results found.    PHYSICAL EXAM  Physical Exam "   Constitutional: He is oriented to person, place, and time. He appears well-developed and well-nourished. No distress.   HENT:   Head: Normocephalic and atraumatic.   Eyes: Pupils are equal, round, and reactive to light. EOM are normal.   Neck: Neck supple. No tracheal deviation present.   Cardiovascular: Normal rate, regular rhythm, normal heart sounds and intact distal pulses.  Exam reveals no gallop and no friction rub.    No murmur heard.  Pulmonary/Chest: Effort normal and breath sounds normal. No respiratory distress. He has no wheezes. He has no rales. He exhibits no tenderness.   Abdominal: Soft. Bowel sounds are normal. He exhibits no distension. There is no tenderness. There is no rebound and no guarding.   Musculoskeletal: He exhibits no edema.   Lymphadenopathy:     He has no cervical adenopathy.   Neurological: He is alert and oriented to person, place, and time.   Skin: Skin is warm. He is not diaphoretic. No erythema.   Psychiatric: He has a normal mood and affect. His behavior is normal. Judgment and thought content normal.   Nursing note and vitals reviewed.      ASSESSMENT  Diagnoses and all orders for this visit:    Abdominal cramping    Small intestinal bacterial overgrowth    Guillermo's esophagus without dysplasia    Gastroesophageal reflux disease with esophagitis          PLAN  No Follow-up on file.      Stay on current dose of elavil.  Low gas diet.  Stress maintenance.      rtc in 6 months.

## 2019-01-02 RX ORDER — AMITRIPTYLINE HYDROCHLORIDE 25 MG/1
TABLET, FILM COATED ORAL
Qty: 120 TABLET | Refills: 3 | Status: SHIPPED | OUTPATIENT
Start: 2019-01-02 | End: 2019-05-01 | Stop reason: SDUPTHER

## 2019-05-09 ENCOUNTER — OFFICE VISIT (OUTPATIENT)
Dept: GASTROENTEROLOGY | Facility: CLINIC | Age: 44
End: 2019-05-09

## 2019-05-09 VITALS
BODY MASS INDEX: 34.98 KG/M2 | DIASTOLIC BLOOD PRESSURE: 74 MMHG | WEIGHT: 236.2 LBS | HEIGHT: 69 IN | SYSTOLIC BLOOD PRESSURE: 124 MMHG

## 2019-05-09 DIAGNOSIS — K63.89 SMALL INTESTINAL BACTERIAL OVERGROWTH: Primary | ICD-10-CM

## 2019-05-09 DIAGNOSIS — K22.70 BARRETT'S ESOPHAGUS WITHOUT DYSPLASIA: ICD-10-CM

## 2019-05-09 DIAGNOSIS — K21.00 GASTROESOPHAGEAL REFLUX DISEASE WITH ESOPHAGITIS: ICD-10-CM

## 2019-05-09 PROCEDURE — 99213 OFFICE O/P EST LOW 20 MIN: CPT | Performed by: INTERNAL MEDICINE

## 2019-05-09 NOTE — PROGRESS NOTES
PATIENT INFORMATION  Elvis Vallejo       - 1975    CHIEF COMPLAINT  Chief Complaint   Patient presents with   • Follow-up     6 mo follow up on abd cramping       HISTORY OF PRESENT ILLNESS  HPI    He is here in follow up for his ibs. He is doing well on elavil and ppi. No dysphagia. gerd controlled on ppi therapy. Gets breakthrough once weekly.  No issues with elavil.   Still having some issues with bloating and cramping after meals. He feels like maybe he needs another round of xifaxan. He is on a probiotic.   Weight up slightly      REVIEW OF SYSTEMS  Review of Systems   Gastrointestinal: Positive for abdominal pain.   All other systems reviewed and are negative.        ACTIVE PROBLEMS  Patient Active Problem List    Diagnosis   • Guillermo's esophagus without dysplasia [K22.70]   • Gastroesophageal reflux disease with esophagitis [K21.0]   • Small intestinal bacterial overgrowth [K63.89]   • Positive lactulose breath hydrogen test [R88.8]   • Right upper quadrant abdominal pain [R10.11]   • Nausea & vomiting [R11.2]   • Bloating [R14.0]   • Hypertension [I10]   • Epididymitis [N45.1]         PAST MEDICAL HISTORY  Past Medical History:   Diagnosis Date   • Guillermo esophagus    • Guillermo's esophagus    • GERD (gastroesophageal reflux disease)    • Hyperlipidemia    • Hypertension          SURGICAL HISTORY  Past Surgical History:   Procedure Laterality Date   • COLONOSCOPY N/A 3/14/2017    Procedure: COLONOSCOPY with polypectomy ;  Surgeon: Tricia Addison MD;  Location: [facility];  Service:    • ENDOSCOPY N/A 10/27/2016    Procedure: ESOPHAGOGASTRODUODENOSCOPY WITH BILE COLLECTION / RANDOM GASTRIC BIOPSIES,DUODENAL AND DISTAL ESOPHAGEAL BIOPSIES  AND ALFREDO TEST BIOPSY;  Surgeon: Tricia Addison MD;  Location: [facility] OR;  Service:    • ENDOSCOPY N/A 2017    Procedure: ESOPHAGOGASTRODUODENOSCOPY with biopsies, stomach biopsy for  ALFREDO test;  Surgeon: Tricia Addison MD;  Location: [facility] OR;   "Service:    • HEMORRHOIDECTOMY     • WISDOM TOOTH EXTRACTION           FAMILY HISTORY  Family History   Problem Relation Age of Onset   • Heart attack Paternal Grandmother    • Hypertension Paternal Grandfather    • Heart attack Paternal Grandfather    • Colon cancer Neg Hx    • Colon polyps Neg Hx          SOCIAL HISTORY  Social History     Occupational History   • Not on file   Tobacco Use   • Smoking status: Light Tobacco Smoker     Years: 10.00     Types: Cigarettes, Cigars   • Smokeless tobacco: Never Used   • Tobacco comment: monthly   Substance and Sexual Activity   • Alcohol use: Yes     Alcohol/week: 1.8 - 2.4 oz     Types: 3 - 4 Cans of beer per week     Comment: 3-4 times a week   • Drug use: No   • Sexual activity: Defer       Debilities/Disabilities Identified: None    Emotional Behavior: Appropriate    CURRENT MEDICATIONS    Current Outpatient Medications:   •  amitriptyline (ELAVIL) 25 MG tablet, TAKE 4 TABLETS BY MOUTH NIGHTLY, Disp: 120 tablet, Rfl: 3  •  omeprazole (priLOSEC) 40 MG capsule, Take 1 capsule by mouth Daily., Disp: 90 capsule, Rfl: 3  •  rifaximin (XIFAXAN) 550 MG tablet, Take 1 tablet by mouth Every 8 (Eight) Hours., Disp: 42 tablet, Rfl: 0    ALLERGIES  Patient has no known allergies.    VITALS  Vitals:    05/09/19 0856   BP: 124/74   Weight: 107 kg (236 lb 3.2 oz)   Height: 175.3 cm (69.02\")       LAST RESULTS   Orders Only on 11/08/2017   Component Date Value Ref Range Status   • H. pylori Breath Test 12/01/2017 Negative  Negative Final     No results found.    PHYSICAL EXAM  Physical Exam   Constitutional: He is oriented to person, place, and time. He appears well-developed and well-nourished. No distress.   HENT:   Head: Normocephalic and atraumatic.   Eyes: EOM are normal. Pupils are equal, round, and reactive to light.   Neck: Neck supple. No tracheal deviation present.   Cardiovascular: Normal rate, regular rhythm, normal heart sounds and intact distal pulses. Exam reveals no " gallop and no friction rub.   No murmur heard.  Pulmonary/Chest: Effort normal and breath sounds normal. No respiratory distress. He has no wheezes. He has no rales. He exhibits no tenderness.   Abdominal: Soft. Bowel sounds are normal. He exhibits no distension. There is no tenderness. There is no rebound and no guarding.   Musculoskeletal: He exhibits no edema.   Lymphadenopathy:     He has no cervical adenopathy.   Neurological: He is alert and oriented to person, place, and time.   Skin: Skin is warm. He is not diaphoretic. No erythema.   Psychiatric: He has a normal mood and affect. His behavior is normal. Judgment and thought content normal.   Nursing note and vitals reviewed.      ASSESSMENT  Diagnoses and all orders for this visit:    Small intestinal bacterial overgrowth    Guillermo's esophagus without dysplasia    Gastroesophageal reflux disease with esophagitis    Other orders  -     rifaximin (XIFAXAN) 550 MG tablet; Take 1 tablet by mouth Every 8 (Eight) Hours.          PLAN  Return in about 6 months (around 11/9/2019).    Last egd was in 2017 and recall in 2020    He did respond to xifaxan previously, so will give another dose        Antireflux measures and dietary modifications reviewed. Low acid diet reviewed. Keep head of bed elevated. Stop eating/drinking at least 3 hours prior to bedtime. Eliminate caffeine and carbonated beverages.  Weight loss encouraged if BMI over 25.

## 2019-05-16 RX ORDER — AMITRIPTYLINE HYDROCHLORIDE 25 MG/1
TABLET, FILM COATED ORAL
Qty: 120 TABLET | Refills: 2 | Status: SHIPPED | OUTPATIENT
Start: 2019-05-16 | End: 2019-08-16 | Stop reason: SDUPTHER

## 2019-05-29 RX ORDER — RIFAXIMIN 550 MG/1
TABLET ORAL
Qty: 42 TABLET | Refills: 0 | Status: SHIPPED | OUTPATIENT
Start: 2019-05-29 | End: 2019-06-09 | Stop reason: SDUPTHER

## 2019-06-10 RX ORDER — RIFAXIMIN 550 MG/1
TABLET ORAL
Qty: 42 TABLET | Refills: 0 | Status: SHIPPED | OUTPATIENT
Start: 2019-06-10 | End: 2022-04-13

## 2019-06-25 RX ORDER — RIFAXIMIN 550 MG/1
TABLET ORAL
Qty: 42 TABLET | Refills: 0 | OUTPATIENT
Start: 2019-06-25

## 2019-08-16 RX ORDER — AMITRIPTYLINE HYDROCHLORIDE 25 MG/1
TABLET, FILM COATED ORAL
Qty: 120 TABLET | Refills: 1 | Status: SHIPPED | OUTPATIENT
Start: 2019-08-16 | End: 2019-10-12 | Stop reason: SDUPTHER

## 2019-09-02 RX ORDER — OMEPRAZOLE 40 MG/1
CAPSULE, DELAYED RELEASE ORAL
Qty: 90 CAPSULE | Refills: 2 | Status: SHIPPED | OUTPATIENT
Start: 2019-09-02 | End: 2019-11-14

## 2019-10-14 RX ORDER — AMITRIPTYLINE HYDROCHLORIDE 25 MG/1
TABLET, FILM COATED ORAL
Qty: 120 TABLET | Refills: 0 | Status: SHIPPED | OUTPATIENT
Start: 2019-10-14 | End: 2019-11-14 | Stop reason: SDUPTHER

## 2019-11-14 ENCOUNTER — OFFICE VISIT (OUTPATIENT)
Dept: GASTROENTEROLOGY | Facility: CLINIC | Age: 44
End: 2019-11-14

## 2019-11-14 VITALS — BODY MASS INDEX: 37.68 KG/M2 | WEIGHT: 254.4 LBS | HEIGHT: 69 IN

## 2019-11-14 DIAGNOSIS — K22.70 BARRETT'S ESOPHAGUS WITHOUT DYSPLASIA: ICD-10-CM

## 2019-11-14 DIAGNOSIS — K21.00 GASTROESOPHAGEAL REFLUX DISEASE WITH ESOPHAGITIS: Primary | ICD-10-CM

## 2019-11-14 DIAGNOSIS — Z86.010 HISTORY OF COLONIC POLYPS: ICD-10-CM

## 2019-11-14 PROBLEM — Z86.0100 HISTORY OF COLONIC POLYPS: Status: ACTIVE | Noted: 2019-11-14

## 2019-11-14 PROCEDURE — 99214 OFFICE O/P EST MOD 30 MIN: CPT | Performed by: INTERNAL MEDICINE

## 2019-11-14 RX ORDER — DEXLANSOPRAZOLE 60 MG/1
60 CAPSULE, DELAYED RELEASE ORAL DAILY
Qty: 30 CAPSULE | Refills: 5 | Status: SHIPPED | OUTPATIENT
Start: 2019-11-14 | End: 2019-12-14

## 2019-11-14 RX ORDER — AMITRIPTYLINE HYDROCHLORIDE 25 MG/1
TABLET, FILM COATED ORAL
Qty: 120 TABLET | Refills: 0 | Status: SHIPPED | OUTPATIENT
Start: 2019-11-14 | End: 2019-12-12 | Stop reason: SDUPTHER

## 2019-11-14 NOTE — PROGRESS NOTES
PATIENT INFORMATION  Elvis Vallejo       - 1975    CHIEF COMPLAINT  Chief Complaint   Patient presents with   • Follow-up     6 mo follow up SIBO       HISTORY OF PRESENT ILLNESS  HPI    He is here in follow up for gerd and sibo. He is currently on omeprazole once daily sometimes twice. .  bm are once daily. Weight has increased some.  No dysphagia or odynophagia.  He is due for egd and colon in 2020  REVIEW OF SYSTEMS  Review of Systems   All other systems reviewed and are negative.        ACTIVE PROBLEMS  Patient Active Problem List    Diagnosis   • Guillermo's esophagus without dysplasia [K22.70]   • Gastroesophageal reflux disease with esophagitis [K21.0]   • Small intestinal bacterial overgrowth [K63.89]   • Positive lactulose breath hydrogen test [R88.8]   • Right upper quadrant abdominal pain [R10.11]   • Nausea & vomiting [R11.2]   • Bloating [R14.0]   • Hypertension [I10]   • Epididymitis [N45.1]         PAST MEDICAL HISTORY  Past Medical History:   Diagnosis Date   • Guillermo esophagus    • Guillermo's esophagus    • GERD (gastroesophageal reflux disease)    • Hyperlipidemia    • Hypertension          SURGICAL HISTORY  Past Surgical History:   Procedure Laterality Date   • COLONOSCOPY N/A 3/14/2017    Procedure: COLONOSCOPY with polypectomy ;  Surgeon: Tricia Addison MD;  Location: [facility] OR;  Service:    • ENDOSCOPY N/A 10/27/2016    Procedure: ESOPHAGOGASTRODUODENOSCOPY WITH BILE COLLECTION / RANDOM GASTRIC BIOPSIES,DUODENAL AND DISTAL ESOPHAGEAL BIOPSIES  AND ALFREDO TEST BIOPSY;  Surgeon: Tricia Addison MD;  Location: [facility] OR;  Service:    • ENDOSCOPY N/A 2017    Procedure: ESOPHAGOGASTRODUODENOSCOPY with biopsies, stomach biopsy for  ALFREDO test;  Surgeon: Tricia Addison MD;  Location: [facility] OR;  Service:    • HEMORRHOIDECTOMY     • WISDOM TOOTH EXTRACTION           FAMILY HISTORY  Family History   Problem Relation Age of Onset   • Heart attack Paternal Grandmother    •  "Hypertension Paternal Grandfather    • Heart attack Paternal Grandfather    • Colon cancer Neg Hx    • Colon polyps Neg Hx          SOCIAL HISTORY  Social History     Occupational History   • Not on file   Tobacco Use   • Smoking status: Light Tobacco Smoker     Years: 10.00     Types: Cigarettes, Cigars   • Smokeless tobacco: Never Used   • Tobacco comment: monthly   Substance and Sexual Activity   • Alcohol use: Yes     Alcohol/week: 1.8 - 2.4 oz     Types: 3 - 4 Cans of beer per week     Comment: 3-4 times a week   • Drug use: No   • Sexual activity: Defer       Debilities/Disabilities Identified: None    Emotional Behavior: Appropriate    CURRENT MEDICATIONS    Current Outpatient Medications:   •  amitriptyline (ELAVIL) 25 MG tablet, TAKE FOUR TABLETS BY MOUTH ONCE NIGHTLY, Disp: 120 tablet, Rfl: 0  •  omeprazole (priLOSEC) 40 MG capsule, TAKE ONE CAPSULE BY MOUTH DAILY, Disp: 90 capsule, Rfl: 2  •  XIFAXAN 550 MG tablet, TAKE ONE TABLET BY MOUTH EVERY 8 HOURS, Disp: 42 tablet, Rfl: 0    ALLERGIES  Patient has no known allergies.    VITALS  Vitals:    11/14/19 0854   Weight: 115 kg (254 lb 6.4 oz)   Height: 175.3 cm (69.02\")       LAST RESULTS   Orders Only on 11/08/2017   Component Date Value Ref Range Status   • H. pylori Breath Test 12/01/2017 Negative  Negative Final     No results found.    PHYSICAL EXAM  Physical Exam   Constitutional: He is oriented to person, place, and time. He appears well-developed and well-nourished. No distress.   HENT:   Head: Normocephalic and atraumatic.   Eyes: EOM are normal. Pupils are equal, round, and reactive to light.   Neck: Neck supple. No tracheal deviation present.   Cardiovascular: Normal rate, regular rhythm, normal heart sounds and intact distal pulses. Exam reveals no gallop and no friction rub.   No murmur heard.  Pulmonary/Chest: Effort normal and breath sounds normal. No respiratory distress. He has no wheezes. He has no rales. He exhibits no tenderness. "   Abdominal: Soft. Bowel sounds are normal. He exhibits no distension. There is no tenderness. There is no rebound and no guarding.   Musculoskeletal: He exhibits no edema.   Lymphadenopathy:     He has no cervical adenopathy.   Neurological: He is alert and oriented to person, place, and time.   Skin: Skin is warm. He is not diaphoretic. No erythema.   Psychiatric: He has a normal mood and affect. His behavior is normal. Judgment and thought content normal.   Nursing note and vitals reviewed.      ASSESSMENT  Diagnoses and all orders for this visit:    Gastroesophageal reflux disease with esophagitis  -     Case Request; Standing  -     Case Request    Guillermo's esophagus without dysplasia  -     Case Request; Standing  -     Case Request    History of colonic polyps  -     Case Request; Standing  -     Case Request    Other orders  -     Follow Anesthesia Guidelines / Standing Orders; Future  -     Implement Anesthesia orders day of procedure.; Standing  -     Obtain informed consent; Standing          PLAN  No Follow-up on file.    Antireflux measures and dietary modifications reviewed. Low acid diet reviewed. Keep head of bed elevated. Stop eating/drinking at least 3 hours prior to bedtime. Eliminate caffeine and carbonated beverages.  Weight loss encouraged if BMI over 25.      Indications, risks and procedure were discussed with the patient, including but not limited to, bleeding, infection, possibility of perforation and possible polypectomy. All of the patient's questions were answered, and signed informed consent was obtained and placed on the chart.

## 2019-12-13 RX ORDER — AMITRIPTYLINE HYDROCHLORIDE 25 MG/1
TABLET, FILM COATED ORAL
Qty: 120 TABLET | Refills: 0 | Status: SHIPPED | OUTPATIENT
Start: 2019-12-13 | End: 2020-01-23

## 2019-12-28 ENCOUNTER — HOSPITAL ENCOUNTER (OUTPATIENT)
Dept: GENERAL RADIOLOGY | Facility: HOSPITAL | Age: 44
Discharge: HOME | End: 2019-12-28
Admitting: NURSE PRACTITIONER

## 2019-12-28 DIAGNOSIS — R05.9 COUGH: ICD-10-CM

## 2019-12-28 PROCEDURE — 71046 X-RAY EXAM CHEST 2 VIEWS: CPT

## 2020-01-23 RX ORDER — AMITRIPTYLINE HYDROCHLORIDE 25 MG/1
TABLET, FILM COATED ORAL
Qty: 120 TABLET | Refills: 3 | Status: SHIPPED | OUTPATIENT
Start: 2020-01-23 | End: 2020-05-26 | Stop reason: SDUPTHER

## 2020-02-21 ENCOUNTER — TELEPHONE (OUTPATIENT)
Dept: GASTROENTEROLOGY | Facility: CLINIC | Age: 45
End: 2020-02-21

## 2020-02-21 NOTE — TELEPHONE ENCOUNTER
Patient states Plenvu is going to cost him over $140, wants to know if something else can be sent in

## 2020-02-28 ENCOUNTER — ANESTHESIA EVENT (OUTPATIENT)
Dept: PERIOP | Facility: HOSPITAL | Age: 45
End: 2020-02-28

## 2020-03-02 ENCOUNTER — ANESTHESIA (OUTPATIENT)
Dept: PERIOP | Facility: HOSPITAL | Age: 45
End: 2020-03-02

## 2020-03-02 ENCOUNTER — HOSPITAL ENCOUNTER (OUTPATIENT)
Facility: HOSPITAL | Age: 45
Setting detail: HOSPITAL OUTPATIENT SURGERY
Discharge: HOME | End: 2020-03-02
Attending: INTERNAL MEDICINE | Admitting: INTERNAL MEDICINE

## 2020-03-02 VITALS
OXYGEN SATURATION: 96 % | HEART RATE: 76 BPM | DIASTOLIC BLOOD PRESSURE: 79 MMHG | BODY MASS INDEX: 34.24 KG/M2 | RESPIRATION RATE: 14 BRPM | WEIGHT: 232 LBS | SYSTOLIC BLOOD PRESSURE: 133 MMHG | TEMPERATURE: 98 F

## 2020-03-02 DIAGNOSIS — Z86.010 HISTORY OF COLONIC POLYPS: ICD-10-CM

## 2020-03-02 DIAGNOSIS — K22.70 BARRETT'S ESOPHAGUS WITHOUT DYSPLASIA: ICD-10-CM

## 2020-03-02 DIAGNOSIS — K21.00 GASTROESOPHAGEAL REFLUX DISEASE WITH ESOPHAGITIS: ICD-10-CM

## 2020-03-02 PROCEDURE — 25010000002 PROPOFOL 10 MG/ML EMULSION: Performed by: NURSE ANESTHETIST, CERTIFIED REGISTERED

## 2020-03-02 PROCEDURE — 88305 TISSUE EXAM BY PATHOLOGIST: CPT | Performed by: INTERNAL MEDICINE

## 2020-03-02 PROCEDURE — 43239 EGD BIOPSY SINGLE/MULTIPLE: CPT | Performed by: INTERNAL MEDICINE

## 2020-03-02 PROCEDURE — 45380 COLONOSCOPY AND BIOPSY: CPT | Performed by: INTERNAL MEDICINE

## 2020-03-02 RX ORDER — LIDOCAINE HYDROCHLORIDE 10 MG/ML
0.5 INJECTION, SOLUTION EPIDURAL; INFILTRATION; INTRACAUDAL; PERINEURAL ONCE AS NEEDED
Status: COMPLETED | OUTPATIENT
Start: 2020-03-02 | End: 2020-03-02

## 2020-03-02 RX ORDER — ATORVASTATIN CALCIUM 20 MG/1
20 TABLET, FILM COATED ORAL EVERY MORNING
COMMUNITY

## 2020-03-02 RX ORDER — LISINOPRIL 20 MG/1
20 TABLET ORAL DAILY
Status: ON HOLD | COMMUNITY
End: 2020-03-02

## 2020-03-02 RX ORDER — LORATADINE 10 MG/1
10 TABLET ORAL EVERY MORNING
COMMUNITY

## 2020-03-02 RX ORDER — SODIUM CHLORIDE 0.9 % (FLUSH) 0.9 %
10 SYRINGE (ML) INJECTION AS NEEDED
Status: DISCONTINUED | OUTPATIENT
Start: 2020-03-02 | End: 2020-03-02 | Stop reason: HOSPADM

## 2020-03-02 RX ORDER — GLYCOPYRROLATE 0.2 MG/ML
INJECTION INTRAMUSCULAR; INTRAVENOUS AS NEEDED
Status: DISCONTINUED | OUTPATIENT
Start: 2020-03-02 | End: 2020-03-02 | Stop reason: SURG

## 2020-03-02 RX ORDER — SODIUM CHLORIDE 9 MG/ML
40 INJECTION, SOLUTION INTRAVENOUS AS NEEDED
Status: DISCONTINUED | OUTPATIENT
Start: 2020-03-02 | End: 2020-03-02 | Stop reason: HOSPADM

## 2020-03-02 RX ORDER — LIDOCAINE HYDROCHLORIDE 20 MG/ML
INJECTION, SOLUTION INFILTRATION; PERINEURAL AS NEEDED
Status: DISCONTINUED | OUTPATIENT
Start: 2020-03-02 | End: 2020-03-02 | Stop reason: SURG

## 2020-03-02 RX ORDER — MONTELUKAST SODIUM 10 MG/1
10 TABLET ORAL EVERY MORNING
COMMUNITY

## 2020-03-02 RX ORDER — SODIUM CHLORIDE, SODIUM LACTATE, POTASSIUM CHLORIDE, CALCIUM CHLORIDE 600; 310; 30; 20 MG/100ML; MG/100ML; MG/100ML; MG/100ML
9 INJECTION, SOLUTION INTRAVENOUS CONTINUOUS
Status: DISCONTINUED | OUTPATIENT
Start: 2020-03-02 | End: 2020-03-02 | Stop reason: HOSPADM

## 2020-03-02 RX ORDER — SODIUM CHLORIDE 0.9 % (FLUSH) 0.9 %
10 SYRINGE (ML) INJECTION EVERY 12 HOURS SCHEDULED
Status: DISCONTINUED | OUTPATIENT
Start: 2020-03-02 | End: 2020-03-02 | Stop reason: HOSPADM

## 2020-03-02 RX ORDER — LISINOPRIL AND HYDROCHLOROTHIAZIDE 20; 12.5 MG/1; MG/1
1 TABLET ORAL EVERY MORNING
COMMUNITY

## 2020-03-02 RX ORDER — PROPOFOL 10 MG/ML
VIAL (ML) INTRAVENOUS CONTINUOUS PRN
Status: DISCONTINUED | OUTPATIENT
Start: 2020-03-02 | End: 2020-03-02 | Stop reason: SURG

## 2020-03-02 RX ORDER — PROPOFOL 10 MG/ML
VIAL (ML) INTRAVENOUS AS NEEDED
Status: DISCONTINUED | OUTPATIENT
Start: 2020-03-02 | End: 2020-03-02 | Stop reason: SURG

## 2020-03-02 RX ADMIN — LIDOCAINE HYDROCHLORIDE 0.5 ML: 10 INJECTION, SOLUTION EPIDURAL; INFILTRATION; INTRACAUDAL; PERINEURAL at 08:00

## 2020-03-02 RX ADMIN — SODIUM CHLORIDE, POTASSIUM CHLORIDE, SODIUM LACTATE AND CALCIUM CHLORIDE 9 ML/HR: 600; 310; 30; 20 INJECTION, SOLUTION INTRAVENOUS at 08:00

## 2020-03-02 RX ADMIN — GLYCOPYRROLATE 0.1 MG: 0.2 INJECTION INTRAMUSCULAR; INTRAVENOUS at 08:41

## 2020-03-02 RX ADMIN — PROPOFOL 50 MG: 10 INJECTION, EMULSION INTRAVENOUS at 08:44

## 2020-03-02 RX ADMIN — PROPOFOL 50 MG: 10 INJECTION, EMULSION INTRAVENOUS at 08:51

## 2020-03-02 RX ADMIN — PROPOFOL 50 MG: 10 INJECTION, EMULSION INTRAVENOUS at 08:55

## 2020-03-02 RX ADMIN — PROPOFOL 50 MG: 10 INJECTION, EMULSION INTRAVENOUS at 08:48

## 2020-03-02 RX ADMIN — PROPOFOL 50 MG: 10 INJECTION, EMULSION INTRAVENOUS at 09:12

## 2020-03-02 RX ADMIN — LIDOCAINE HYDROCHLORIDE 50 MG: 20 INJECTION, SOLUTION INFILTRATION; PERINEURAL at 08:41

## 2020-03-02 RX ADMIN — PROPOFOL 50 MG: 10 INJECTION, EMULSION INTRAVENOUS at 09:00

## 2020-03-02 RX ADMIN — PROPOFOL 50 MG: 10 INJECTION, EMULSION INTRAVENOUS at 09:03

## 2020-03-02 RX ADMIN — PROPOFOL 50 MG: 10 INJECTION, EMULSION INTRAVENOUS at 09:08

## 2020-03-02 RX ADMIN — PROPOFOL 50 MG: 10 INJECTION, EMULSION INTRAVENOUS at 08:42

## 2020-03-02 RX ADMIN — PROPOFOL 100 MCG/KG/MIN: 10 INJECTION, EMULSION INTRAVENOUS at 08:42

## 2020-03-02 RX ADMIN — PROPOFOL 50 MG: 10 INJECTION, EMULSION INTRAVENOUS at 08:57

## 2020-03-02 RX ADMIN — SODIUM CHLORIDE, POTASSIUM CHLORIDE, SODIUM LACTATE AND CALCIUM CHLORIDE: 600; 310; 30; 20 INJECTION, SOLUTION INTRAVENOUS at 08:36

## 2020-03-02 NOTE — ANESTHESIA POSTPROCEDURE EVALUATION
Patient: Elvis Vallejo    Procedure Summary     Date:  03/02/20 Room / Location:  [facility] ENDOSCOPY 2 /  [facility] OR    Anesthesia Start:  0836 Anesthesia Stop:  0920    Procedures:       ESOPHAGOGASTRODUODENOSCOPY with biopsies (N/A Esophagus)      COLONOSCOPY with polypectomy (N/A ) Diagnosis:       Gastroesophageal reflux disease with esophagitis      Guillermo's esophagus without dysplasia      History of colonic polyps      (Gastroesophageal reflux disease with esophagitis [K21.0])      (Guillermo's esophagus without dysplasia [K22.70])      (History of colonic polyps [Z86.010])    Surgeon:  Gloria Hoffman MD Provider:  Terence Hernandez CRNA    Anesthesia Type:  MAC ASA Status:  2          Anesthesia Type: MAC    Vitals  Vitals Value Taken Time   /100 3/2/2020  9:22 AM   Temp 98 °F (36.7 °C) 3/2/2020  9:22 AM   Pulse 77 3/2/2020  9:22 AM   Resp 14 3/2/2020  9:22 AM   SpO2 97 % 3/2/2020  9:22 AM           Post Anesthesia Care and Evaluation    Patient location during evaluation: PHASE II  Patient participation: complete - patient participated  Level of consciousness: awake  Pain management: adequate  Airway patency: patent  Anesthetic complications: No anesthetic complications  PONV Status: none  Cardiovascular status: acceptable  Respiratory status: acceptable  Hydration status: acceptable

## 2020-03-02 NOTE — ANESTHESIA PREPROCEDURE EVALUATION
Anesthesia Evaluation     Patient summary reviewed and Nursing notes reviewed   no history of anesthetic complications:  NPO Solid Status: > 8 hours  NPO Liquid Status: > 8 hours           Airway   Mallampati: II  TM distance: >3 FB  Neck ROM: full  No difficulty expected  Dental      Pulmonary - negative pulmonary ROS    breath sounds clear to auscultation    ROS comment: Study Result     TWO-VIEW CHEST     HISTORY: Cough.     FINDINGS: The lungs are well-expanded and clear and the heart and hilar  structures are normal. There is no acute disease.     This report was finalized on 12/28/2019 11:31 AM by Dr. Jaime Calero M.D.      Cardiovascular   Exercise tolerance: good (4-7 METS)    ECG reviewed  Rhythm: regular  Rate: normal    (+) hypertension well controlled less than 2 medications, hyperlipidemia,     ROS comment: Narrative & Impression     RR Interval= 714 ms  CA Interval= 156 ms  QRSD Interval= 84 ms  QT Interval= 372 ms  QTc Interval= 440 ms  Heart Rate= 84 ms  P Axis= 31 deg  QRS Axis= -22 deg  T Wave Axis= 4 deg  I: 40 Axis= -17 deg  T: 40 Axis= -38 deg  ST Axis= 0 deg  SINUS RHYTHM  LEFT AXIS DEVIATION  NO SIGNIFICANT CHANGE FROM PREVIOUS ECG  Electronically Signed by:  Vinay Harrington MD 27-Oct-2016 09:28:54  Date and Time of Study: 2016-10-27 09:09:10        Neuro/Psych- negative ROS  GI/Hepatic/Renal/Endo    (+)  GERD well controlled,      ROS Comment: Guillermo's esophagus    Musculoskeletal     Abdominal    Substance History      OB/GYN          Other        ROS/Med Hx Other: Epididymitis                  Anesthesia Plan    ASA 2     MAC     intravenous induction     Anesthetic plan, all risks, benefits, and alternatives have been provided, discussed and informed consent has been obtained with: patient.  Use of blood products discussed with patient  Consented to blood products.

## 2020-03-02 NOTE — DISCHARGE INSTRUCTIONS
Upper Endoscopy, Adult, Care After  This sheet gives you information about how to care for yourself after your procedure. Your health care provider may also give you more specific instructions. If you have problems or questions, contact your health care provider.  What can I expect after the procedure?  After the procedure, it is common to have:  · A sore throat.  · Mild stomach pain or discomfort.  · Bloating.  · Nausea.  Follow these instructions at home:       · Follow instructions from your health care provider about what to eat or drink after your procedure.  · Return to your normal activities as told by your health care provider. Ask your health care provider what activities are safe for you.  · Take over-the-counter and prescription medicines only as told by your health care provider.  · DO NOT DRIVE FOR THE REST OF TODAY if you were given a sedative during your procedure.  · Keep all follow-up visits as told by your health care provider. This is important.  Contact a health care provider if you have:  · A sore throat that lasts longer than one day.  · Trouble swallowing.  Get help right away if:  · You vomit blood or your vomit looks like coffee grounds.  · You have:  ? A fever.  ? Bloody, black, or tarry stools.  ? A severe sore throat or you cannot swallow.  ? Difficulty breathing.  ? Severe pain in your chest or abdomen.  Summary  · After the procedure, it is common to have a sore throat, mild stomach discomfort, bloating, and nausea.  · Do not drive TODAY if you were given a sedative during the procedure.  · Follow instructions from your health care provider about what to eat or drink after your procedure.  · Return to your normal activities as told by your health care provider.  This information is not intended to replace advice given to you by your health care provider. Make sure you discuss any questions you have with your health care provider.  Document Released: 06/18/2013 Document Revised:  05/20/2019 Document Reviewed: 05/20/2019  [employer] Interactive Patient Education © 2019 [employer]   Colonoscopy, Adult, Care After  This sheet gives you information about how to care for yourself after your procedure. Your doctor may also give you more specific instructions. If you have problems or questions, call your doctor.  What can I expect after the procedure?  After the procedure, it is common to have:  · A small amount of blood in your poop for 24 hours.  · Some gas.  · Mild cramping or bloating in your belly.  Follow these instructions at home:  General instructions    ***************DO NOT DRIVE TODAY*******************    · For the first 24 hours after the procedure:  ? Do not sign important documents.  ? Do not drink alcohol.  ? Do your daily activities more slowly than normal.  ? Eat foods that are soft and easy to digest.  · Take over-the-counter or prescription medicines only as told by your doctor.  To help cramping and bloating:       · Try walking around.  · Put heat on your belly (abdomen) as told by your doctor. Use a heat source that your doctor recommends, such as a moist heat pack or a heating pad.  ? Put a towel between your skin and the heat source.  ? Leave the heat on for 20-30 minutes.  ? Remove the heat if your skin turns bright red. This is especially important if you cannot feel pain, heat, or cold. You can get burned.  Eating and drinking  Monitored Anesthesia Care, Care After  These instructions provide you with information about caring for yourself after your procedure. Your health care provider may also give you more specific instructions. Your treatment has been planned according to current medical practices, but problems sometimes occur. Call your health care provider if you have any problems or questions after your procedure.  What can I expect after the procedure?  After your procedure, you may:  · Feel sleepy for several hours.  · Feel clumsy and have poor balance for  several hours.  · Feel forgetful about what happened after the procedure.  · Have poor judgment for several hours.  · Feel nauseous or vomit.  · Have a sore throat if you had a breathing tube during the procedure.  Follow these instructions at home:    *****************NO DRIVING TODAY****************************    For at least 24 hours after the procedure:             · Have a responsible adult stay with you. It is important to have someone help care for you until you are awake and alert.  · Rest as needed.  · Do not:  ? Participate in activities in which you could fall or become injured.  ? Use heavy machinery.  ? Drink alcohol.  ? Take sleeping pills or medicines that cause drowsiness.  ? Make important decisions or sign legal documents.  ? Take care of children on your own.  Eating and drinking  · Follow the diet that is recommended by your health care provider.  · If you vomit, drink water, juice, or soup when you can drink without vomiting.  · Make sure you have little or no nausea before eating solid foods.  General instructions  · Take over-the-counter and prescription medicines only as told by your health care provider.  · If you have sleep apnea, surgery and certain medicines can increase your risk for breathing problems. Follow instructions from your health care provider about wearing your sleep device:  ? Anytime you are sleeping, including during daytime naps.  ? While taking prescription pain medicines, sleeping medicines, or medicines that make you drowsy.  · If you smoke, do not smoke without supervision.  · Keep all follow-up visits as told by your health care provider. This is important.  Contact a health care provider if:  · You keep feeling nauseous or you keep vomiting.  · You feel light-headed.  · You develop a rash.  · You have a fever.  Get help right away if:  · You have trouble breathing.  Summary  · For several hours after your procedure, you may feel sleepy and have poor  judgment.  · Have a responsible adult stay with you for at least 24 hours or until you are awake and alert.  This information is not intended to replace advice given to you by your health care provider. Make sure you discuss any questions you have with your health care provider.  Document Released: 04/09/2017 Document Revised: 08/03/2018 Document Reviewed: 04/09/2017  [employer] Interactive Patient Education © 2019 [employer]

## 2020-03-02 NOTE — OP NOTE
Patient Name:  Elvis Vallejo  YOB: 1975    Date of Procedure:  3/2/2020    Procedure Performed: EGD and colonoscopy  Indications: GERD, Guillermo's esophagus, history of polyps    Pre-op Diagnosis:   Gastroesophageal reflux disease with esophagitis [K21.0]  Guillermo's esophagus without dysplasia [K22.70]  History of colonic polyps [Z86.010]    Post-Op Diagnosis Codes:     * Gastroesophageal reflux disease with esophagitis [K21.0]     * Guillermo's esophagus without dysplasia [K22.70]     * History of colonic polyps [Z86.010]         Staff:  Surgeon(s):  Gloria Hoffman MD         Consent: Procedure EGD and colonoscopy indications, risks and procedure were discussed with the patient, including but not limited to, bleeding, infection, possibility of perforation and possible polypectomy. All of the patient's questions were answered, and signed informed consent was obtained and placed on the chart.      Sedation: Sedation was provided by anesthesia.      Estimated Blood Loss: minimal    Specimens:   ID Type Source Tests Collected by Time   A (Not marked as sent) : gastric biopsy Tissue Stomach TISSUE PATHOLOGY EXAM Gloria Hoffman MD 3/2/2020 0848   B (Not marked as sent) : distal esophagus biopsy Tissue Esophagus, Distal TISSUE PATHOLOGY EXAM Gloria Hoffman MD 3/2/2020 0849   C (Not marked as sent) : ascending colon polyp Polyp Large Intestine, Right / Ascending Colon TISSUE PATHOLOGY EXAM Gloria Hoffman MD 3/2/2020 0901   D (Not marked as sent) : rectal polyps x2 Polyp Large Intestine, Rectum TISSUE PATHOLOGY EXAM Gloria Hoffman MD 3/2/2020 0914         Description of Procedure:   After excellent sedation a flexible endoscope was passed into the oropharynx into the distal esophagus.  There is evidence of short segment Guillermo's esophagus noted biopsies are obtained using forceps.  He does have a small sliding hiatal hernia noted.  Scope was easily traversed into the stomach all the way into the antrum.   Gastritis is noted here biopsies are obtained using forceps.  The scope was retroflexed here straightened and passed into the duodenal bulb all the way to the second portion with ease.  The entire examined small bowel mucosa overall appears normal.  Scope was then slowly withdrawn out the patient with no immediate complications.  He is then turned onto the appropriate position and a digital rectal examination is performed and a flexible colonoscope was inserted into the rectum passed to the cecum.  The cecum was identified by both the ileocecal valve and the appendiceal orifice.  The overall bowel preparation was fair.  Areas where there was solid residual fibrous material which could not be washed and suctioned out completely.  Terminal ileum was entered and this was normal.  Pertinent findings include a 3 mm sessile ascending polyp removed with forceps and a total of 2 rectal polyps removed with forceps.  He has mild diverticulosis noted throughout the colon.  Mostly in the left side.  The scope was carefully withdrawn to the rectum retroflexed were internal hemorrhoids are noted.  The scope was straightened and withdrawn outpatient with no immediate complications and he tolerated the procedure well.     Withdrawal time: 19 minutes    Quality of bowel preparation: Fair    Findings:   Polyp removed using forceps  Diverticulosis  Internal hemorrhoids  Guillermo's esophagus status post biopsy  Hiatal hernia  Gastritis    Complications: None        Gloria Hoffman MD     Date: 3/2/2020  Time: 9:39 AM

## 2020-03-02 NOTE — H&P
PATIENT INFORMATION  Elvis Vallejo         - 1975     CHIEF COMPLAINT       Chief Complaint   Patient presents with   • Follow-up       6 mo follow up SIBO         HISTORY OF PRESENT ILLNESS  HPI     He is here in follow up for gerd and sibo. He is currently on omeprazole once daily sometimes twice. .  bm are once daily. Weight has increased some.  No dysphagia or odynophagia.  He is due for egd and colon in 2020  REVIEW OF SYSTEMS  Review of Systems   All other systems reviewed and are negative.           ACTIVE PROBLEMS      Patient Active Problem List     Diagnosis   • Guillermo's esophagus without dysplasia [K22.70]   • Gastroesophageal reflux disease with esophagitis [K21.0]   • Small intestinal bacterial overgrowth [K63.89]   • Positive lactulose breath hydrogen test [R88.8]   • Right upper quadrant abdominal pain [R10.11]   • Nausea & vomiting [R11.2]   • Bloating [R14.0]   • Hypertension [I10]   • Epididymitis [N45.1]            PAST MEDICAL HISTORY  Medical History        Past Medical History:   Diagnosis Date   • Guillermo esophagus     • Guillermo's esophagus     • GERD (gastroesophageal reflux disease)     • Hyperlipidemia     • Hypertension                 SURGICAL HISTORY  Surgical History         Past Surgical History:   Procedure Laterality Date   • COLONOSCOPY N/A 3/14/2017     Procedure: COLONOSCOPY with polypectomy ;  Surgeon: Tricia Addison MD;  Location: [facility] OR;  Service:    • ENDOSCOPY N/A 10/27/2016     Procedure: ESOPHAGOGASTRODUODENOSCOPY WITH BILE COLLECTION / RANDOM GASTRIC BIOPSIES,DUODENAL AND DISTAL ESOPHAGEAL BIOPSIES  AND ALFREDO TEST BIOPSY;  Surgeon: Tricia Addison MD;  Location: [facility] OR;  Service:    • ENDOSCOPY N/A 2017     Procedure: ESOPHAGOGASTRODUODENOSCOPY with biopsies, stomach biopsy for  ALFREDO test;  Surgeon: Tricia Addison MD;  Location: [facility] OR;  Service:    • HEMORRHOIDECTOMY       • WISDOM TOOTH EXTRACTION                   FAMILY HISTORY         Family History   Problem Relation Age of Onset   • Heart attack Paternal Grandmother     • Hypertension Paternal Grandfather     • Heart attack Paternal Grandfather     • Colon cancer Neg Hx     • Colon polyps Neg Hx              SOCIAL HISTORY  Social History            Occupational History   • Not on file   Tobacco Use   • Smoking status: Light Tobacco Smoker       Years: 10.00       Types: Cigarettes, Cigars   • Smokeless tobacco: Never Used   • Tobacco comment: monthly   Substance and Sexual Activity   • Alcohol use: Yes       Alcohol/week: 1.8 - 2.4 oz       Types: 3 - 4 Cans of beer per week       Comment: 3-4 times a week   • Drug use: No   • Sexual activity: Defer         Debilities/Disabilities Identified: None     Emotional Behavior: Appropriate     CURRENT MEDICATIONS     Current Outpatient Medications:   •  amitriptyline (ELAVIL) 25 MG tablet, TAKE FOUR TABLETS BY MOUTH ONCE NIGHTLY, Disp: 120 tablet, Rfl: 0  •  omeprazole (priLOSEC) 40 MG capsule, TAKE ONE CAPSULE BY MOUTH DAILY, Disp: 90 capsule, Rfl: 2  •  XIFAXAN 550 MG tablet, TAKE ONE TABLET BY MOUTH EVERY 8 HOURS, Disp: 42 tablet, Rfl: 0     ALLERGIES  Patient has no known allergies.     VITALS  /94 (BP Location: Left arm, Patient Position: Lying)   Pulse 77   Temp 97.9 °F (36.6 °C) (Oral)   Resp 16   Wt 105 kg (232 lb)   SpO2 97%   BMI 34.24 kg/m²               LAST RESULTS                   Orders Only on 11/08/2017   Component Date Value Ref Range Status   • H. pylori Breath Test 12/01/2017 Negative  Negative Final      No results found.     PHYSICAL EXAM  Physical Exam   Constitutional: He is oriented to person, place, and time. He appears well-developed and well-nourished. No distress.   HENT:   Head: Normocephalic and atraumatic.   Eyes: EOM are normal. Pupils are equal, round, and reactive to light.   Neck: Neck supple. No tracheal deviation present.   Cardiovascular: Normal rate, regular rhythm, normal heart sounds and intact  distal pulses. Exam reveals no gallop and no friction rub.   No murmur heard.  Pulmonary/Chest: Effort normal and breath sounds normal. No respiratory distress. He has no wheezes. He has no rales. He exhibits no tenderness.   Abdominal: Soft. Bowel sounds are normal. He exhibits no distension. There is no tenderness. There is no rebound and no guarding.   Musculoskeletal: He exhibits no edema.   Lymphadenopathy:     He has no cervical adenopathy.   Neurological: He is alert and oriented to person, place, and time.   Skin: Skin is warm. He is not diaphoretic. No erythema.   Psychiatric: He has a normal mood and affect. His behavior is normal. Judgment and thought content normal.   Nursing note and vitals reviewed.        ASSESSMENT  Diagnoses and all orders for this visit:     Gastroesophageal reflux disease with esophagitis  -     Case Request; Standing  -     Case Request     Guillermo's esophagus without dysplasia  -     Case Request; Standing  -     Case Request     History of colonic polyps  -     Case Request; Standing  -     Case Request     Other orders  -     Follow Anesthesia Guidelines / Standing Orders; Future  -     Implement Anesthesia orders day of procedure.; Standing  -     Obtain informed consent; Standing              PLAN  No Follow-up on file.     Antireflux measures and dietary modifications reviewed. Low acid diet reviewed. Keep head of bed elevated. Stop eating/drinking at least 3 hours prior to bedtime. Eliminate caffeine and carbonated beverages.  Weight loss encouraged if BMI over 25.        Indications, risks and procedure were discussed with the patient, including but not limited to, bleeding, infection, possibility of perforation and possible polypectomy. All of the patient's questions were answered, and signed informed consent was obtained and placed on the chart.

## 2020-03-03 LAB
CYTO UR: NORMAL
LAB AP CASE REPORT: NORMAL
PATH REPORT.FINAL DX SPEC: NORMAL
PATH REPORT.GROSS SPEC: NORMAL

## 2020-03-05 NOTE — PROGRESS NOTES
Gastritis, no h.pylori, barretts esophagus, no dysplasia  Polyps are sessile serrated and hyperplastic.  Repeat egd in 3 years and colon in 5 years  Continue on ppi

## 2020-05-12 RX ORDER — OMEPRAZOLE 40 MG/1
40 CAPSULE, DELAYED RELEASE ORAL DAILY
Qty: 90 CAPSULE | Refills: 3 | Status: SHIPPED | OUTPATIENT
Start: 2020-05-12 | End: 2021-04-26

## 2020-05-26 RX ORDER — AMITRIPTYLINE HYDROCHLORIDE 100 MG/1
100 TABLET, FILM COATED ORAL NIGHTLY
Qty: 90 TABLET | Refills: 3 | Status: SHIPPED | OUTPATIENT
Start: 2020-05-26 | End: 2022-04-13

## 2021-04-06 ENCOUNTER — BULK ORDERING (OUTPATIENT)
Dept: CASE MANAGEMENT | Facility: OTHER | Age: 46
End: 2021-04-06

## 2021-04-06 DIAGNOSIS — Z23 IMMUNIZATION DUE: ICD-10-CM

## 2021-04-26 RX ORDER — OMEPRAZOLE 40 MG/1
CAPSULE, DELAYED RELEASE ORAL
Qty: 90 CAPSULE | Refills: 3 | Status: SHIPPED | OUTPATIENT
Start: 2021-04-26 | End: 2022-04-13

## 2022-01-05 ENCOUNTER — TRANSCRIBE ORDERS (OUTPATIENT)
Dept: ADMINISTRATIVE | Facility: HOSPITAL | Age: 47
End: 2022-01-05

## 2022-01-05 DIAGNOSIS — S46.911A RIGHT SHOULDER STRAIN, INITIAL ENCOUNTER: Primary | ICD-10-CM

## 2022-02-24 ENCOUNTER — OFFICE VISIT (OUTPATIENT)
Dept: ORTHOPEDIC SURGERY | Facility: CLINIC | Age: 47
End: 2022-02-24

## 2022-02-24 VITALS — WEIGHT: 232 LBS | BODY MASS INDEX: 34.36 KG/M2 | HEIGHT: 69 IN | TEMPERATURE: 97.8 F | RESPIRATION RATE: 16 BRPM

## 2022-02-24 DIAGNOSIS — S46.011A TRAUMATIC INCOMPLETE TEAR OF RIGHT ROTATOR CUFF, INITIAL ENCOUNTER: ICD-10-CM

## 2022-02-24 DIAGNOSIS — M25.511 RIGHT SHOULDER PAIN, UNSPECIFIED CHRONICITY: Primary | ICD-10-CM

## 2022-02-24 PROCEDURE — 99204 OFFICE O/P NEW MOD 45 MIN: CPT | Performed by: ORTHOPAEDIC SURGERY

## 2022-02-24 PROCEDURE — 73030 X-RAY EXAM OF SHOULDER: CPT | Performed by: ORTHOPAEDIC SURGERY

## 2022-02-24 RX ORDER — CEFAZOLIN SODIUM 2 G/100ML
2 INJECTION, SOLUTION INTRAVENOUS ONCE
Status: CANCELLED | OUTPATIENT
Start: 2022-04-20 | End: 2022-02-24

## 2022-02-24 NOTE — PROGRESS NOTES
New Shoulder      Patient: Elvis Vallejo        YOB: 1975    Medical Record Number: [id number]        Chief Complaints: Right shoulder pain      History of Present Illness: This is a 46-year-old male who presents complaining of right shoulder pain he had a work injury on 1221 when he fell onto that shoulder.  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   • atorvastatin (LIPITOR) 20 MG tablet Take 20 mg by mouth Daily.   • lisinopril-hydrochlorothiazide (PRINZIDE,ZESTORETIC) 20-12.5 MG per tablet Take 1 tablet by mouth Daily.   • loratadine (CLARITIN) 10 MG tablet Take 10 mg by mouth Daily.   • montelukast (SINGULAIR) 10 MG tablet Take 10 mg by mouth Every Night.   • omeprazole (priLOSEC) 40 MG capsule TAKE ONE CAPSULE BY MOUTH DAILY   • XIFAXAN 550 MG tablet TAKE ONE TABLET BY MOUTH EVERY 8 HOURS     No current facility-administered medications on file prior to visit.     Current Medications:  Scheduled Meds:  Continuous Infusions:No current facility-administered medications for this visit.    PRN Meds:.    Past Medical History:   Diagnosis Date   • Guillermo esophagus    • Guillermo's esophagus    • GERD (gastroesophageal reflux disease)    • Hyperlipidemia    • Hypertension         Past Surgical History:   Procedure Laterality Date   • COLONOSCOPY N/A 3/14/2017    Procedure: COLONOSCOPY with polypectomy ;  Surgeon: Tricia Virk  MD Azael;  Location: [facility] OR;  Service:    • COLONOSCOPY N/A 3/2/2020    Procedure: COLONOSCOPY with polypectomy;  Surgeon: Gloria Hoffman MD;  Location: [facility] OR;  Service: Gastroenterology;  Laterality: N/A;  ascending colon polyp  rectal polyps x2  diverticulosis  hemorrhoids   • ENDOSCOPY N/A 10/27/2016    Procedure: ESOPHAGOGASTRODUODENOSCOPY WITH BILE COLLECTION / RANDOM GASTRIC BIOPSIES,DUODENAL AND DISTAL ESOPHAGEAL BIOPSIES  AND ALFREDO TEST BIOPSY;  Surgeon: Tricia Addison MD;  Location: [facility] OR;  Service:    • ENDOSCOPY N/A 9/26/2017    Procedure: ESOPHAGOGASTRODUODENOSCOPY with biopsies, stomach biopsy for  ALFREDO test;  Surgeon: Tricia Addison MD;  Location: [facility] OR;  Service:    • ENDOSCOPY N/A 3/2/2020    Procedure: ESOPHAGOGASTRODUODENOSCOPY with biopsies;  Surgeon: Gloria Hoffman MD;  Location: [facility] OR;  Service: Gastroenterology;  Laterality: N/A;  gastric biopsy  distal esophagus biopsy  gastritis  hiatal hernia  Guillermo's esophagus   • HEMORRHOIDECTOMY     • WISDOM TOOTH EXTRACTION          Social History     Occupational History   • Not on file   Tobacco Use   • Smoking status: Former Smoker     Years: 10.00   • Smokeless tobacco: Never Used   • Tobacco comment: use to smoke lightly    Vaping Use   • Vaping Use: Never used   Substance and Sexual Activity   • Alcohol use: Not Currently   • Drug use: No   • Sexual activity: Defer      Social History     Social History Narrative   • Not on file        Family History   Problem Relation Age of Onset   • Heart attack Paternal Grandmother    • Hypertension Paternal Grandfather    • Heart attack Paternal Grandfather    • Colon cancer Neg Hx    • Colon polyps Neg Hx              Review of Systems: 14 point review of systems are remarkable for the shoulder pain only the remainder negative per the patient    Review of Systems      Physical Exam: 46 y.o. male  General Appearance:    Alert, cooperative, in no acute distress                  "  Vitals:    02/24/22 0858   Resp: 16   Temp: 97.8 °F (36.6 °C)   Weight: 105 kg (232 lb)   Height: 175.3 cm (69\")      Patient is alert and read ×3 no acute distress appears her above-listed at height weight and age.  Affect is normal respiratory rate is normal unlabored. Heart rate regular rate rhythm, sclera, dentition and hearing are normal for the purpose of this exam.    Ortho Exam  .[email or web address]  Physical exam of the right shoulder reveals no overlying skin changes no lymphedema no lymphadenopathy.  Patient has active flexion 180 with mild symptoms and some substitution abduction is similar external rotation is to 50 and internal rotation to the upper lumbar spine with mild symptoms.  Patient has good rotator cuff strength 4+ over 5 with isometric strength testing with pain.  Patient has a positive impingement and a positive Hernadez sign.  Patient has good cervical range of motion which is full and asymptomatic no radicular symptoms.  Patient has a normal elbow exam.  Good distal pulses are present  Patient has pain with overhead activity and a positive Neer sign and a positive empty can sign , a positive drop arm and a definitive painful arc    Physical Exam: 46 y.o. male  General Appearance:    Alert, cooperative, in no acute distress                      Vitals:    02/24/22 0858   Resp: 16   Temp: 97.8 °F (36.6 °C)   Weight: 105 kg (232 lb)   Height: 175.3 cm (69\")        Head:    Normocephalic, without obvious abnormality, atraumatic   Eyes:            conjunctivae and sclerae normal, no pallor, corneas clear,    Ears:    Ears appear intact with no abnormalities noted   Throat:   No oral lesions, no thrush, oral mucosa moist   Neck:   No adenopathy, supple, trachea midline, no thyromegaly,    Back:     No kyphosis present, no scoliosis present, no skin lesions,      erythema or scars, no tenderness to percussion or                   palpation,   range of motion normal   Lungs:     Clear to " auscultation,respirations regular, even and                  unlabored    Heart:    Regular rhythm and normal rate               Chest Wall:    No abnormalities observed               Pulses:   Pulses palpable and equal bilaterally   Skin:   No bleeding, bruising or rash   Lymph nodes:   No palpable adenopathy   Neurologic:   Appears neurologic intact       Procedures          Radiology:   AP, Scapular Y and Axillary Lateral of the right shoulder were ordered/reviewed to evauate shoulder pain.  I have no comparative films readily available he has some very mild narrowing of his acromioclavicular joint otherwise no acute pathology he also comes with an MRI which shows a high-grade partial rotator cuff tear I have reviewed the films myself reviewed them with the patient  Imaging Results (Most Recent)     Procedure Component Value Units Date/Time    XR Shoulder 2+ View Right [[id number]] Resulted: 02/24/22 0657     Updated: 02/24/22 0855    Impression:      Ordering physician's impression is located in the Encounter Note dated 02/24/22. X-ray performed in the DR room.          Assessment/Plan: Right shoulder pain with high-grade rotator cuff tear he has a few fibers remaining.  For all intents and purposes this will act as a full-thickness tear he wishes to proceed with repair which I think is fine we did discuss in detail the perioperative regiment, the postop restrictions and will go up to at least 6 months his potential return to work on limited duty as well as risk benefits and alternatives The patient voiced understanding of the risks, benefits, and alternative forms of treatment that were discussed and the patient consents to proceed with the above listed surgery.  All risks, benefits and alternatives were discussed.  Risks including to but not exclusive to anesthetic complications, including death, MI, CVA, infection, bleeding DVT, fracture, residual pain and need for future surgery.  He understands these and  agrees to proceed

## 2022-03-16 PROBLEM — S46.011A TRAUMATIC INCOMPLETE TEAR OF RIGHT ROTATOR CUFF: Status: ACTIVE | Noted: 2022-03-16

## 2022-04-13 ENCOUNTER — PRE-ADMISSION TESTING (OUTPATIENT)
Dept: PREADMISSION TESTING | Facility: HOSPITAL | Age: 47
End: 2022-04-13

## 2022-04-13 ENCOUNTER — TELEPHONE (OUTPATIENT)
Dept: ORTHOPEDIC SURGERY | Facility: CLINIC | Age: 47
End: 2022-04-13

## 2022-04-13 VITALS
SYSTOLIC BLOOD PRESSURE: 119 MMHG | WEIGHT: 215 LBS | HEIGHT: 69 IN | HEART RATE: 79 BPM | BODY MASS INDEX: 31.84 KG/M2 | OXYGEN SATURATION: 96 % | RESPIRATION RATE: 20 BRPM | DIASTOLIC BLOOD PRESSURE: 77 MMHG | TEMPERATURE: 97.6 F

## 2022-04-13 LAB
ANION GAP SERPL CALCULATED.3IONS-SCNC: 12.3 MMOL/L (ref 5–15)
BUN SERPL-MCNC: 15 MG/DL (ref 6–20)
BUN/CREAT SERPL: 28.8 (ref 7–25)
CALCIUM SPEC-SCNC: 9.4 MG/DL (ref 8.6–10.5)
CHLORIDE SERPL-SCNC: 102 MMOL/L (ref 98–107)
CO2 SERPL-SCNC: 24.7 MMOL/L (ref 22–29)
CREAT SERPL-MCNC: 0.52 MG/DL (ref 0.76–1.27)
DEPRECATED RDW RBC AUTO: 40.6 FL (ref 37–54)
EGFRCR SERPLBLD CKD-EPI 2021: 127.5 ML/MIN/1.73
ERYTHROCYTE [DISTWIDTH] IN BLOOD BY AUTOMATED COUNT: 12.1 % (ref 12.3–15.4)
GLUCOSE SERPL-MCNC: 112 MG/DL (ref 65–99)
HCT VFR BLD AUTO: 45.7 % (ref 37.5–51)
HGB BLD-MCNC: 15.2 G/DL (ref 13–17.7)
MCH RBC QN AUTO: 30.7 PG (ref 26.6–33)
MCHC RBC AUTO-ENTMCNC: 33.3 G/DL (ref 31.5–35.7)
MCV RBC AUTO: 92.3 FL (ref 79–97)
PLATELET # BLD AUTO: 230 10*3/MM3 (ref 140–450)
PMV BLD AUTO: 9.5 FL (ref 6–12)
POTASSIUM SERPL-SCNC: 3.9 MMOL/L (ref 3.5–5.2)
QT INTERVAL: 391 MS
RBC # BLD AUTO: 4.95 10*6/MM3 (ref 4.14–5.8)
SODIUM SERPL-SCNC: 139 MMOL/L (ref 136–145)
WBC NRBC COR # BLD: 4.42 10*3/MM3 (ref 3.4–10.8)

## 2022-04-13 PROCEDURE — 85027 COMPLETE CBC AUTOMATED: CPT

## 2022-04-13 PROCEDURE — 93005 ELECTROCARDIOGRAM TRACING: CPT

## 2022-04-13 PROCEDURE — 80048 BASIC METABOLIC PNL TOTAL CA: CPT

## 2022-04-13 PROCEDURE — 36415 COLL VENOUS BLD VENIPUNCTURE: CPT

## 2022-04-13 PROCEDURE — 93010 ELECTROCARDIOGRAM REPORT: CPT | Performed by: INTERNAL MEDICINE

## 2022-04-13 RX ORDER — MULTIPLE VITAMINS W/ MINERALS TAB 9MG-400MCG
1 TAB ORAL DAILY
COMMUNITY

## 2022-04-13 NOTE — DISCHARGE INSTRUCTIONS
Take the following medications the morning of surgery:    loratadine    If you are on prescription narcotic pain medication to control your pain you may also take that medication the morning of surgery.    General Instructions:  Do not eat solid food after midnight the night before surgery.  You may drink clear liquids day of surgery but must stop at least one hour before your hospital arrival time.  It is beneficial for you to have a clear drink that contains carbohydrates the day of surgery.  We suggest a 12 to 20 ounce bottle of Gatorade or Powerade for non-diabetic patients or a 12 to 20 ounce bottle of G2 or Powerade Zero for diabetic patients. (Pediatric patients, are not advised to drink a 12 to 20 ounce carbohydrate drink)    Clear liquids are liquids you can see through.  Nothing red in color.     Plain water                               Sports drinks  Sodas                                   Gelatin (Jell-O)  Fruit juices without pulp such as white grape juice and apple juice  Popsicles that contain no fruit or yogurt  Tea or coffee (no cream or milk added)  Gatorade / Powerade  G2 / Powerade Zero    Infants may have breast milk up to four hours before surgery.  Infants drinking formula may drink formula up to six hours before surgery.   Patients who avoid smoking, chewing tobacco and alcohol for 4 weeks prior to surgery have a reduced risk of post-operative complications.  Quit smoking as many days before surgery as you can.  Do not smoke, use chewing tobacco or drink alcohol the day of surgery.   If applicable bring your C-PAP/ BI-PAP machine.  Bring any papers given to you in the doctor’s office.  Wear clean comfortable clothes.  Do not wear contact lenses, false eyelashes or make-up.  Bring a case for your glasses.   Bring crutches or walker if applicable.  Remove all piercings.  Leave jewelry and any other valuables at home.  Hair extensions with metal clips must be removed prior to surgery.  The  Pre-Admission Testing nurse will instruct you to bring medications if unable to obtain an accurate list in Pre-Admission Testing.        If you were given a blood bank ID arm band remember to bring it with you the day of surgery.    Preventing a Surgical Site Infection:  For 2 to 3 days before surgery, avoid shaving with a razor because the razor can irritate skin and make it easier to develop an infection.    Any areas of open skin can increase the risk of a post-operative wound infection by allowing bacteria to enter and travel throughout the body.  Notify your surgeon if you have any skin wounds / rashes even if it is not near the expected surgical site.  The area will need assessed to determine if surgery should be delayed until it is healed.  The night prior to surgery shower using a fresh bar of anti-bacterial soap (such as Dial) and clean washcloth.  Sleep in a clean bed with clean clothing.  Do not allow pets to sleep with you.  Shower on the morning of surgery using a fresh bar of anti-bacterial soap (such as Dial) and clean washcloth.  Dry with a clean towel and dress in clean clothing.  Ask your surgeon if you will be receiving antibiotics prior to surgery.  Make sure you, your family, and all healthcare providers clean their hands with soap and water or an alcohol based hand  before caring for you or your wound.    Day of surgery:4/20/2022   0500  Your arrival time is approximately two hours before your scheduled surgery time.  Upon arrival, a Pre-op nurse and Anesthesiologist will review your health history, obtain vital signs, and answer questions you may have.  The only belongings needed at this time will be a list of your home medications and if applicable your C-PAP/BI-PAP machine.  A Pre-op nurse will start an IV and you may receive medication in preparation for surgery, including something to help you relax.     Please be aware that surgery does come with discomfort.  We want to make  every effort to control your discomfort so please discuss any uncontrolled symptoms with your nurse.   Your doctor will most likely have prescribed pain medications.      If you are going home after surgery you will receive individualized written care instructions before being discharged.  A responsible adult must drive you to and from the hospital on the day of your surgery and stay with you for 24 hours.  Discharge prescriptions can be filled by the hospital pharmacy during regular pharmacy hours.  If you are having surgery late in the day/evening your prescription may be e-prescribed to your pharmacy.  Please verify your pharmacy hours or chose a 24 hour pharmacy to avoid not having access to your prescription because your pharmacy has closed for the day.    If you are staying overnight following surgery, you will be transported to your hospital room following the recovery period.  [facility] has all private rooms.    If you have any questions please call Pre-Admission Testing at [phone].  Deductibles and co-payments are collected on the day of service. Please be prepared to pay the required co-pay, deductible or deposit on the day of service as defined by your plan.    Patient Education for Self-Quarantine Process    Following your COVID testing, we strongly recommend that you wear a mask when you are with other people and practice social distancing.   Limit your activities to only required outings.  Wash your hands with soap and water frequently for at least 20 seconds.   Avoid touching your eyes, nose and mouth with unwashed hands.  Do not share anything - utensils, drinking glasses, food from the same bowl.   Sanitize household surfaces daily. Include all high touch areas (door handles, light switches, phones, countertops, etc.)    Call your surgeon immediately if you experience any of the following symptoms:  Sore Throat  Shortness of Breath or difficulty breathing  Cough  Chills  Body  soreness or muscle pain  Headache  Fever  New loss of taste or smell  Do not arrive for your surgery ill.  Your procedure will need to be rescheduled to another time.  You will need to call your physician before the day of surgery to avoid any unnecessary exposure to hospital staff as well as other patients.

## 2022-04-19 ENCOUNTER — LAB (OUTPATIENT)
Dept: LAB | Facility: HOSPITAL | Age: 47
End: 2022-04-19

## 2022-04-19 DIAGNOSIS — S46.011A TRAUMATIC INCOMPLETE TEAR OF RIGHT ROTATOR CUFF, INITIAL ENCOUNTER: ICD-10-CM

## 2022-04-19 LAB — SARS-COV-2 ORF1AB RESP QL NAA+PROBE: NOT DETECTED

## 2022-04-19 PROCEDURE — U0004 COV-19 TEST NON-CDC HGH THRU: HCPCS

## 2022-04-19 PROCEDURE — C9803 HOPD COVID-19 SPEC COLLECT: HCPCS

## 2022-04-20 ENCOUNTER — ANESTHESIA (OUTPATIENT)
Dept: PERIOP | Facility: HOSPITAL | Age: 47
End: 2022-04-20

## 2022-04-20 ENCOUNTER — HOSPITAL ENCOUNTER (OUTPATIENT)
Facility: HOSPITAL | Age: 47
Setting detail: HOSPITAL OUTPATIENT SURGERY
Discharge: HOME | End: 2022-04-20
Attending: ORTHOPAEDIC SURGERY | Admitting: ORTHOPAEDIC SURGERY

## 2022-04-20 ENCOUNTER — ANESTHESIA EVENT (OUTPATIENT)
Dept: PERIOP | Facility: HOSPITAL | Age: 47
End: 2022-04-20

## 2022-04-20 VITALS
DIASTOLIC BLOOD PRESSURE: 80 MMHG | OXYGEN SATURATION: 94 % | SYSTOLIC BLOOD PRESSURE: 129 MMHG | RESPIRATION RATE: 16 BRPM | TEMPERATURE: 98.2 F | HEART RATE: 79 BPM

## 2022-04-20 DIAGNOSIS — S46.011A TRAUMATIC INCOMPLETE TEAR OF RIGHT ROTATOR CUFF, INITIAL ENCOUNTER: ICD-10-CM

## 2022-04-20 LAB — GLUCOSE BLDC GLUCOMTR-MCNC: 156 MG/DL (ref 70–130)

## 2022-04-20 PROCEDURE — 25010000002 FENTANYL CITRATE (PF) 50 MCG/ML SOLUTION: Performed by: NURSE ANESTHETIST, CERTIFIED REGISTERED

## 2022-04-20 PROCEDURE — 25010000002 KETOROLAC TROMETHAMINE PER 15 MG: Performed by: NURSE ANESTHETIST, CERTIFIED REGISTERED

## 2022-04-20 PROCEDURE — 25010000002 PROPOFOL 10 MG/ML EMULSION: Performed by: NURSE ANESTHETIST, CERTIFIED REGISTERED

## 2022-04-20 PROCEDURE — 25010000002 EPINEPHRINE PER 0.1 MG: Performed by: ORTHOPAEDIC SURGERY

## 2022-04-20 PROCEDURE — C9290 INJ, BUPIVACAINE LIPOSOME: HCPCS | Performed by: ANESTHESIOLOGY

## 2022-04-20 PROCEDURE — 25010000002 CEFAZOLIN IN DEXTROSE 2-4 GM/100ML-% SOLUTION: Performed by: ORTHOPAEDIC SURGERY

## 2022-04-20 PROCEDURE — 23410 REPAIR ROTATOR CUFF ACUTE: CPT | Performed by: ORTHOPAEDIC SURGERY

## 2022-04-20 PROCEDURE — 0 BUPIVACAINE LIPOSOME 1.3 % SUSPENSION: Performed by: ANESTHESIOLOGY

## 2022-04-20 PROCEDURE — C1713 ANCHOR/SCREW BN/BN,TIS/BN: HCPCS | Performed by: ORTHOPAEDIC SURGERY

## 2022-04-20 PROCEDURE — 76942 ECHO GUIDE FOR BIOPSY: CPT | Performed by: ORTHOPAEDIC SURGERY

## 2022-04-20 PROCEDURE — 25010000002 DEXAMETHASONE PER 1 MG: Performed by: NURSE ANESTHETIST, CERTIFIED REGISTERED

## 2022-04-20 PROCEDURE — 25010000002 CEFAZOLIN PER 500 MG: Performed by: ORTHOPAEDIC SURGERY

## 2022-04-20 PROCEDURE — 25010000002 MIDAZOLAM PER 1 MG: Performed by: ANESTHESIOLOGY

## 2022-04-20 PROCEDURE — 25010000002 ONDANSETRON PER 1 MG: Performed by: NURSE ANESTHETIST, CERTIFIED REGISTERED

## 2022-04-20 PROCEDURE — 29824 SHO ARTHRS SRG DSTL CLAVICLC: CPT | Performed by: ORTHOPAEDIC SURGERY

## 2022-04-20 PROCEDURE — C1889 IMPLANT/INSERT DEVICE, NOC: HCPCS | Performed by: ORTHOPAEDIC SURGERY

## 2022-04-20 PROCEDURE — 82962 GLUCOSE BLOOD TEST: CPT

## 2022-04-20 DEVICE — SYS SUT/ANCH BIOCOMP SPEEDBRIDGE SWIVELK 4.75X19.1: Type: IMPLANTABLE DEVICE | Site: SHOULDER | Status: FUNCTIONAL

## 2022-04-20 DEVICE — SUT FW 2/0 38IN 1BLU 1BLK/WHT  AR7201: Type: IMPLANTABLE DEVICE | Site: SHOULDER | Status: FUNCTIONAL

## 2022-04-20 RX ORDER — IBUPROFEN 600 MG/1
600 TABLET ORAL ONCE AS NEEDED
Status: DISCONTINUED | OUTPATIENT
Start: 2022-04-20 | End: 2022-04-20 | Stop reason: HOSPADM

## 2022-04-20 RX ORDER — LABETALOL HYDROCHLORIDE 5 MG/ML
5 INJECTION, SOLUTION INTRAVENOUS
Status: DISCONTINUED | OUTPATIENT
Start: 2022-04-20 | End: 2022-04-20 | Stop reason: HOSPADM

## 2022-04-20 RX ORDER — KETOROLAC TROMETHAMINE 30 MG/ML
INJECTION, SOLUTION INTRAMUSCULAR; INTRAVENOUS AS NEEDED
Status: DISCONTINUED | OUTPATIENT
Start: 2022-04-20 | End: 2022-04-20 | Stop reason: SURG

## 2022-04-20 RX ORDER — LIDOCAINE HYDROCHLORIDE 10 MG/ML
0.5 INJECTION, SOLUTION EPIDURAL; INFILTRATION; INTRACAUDAL; PERINEURAL ONCE AS NEEDED
Status: DISCONTINUED | OUTPATIENT
Start: 2022-04-20 | End: 2022-04-20 | Stop reason: HOSPADM

## 2022-04-20 RX ORDER — CEFAZOLIN SODIUM 2 G/100ML
2 INJECTION, SOLUTION INTRAVENOUS ONCE
Status: COMPLETED | OUTPATIENT
Start: 2022-04-20 | End: 2022-04-20

## 2022-04-20 RX ORDER — ONDANSETRON 2 MG/ML
INJECTION INTRAMUSCULAR; INTRAVENOUS AS NEEDED
Status: DISCONTINUED | OUTPATIENT
Start: 2022-04-20 | End: 2022-04-20 | Stop reason: SURG

## 2022-04-20 RX ORDER — EPHEDRINE SULFATE 50 MG/ML
5 INJECTION, SOLUTION INTRAVENOUS ONCE AS NEEDED
Status: DISCONTINUED | OUTPATIENT
Start: 2022-04-20 | End: 2022-04-20 | Stop reason: HOSPADM

## 2022-04-20 RX ORDER — SODIUM CHLORIDE 0.9 % (FLUSH) 0.9 %
3-10 SYRINGE (ML) INJECTION AS NEEDED
Status: DISCONTINUED | OUTPATIENT
Start: 2022-04-20 | End: 2022-04-20 | Stop reason: HOSPADM

## 2022-04-20 RX ORDER — HYDRALAZINE HYDROCHLORIDE 20 MG/ML
5 INJECTION INTRAMUSCULAR; INTRAVENOUS
Status: DISCONTINUED | OUTPATIENT
Start: 2022-04-20 | End: 2022-04-20 | Stop reason: HOSPADM

## 2022-04-20 RX ORDER — SODIUM CHLORIDE, SODIUM LACTATE, POTASSIUM CHLORIDE, CALCIUM CHLORIDE 600; 310; 30; 20 MG/100ML; MG/100ML; MG/100ML; MG/100ML
9 INJECTION, SOLUTION INTRAVENOUS CONTINUOUS
Status: DISCONTINUED | OUTPATIENT
Start: 2022-04-20 | End: 2022-04-20 | Stop reason: HOSPADM

## 2022-04-20 RX ORDER — WOUND DRESSING ADHESIVE - LIQUID
LIQUID MISCELLANEOUS AS NEEDED
Status: DISCONTINUED | OUTPATIENT
Start: 2022-04-20 | End: 2022-04-20 | Stop reason: HOSPADM

## 2022-04-20 RX ORDER — FENTANYL CITRATE 50 UG/ML
50 INJECTION, SOLUTION INTRAMUSCULAR; INTRAVENOUS
Status: DISCONTINUED | OUTPATIENT
Start: 2022-04-20 | End: 2022-04-20 | Stop reason: HOSPADM

## 2022-04-20 RX ORDER — FAMOTIDINE 10 MG/ML
20 INJECTION, SOLUTION INTRAVENOUS ONCE
Status: COMPLETED | OUTPATIENT
Start: 2022-04-20 | End: 2022-04-20

## 2022-04-20 RX ORDER — BUPIVACAINE HYDROCHLORIDE 5 MG/ML
INJECTION, SOLUTION EPIDURAL; INTRACAUDAL
Status: COMPLETED | OUTPATIENT
Start: 2022-04-20 | End: 2022-04-20

## 2022-04-20 RX ORDER — SODIUM CHLORIDE 0.9 % (FLUSH) 0.9 %
3 SYRINGE (ML) INJECTION EVERY 12 HOURS SCHEDULED
Status: DISCONTINUED | OUTPATIENT
Start: 2022-04-20 | End: 2022-04-20 | Stop reason: HOSPADM

## 2022-04-20 RX ORDER — ONDANSETRON 2 MG/ML
4 INJECTION INTRAMUSCULAR; INTRAVENOUS ONCE AS NEEDED
Status: DISCONTINUED | OUTPATIENT
Start: 2022-04-20 | End: 2022-04-20 | Stop reason: HOSPADM

## 2022-04-20 RX ORDER — LIDOCAINE HYDROCHLORIDE 20 MG/ML
INJECTION, SOLUTION INFILTRATION; PERINEURAL AS NEEDED
Status: DISCONTINUED | OUTPATIENT
Start: 2022-04-20 | End: 2022-04-20 | Stop reason: SURG

## 2022-04-20 RX ORDER — FENTANYL CITRATE 50 UG/ML
INJECTION, SOLUTION INTRAMUSCULAR; INTRAVENOUS AS NEEDED
Status: DISCONTINUED | OUTPATIENT
Start: 2022-04-20 | End: 2022-04-20 | Stop reason: SURG

## 2022-04-20 RX ORDER — PROMETHAZINE HYDROCHLORIDE 25 MG/1
25 SUPPOSITORY RECTAL ONCE AS NEEDED
Status: DISCONTINUED | OUTPATIENT
Start: 2022-04-20 | End: 2022-04-20 | Stop reason: HOSPADM

## 2022-04-20 RX ORDER — OXYCODONE AND ACETAMINOPHEN 7.5; 325 MG/1; MG/1
1 TABLET ORAL EVERY 4 HOURS PRN
Status: DISCONTINUED | OUTPATIENT
Start: 2022-04-20 | End: 2022-04-20 | Stop reason: HOSPADM

## 2022-04-20 RX ORDER — OXYCODONE HYDROCHLORIDE AND ACETAMINOPHEN 5; 325 MG/1; MG/1
1-2 TABLET ORAL EVERY 4 HOURS PRN
Qty: 45 TABLET | Refills: 0 | OUTPATIENT
Start: 2022-04-20 | End: 2022-06-04

## 2022-04-20 RX ORDER — ONDANSETRON 4 MG/1
4 TABLET, FILM COATED ORAL EVERY 8 HOURS PRN
Qty: 20 TABLET | Refills: 0 | OUTPATIENT
Start: 2022-04-20 | End: 2022-06-04

## 2022-04-20 RX ORDER — DIPHENHYDRAMINE HCL 25 MG
25 CAPSULE ORAL
Status: DISCONTINUED | OUTPATIENT
Start: 2022-04-20 | End: 2022-04-20 | Stop reason: HOSPADM

## 2022-04-20 RX ORDER — DEXAMETHASONE SODIUM PHOSPHATE 10 MG/ML
INJECTION INTRAMUSCULAR; INTRAVENOUS AS NEEDED
Status: DISCONTINUED | OUTPATIENT
Start: 2022-04-20 | End: 2022-04-20 | Stop reason: SURG

## 2022-04-20 RX ORDER — MIDAZOLAM HYDROCHLORIDE 1 MG/ML
1 INJECTION INTRAMUSCULAR; INTRAVENOUS
Status: DISCONTINUED | OUTPATIENT
Start: 2022-04-20 | End: 2022-04-20 | Stop reason: HOSPADM

## 2022-04-20 RX ORDER — NALOXONE HCL 0.4 MG/ML
0.2 VIAL (ML) INJECTION AS NEEDED
Status: DISCONTINUED | OUTPATIENT
Start: 2022-04-20 | End: 2022-04-20 | Stop reason: HOSPADM

## 2022-04-20 RX ORDER — FLUMAZENIL 0.1 MG/ML
0.2 INJECTION INTRAVENOUS AS NEEDED
Status: DISCONTINUED | OUTPATIENT
Start: 2022-04-20 | End: 2022-04-20 | Stop reason: HOSPADM

## 2022-04-20 RX ORDER — PROPOFOL 10 MG/ML
VIAL (ML) INTRAVENOUS AS NEEDED
Status: DISCONTINUED | OUTPATIENT
Start: 2022-04-20 | End: 2022-04-20 | Stop reason: SURG

## 2022-04-20 RX ORDER — PROMETHAZINE HYDROCHLORIDE 25 MG/1
25 TABLET ORAL ONCE AS NEEDED
Status: DISCONTINUED | OUTPATIENT
Start: 2022-04-20 | End: 2022-04-20 | Stop reason: HOSPADM

## 2022-04-20 RX ORDER — DIPHENHYDRAMINE HYDROCHLORIDE 50 MG/ML
12.5 INJECTION INTRAMUSCULAR; INTRAVENOUS
Status: DISCONTINUED | OUTPATIENT
Start: 2022-04-20 | End: 2022-04-20 | Stop reason: HOSPADM

## 2022-04-20 RX ORDER — HYDROCODONE BITARTRATE AND ACETAMINOPHEN 7.5; 325 MG/1; MG/1
1 TABLET ORAL ONCE AS NEEDED
Status: DISCONTINUED | OUTPATIENT
Start: 2022-04-20 | End: 2022-04-20 | Stop reason: HOSPADM

## 2022-04-20 RX ADMIN — KETOROLAC TROMETHAMINE 30 MG: 30 INJECTION, SOLUTION INTRAMUSCULAR at 09:09

## 2022-04-20 RX ADMIN — BUPIVACAINE 10 ML: 13.3 INJECTION, SUSPENSION, LIPOSOMAL INFILTRATION at 06:46

## 2022-04-20 RX ADMIN — ONDANSETRON 4 MG: 2 INJECTION INTRAMUSCULAR; INTRAVENOUS at 09:09

## 2022-04-20 RX ADMIN — PROPOFOL 200 MG: 10 INJECTION, EMULSION INTRAVENOUS at 07:37

## 2022-04-20 RX ADMIN — CEFAZOLIN SODIUM 2 G: 2 INJECTION, SOLUTION INTRAVENOUS at 07:26

## 2022-04-20 RX ADMIN — DEXAMETHASONE SODIUM PHOSPHATE 8 MG: 10 INJECTION INTRAMUSCULAR; INTRAVENOUS at 07:44

## 2022-04-20 RX ADMIN — BUPIVACAINE HYDROCHLORIDE 10 ML: 5 INJECTION, SOLUTION EPIDURAL; INTRACAUDAL; PERINEURAL at 06:46

## 2022-04-20 RX ADMIN — MIDAZOLAM 1 MG: 1 INJECTION INTRAMUSCULAR; INTRAVENOUS at 06:40

## 2022-04-20 RX ADMIN — SODIUM CHLORIDE, POTASSIUM CHLORIDE, SODIUM LACTATE AND CALCIUM CHLORIDE: 600; 310; 30; 20 INJECTION, SOLUTION INTRAVENOUS at 08:44

## 2022-04-20 RX ADMIN — FAMOTIDINE 20 MG: 10 INJECTION INTRAVENOUS at 06:35

## 2022-04-20 RX ADMIN — FENTANYL CITRATE 100 MCG: 50 INJECTION INTRAMUSCULAR; INTRAVENOUS at 07:42

## 2022-04-20 RX ADMIN — LIDOCAINE HYDROCHLORIDE 100 MG: 20 INJECTION, SOLUTION INFILTRATION; PERINEURAL at 07:37

## 2022-04-20 RX ADMIN — SODIUM CHLORIDE, POTASSIUM CHLORIDE, SODIUM LACTATE AND CALCIUM CHLORIDE 9 ML/HR: 600; 310; 30; 20 INJECTION, SOLUTION INTRAVENOUS at 06:32

## 2022-04-20 NOTE — ANESTHESIA PROCEDURE NOTES
ISB      Patient reassessed immediately prior to procedure    Patient location during procedure: pre-op  Start time: 4/20/2022 6:40 AM  Stop time: 4/20/2022 6:46 AM  Reason for block: at surgeon's request and post-op pain management  Performed by  Anesthesiologist: Ronna Sarah MD  Preanesthetic Checklist  Completed: patient identified, IV checked, site marked, risks and benefits discussed, surgical consent, monitors and equipment checked, pre-op evaluation and timeout performed  Prep:  Pt Position: sitting  Sterile barriers:cap, gloves and mask  Prep: ChloraPrep  Patient monitoring: blood pressure monitoring, continuous pulse oximetry and EKG  Procedure    Sedation: yes  Performed under: local infiltration  Guidance:ultrasound guided    ULTRASOUND INTERPRETATION.  Using ultrasound guidance a 22 G gauge needle was placed in close proximity to the brachial plexus nerve, at which point, under ultrasound guidance anesthetic was injected in the area of the nerve and spread of the anesthesia was seen on ultrasound in close proximity thereto.  There were no abnormalities seen on ultrasound; a digital image was taken; and the patient tolerated the procedure with no complications. Images:still images obtained, printed/placed on chart    Laterality:right  Block Type:interscalene  Injection Technique:single-shot  Needle Type:short-bevel and echogenic  Needle Gauge:22 G  Resistance on Injection: none    Medications Used: bupivacaine liposome (EXPAREL) 1.3 % injection, 10 mL  bupivacaine (PF) (MARCAINE) 0.5 % injection, 10 mL  Med administered at 4/20/2022 6:46 AM      Medications  Comment:Ultrasound Interpretation:  Using ultrasound guidance the needle was placed in close proximity to the target nerve and anesthetic was injected in the area of the target nerve and/or bundles, and spread of the anesthetic was seen on ultrasound in close proximity thereto.  There were no abnormalities seen on ultrasound; a digital image  was taken; and the patient tolerated the procedure with no complications.    Block placed for postoperative pain control per surgeon request.    Post Assessment  Injection Assessment: negative aspiration for heme, no paresthesia on injection and incremental injection  Patient Tolerance:comfortable throughout block  Complications:no

## 2022-04-20 NOTE — ANESTHESIA PROCEDURE NOTES
Airway  Urgency: elective    Date/Time: 4/20/2022 7:41 AM  Airway not difficult    General Information and Staff    Patient location during procedure: OR  Anesthesiologist: Esteban Mcpherson MD  CRNA: Jamila Cm CRNA    Indications and Patient Condition  Indications for airway management: airway protection    Preoxygenated: yes  MILS not maintained throughout  Mask difficulty assessment: 0 - not attempted    Final Airway Details  Final airway type: supraglottic airway      Successful airway: unique  Size 5    Number of attempts at approach: 1  Assessment: lips, teeth, and gum same as pre-op and atraumatic intubation    Additional Comments  LMA inserted with ease, assist to SV

## 2022-04-20 NOTE — OP NOTE
Rotator Cuff RepairOperative Note      Facility: [facility]  Patient Name: Elvis Vallejo  YOB: 1975  Date: 4/20/2022  Medical Record Number: [id number]      Pre-op Diagnosis:   Traumatic incomplete tear of right rotator cuff, initial encounter [S46.011A]    Post-Op Diagnosis Codes:     * Traumatic incomplete tear of right rotator cuff, initial encounter [S46.011A] impingement, acromioclavicular arthritis    Procedure(s):   right SHOULDER ARTHROSCOPY WITH ROTATOR CUFF REPAIR, decompression, distal clavicle excision     Surgeon(s):  Sabrina Alexander MD    Anesthesia: General with Block  Anesthesiologist: Esteban Mcpherson MD  CRNA: Jamila Cm CRNA    Staff:   Circulator: Wandy Wagner RN  Scrub Person: Ale Ortega  Vendor Representative: Catalina Fang; [email or web address]      [id number])@    Estimated Blood Loss: 10cc right  Specimens:   * No orders in the log *    Drains: None    Findings: See Dictation    Complications: None    Indication for procedure:   This patient has had a several month history of right shoulder pain that has been unresponsive to conservative management.  They have an exam and an MRI which are consistent with rotator cuff pathology and they present for arthroscopy and possible repair.  They understand all risks, benefits, and alternatives.  Risk including but not exclusive to anesthetic complications including death, MI, CVA as well as infection, bleeding, DVT, PE, stiffness, failure to relieve their symptoms and need for future surgery.  They understand this and agree to proceed.    Description of procedure:  Patient was taken to the operating room.  They were placed supine on the  operating room table.  After induction of adequate LMA anesthesia and scalene nerve block and IV antibiotics.  They underwent exam under anesthesia was symmetric full range of motion which was symmetric side to side.  They were then  placed in the modified beachchair position all prominent areas well padded and head well stabilized.  The arm was prepped and draped  in usual sterile fashion, bony landmarks were demarcated and the joint was infiltrated with 30 mL of fluid.  A standard posterior portal was made inferior and medial to the posterior lateral edge of the acromion with an 11 blade.  Blunt trocar penetrated into the joint scope, followed and evaluation began.  An anterior portal was established in the interval between the subscapularis and the biceps tendon with spinal needle localization and direct visualization.  His biceps tendon and subscapularis articular cartilage were normal the rotator cuff showed a partial articular sided cuff tear in the area of the supraspinatus and also the infraspinatus.  Infraspinatus seen more involved in a bigger tear on the MRI however they both appear to be a high-grade partial tears intraoperatively.  I then exited the space, entered subacromial space. I made accessory lateral portal off the anterior lateral edge of the acromion, placed the shaver and removed thickened bursa. I delineated the anterior lateral edge of the acromion with the Opus device and removed a large spur with a bone cutter. There was plenty of room under this joint after this for the rotator cuff.  he was found to have some acromioclavicular arthritis which was debrided.  The lateral 8-10 mm of clavicle resected through the lateral and the previously established anterior portal. The rotator cuff was evaluated and was found to have a s high-grade partial-thickness tear therefore elevated the remaining fibers involving part of the supraspinatus and the infraspinatus.  Prepared the edges of the cuff, prepared the tuberosity with a rongeur I did place a retention stitch with the Scorpion device. I then exited the space, placed the Kobel retractors and readily identified the rotator cuff tear. The tuberosity was prepared with a rongeur.   Two Arthrex SwiveLock anchors preloaded with Fiber Tape were placed in standard fashion with good bony pullout. A Fiber Tape was preloaded and Scorpion device was used to place each of the Fiber Wires one anterior one posterior. I then placed a cinch stitch anterior and posterior and incorporated this cinch stitch and 2 Fiber Wires in each of 2 SwiveLock anchors on the lateral row.  The decompression was adequate. Everything was thoroughly irrigated. The deltoid is reapproximated with 0 Vicryl, subcutaneous tissue with 2-0 Vicryl. The skin was closed with a running 4-0 subcuticular stitch. Sterile dressings and Steri-Strips were applied. The portals were closed with 2-0 Vicryl. Sling was applied. The patient was taken to recovery room in good condition.  All sponge and needle counts were correct.          Date: 4/20/2022  Time: 09:31 ADILSON lazar

## 2022-04-20 NOTE — H&P
History & Physical       Patient: Elvis Vallejo    Date of Admission: 4/20/2022  5:37 AM    YOB: 1975    Medical Record Number: [id number]    Attending Physician: Sabrina Alexander MD        Chief Complaints: Traumatic incomplete tear of right rotator cuff, initial encounter [S46.011A]      History of Present Illness: This patient has several month history of right shoulder pain that is been unresponsive to conservative management.  He shows a partial tear of the infraspinatus he presents for arthroscopy either debridement or repair depending on how it looks.  He understands the ramifications of all.  Risk benefits and alternatives he also understands in the event I find with other pathology at the time of surgery I would pursue treatment of that as deemed appropriate     Allergies:   Allergies   Allergen Reactions   • Peanut Oil Other (See Comments)     Peanut Dust- Eyes swelled, runny nose, sore throat       Medications:   Home Medications:  No current facility-administered medications on file prior to encounter.     Current Outpatient Medications on File Prior to Encounter   Medication Sig   • atorvastatin (LIPITOR) 20 MG tablet Take 20 mg by mouth Every Morning.   • lisinopril-hydrochlorothiazide (PRINZIDE,ZESTORETIC) 20-12.5 MG per tablet Take 1 tablet by mouth Every Morning.   • loratadine (CLARITIN) 10 MG tablet Take 10 mg by mouth Every Morning.   • montelukast (SINGULAIR) 10 MG tablet Take 10 mg by mouth Every Morning.     Current Medications:  Scheduled Meds:ceFAZolin, 2 g, Intravenous, Once  sodium chloride, 3 mL, Intravenous, Q12H      Continuous Infusions:lactated ringers, 9 mL/hr, Last Rate: 9 mL/hr (04/20/22 0632)      PRN Meds:.lidocaine PF 1%  •  midazolam  •  sodium chloride    Past Medical History:   Diagnosis Date   • Anesthesia complication     urinary retention   • Guillermo esophagus    • Guillermo's esophagus    • GERD (gastroesophageal reflux disease)    • Hyperlipidemia    •  Hypertension         Past Surgical History:   Procedure Laterality Date   • COLONOSCOPY N/A 3/14/2017    Procedure: COLONOSCOPY with polypectomy ;  Surgeon: Tricia Addison MD;  Location: [facility] OR;  Service:    • COLONOSCOPY N/A 3/2/2020    Procedure: COLONOSCOPY with polypectomy;  Surgeon: Gloria Hoffman MD;  Location: [facility] OR;  Service: Gastroenterology;  Laterality: N/A;  ascending colon polyp  rectal polyps x2  diverticulosis  hemorrhoids   • ENDOSCOPY N/A 10/27/2016    Procedure: ESOPHAGOGASTRODUODENOSCOPY WITH BILE COLLECTION / RANDOM GASTRIC BIOPSIES,DUODENAL AND DISTAL ESOPHAGEAL BIOPSIES  AND ALFREDO TEST BIOPSY;  Surgeon: Tricia Addison MD;  Location: [facility] OR;  Service:    • ENDOSCOPY N/A 2017    Procedure: ESOPHAGOGASTRODUODENOSCOPY with biopsies, stomach biopsy for  ALFREDO test;  Surgeon: Tricia Addison MD;  Location: [facility] OR;  Service:    • ENDOSCOPY N/A 3/2/2020    Procedure: ESOPHAGOGASTRODUODENOSCOPY with biopsies;  Surgeon: Gloria Hoffman MD;  Location: [facility] OR;  Service: Gastroenterology;  Laterality: N/A;  gastric biopsy  distal esophagus biopsy  gastritis  hiatal hernia  Guillermo's esophagus   • HEMORRHOIDECTOMY     • WISDOM TOOTH EXTRACTION          Social History     Occupational History   • Not on file   Tobacco Use   • Smoking status: Former Smoker     Years: 10.00     Types: Cigars     Quit date:      Years since quittin.3   • Smokeless tobacco: Never Used   • Tobacco comment: only used 1-2 times a year   Vaping Use   • Vaping Use: Never used   Substance and Sexual Activity   • Alcohol use: Yes     Alcohol/week: 3.0 standard drinks     Types: 3 Cans of beer per week   • Drug use: No   • Sexual activity: Defer      Social History     Social History Narrative   • Not on file        Family History   Problem Relation Age of Onset   • Heart attack Paternal Grandmother    • Hypertension Paternal Grandfather    • Heart attack Paternal Grandfather    • Colon cancer Neg Hx     • Colon polyps Neg Hx    • Malig Hyperthermia Neg Hx        Review of Systems      Physical Exam: 46 y.o. male  General Appearance:    Alert, cooperative, in no acute distress                      Vitals:    04/20/22 0602 04/20/22 0645   BP: 112/81 123/85   BP Location: Left arm Left arm   Patient Position: Sitting Lying   Pulse: 80 72   Resp: 16 16   Temp: 97.8 °F (36.6 °C)    TempSrc: Oral    SpO2: 96% 98%        Head:  Normocephalic, without obvious abnormality, atraumatic   Eyes:          Conjunctivae and sclerae normal, no pallor, corneas clear,    Ears:  Ears appear intact with no abnormalities noted   Throat: No oral lesions, no thrush, oral mucosa moist   Neck: No adenopathy, supple, trachea midline, no thyromegaly,    Back:   No kyphosis present, no scoliosis present, no skin lesions,      erythema or scars, no tenderness to percussion or                   palpation,range of motion normal   Lungs:   Clear to auscultation, respirations regular, even and                 unlabored    Heart:  Regular rhythm and normal rate               Chest Wall:  No abnormalities observed   Abdomen:   Normal bowel sounds, no masses, no organomegaly, soft    nontender, nondistended, no guarding, no rebound   tenderness   Rectal:   Deferred   Extremities:  Moves all extremities well, no edema,   no cyanosis, no redness   Pulses: Pulses palpable and equal bilaterally   Skin: No bleeding, bruising or rash   Lymph nodes: No palpable adenopathy   Neurologic: Appears neurologic intact             Assessment:  Patient Active Problem List   Diagnosis   • Right upper quadrant abdominal pain   • Nausea & vomiting   • Bloating   • Epididymitis   • Hypertension   • Positive lactulose breath hydrogen test   • Small intestinal bacterial overgrowth   • Gastroesophageal reflux disease with esophagitis   • Guillermo's esophagus without dysplasia   • History of colonic polyps   • Traumatic incomplete tear of right rotator cuff           Plan:  All risks, benefits and alternatives were discussed.  Risks including but not exclusive to anesthetic complications, including death, MI, CVA, infection, bleeding DVT, PE,  fracture, residual pain and need for future surgery.  Patient understood all and agrees to proceed.

## 2022-04-20 NOTE — ANESTHESIA POSTPROCEDURE EVALUATION
Patient: Elvis Vallejo    Procedure Summary     Date: 04/20/22 Room / [facility]:  [facility]    Anesthesia Start: 0730 Anesthesia Stop: 0929    Procedure: SHOULDER ARTHROSCOPY WITH ROTATOR CUFF REPAIR, decompression, distal clavicle excision  (Right Shoulder) Diagnosis:       Traumatic incomplete tear of right rotator cuff, initial encounter      (Traumatic incomplete tear of right rotator cuff, initial encounter [S46.011A])    Surgeons: Sabrina Alexander MD Provider: Esteban Mcpherson MD    Anesthesia Type: Not recorded ASA Status: 2          Anesthesia Type: No value filed.    Vitals  Vitals Value Taken Time   /73 04/20/22 1001   Temp 36.8 °C (98.2 °F) 04/20/22 0926   Pulse 81 04/20/22 1001   Resp 16 04/20/22 1000   SpO2 94 % 04/20/22 1002   Vitals shown include unvalidated device data.        Post Anesthesia Care and Evaluation    Patient location during evaluation: bedside  Patient participation: complete - patient participated  Level of consciousness: awake  Pain management: adequate  Airway patency: patent  Anesthetic complications: No anesthetic complications  PONV Status: controlled  Cardiovascular status: acceptable  Respiratory status: acceptable  Hydration status: acceptable    Comments: --------------------            04/20/22               1008     --------------------   BP:       129/80     Pulse:      79       Resp:       16       Temp:                SpO2:      94%      --------------------

## 2022-04-20 NOTE — ANESTHESIA PREPROCEDURE EVALUATION
Anesthesia Evaluation     Patient summary reviewed and Nursing notes reviewed   history of anesthetic complications (urinary retention):  NPO Solid Status: > 8 hours  NPO Liquid Status: > 2 hours           Airway   Mallampati: II  TM distance: >3 FB  Neck ROM: full  Possible difficult intubation and Anterior  Dental    (+) implants        Pulmonary - normal exam   (+) a smoker Former,   Cardiovascular - normal exam  Exercise tolerance: excellent (>7 METS)    ECG reviewed    (+) hypertension, hyperlipidemia,   (-) dysrhythmias, angina, orthopnea, PND, TEJEDA      Neuro/Psych  GI/Hepatic/Renal/Endo    (+)  GERD (Zack's),      Musculoskeletal     Abdominal    Substance History      OB/GYN          Other                        Anesthesia Plan    ASA 2   (Avoid opioids when possible  ISB for POPC per surgeon request    Risks of peripheral nerve block were discussed with patient including but not limited to: inadequate block, bleeding, infection, persistent numbness or weakness, nerve damage, painful dysasthesia and need to protect limb while numb.    I have reviewed the patient's history and chart with the patient, including all pertinent laboratory results and imaging. I have explained the risks of anesthesia including but not limited to dental damage, corneal abrasion, nerve injury, MI, stroke, aspiration, and death. Patient has agreed to proceed.     /81 (BP Location: Left arm, Patient Position: Sitting)   Pulse 80   Temp 36.6 °C (97.8 °F) (Oral)   Resp 16   SpO2 96%   )  intravenous induction     Anesthetic plan, all risks, benefits, and alternatives have been provided, discussed and informed consent has been obtained with: patient.        CODE STATUS:

## 2022-04-25 RX ORDER — OMEPRAZOLE 40 MG/1
CAPSULE, DELAYED RELEASE ORAL
Qty: 90 CAPSULE | Refills: 3 | OUTPATIENT
Start: 2022-04-25

## 2022-05-02 NOTE — PROGRESS NOTES
Right Shoulder Scope RCR follow Up 1st Visit      Patient: Elvis Vallejo        YOB: 1975      Chief Complaints: Right shoulder pain      History of Present Illness: Pt is here f/u shoulder arthroscopy rotator cuff repair he states he is doing great he states he is off his pain medicine not having really any issues he is anxious to start into therapy and the  told him driving would be a little bit longer        Allergies:   Allergies   Allergen Reactions   • Peanut Oil Other (See Comments)     Peanut Dust- Eyes swelled, runny nose, sore throat       Medications:   Home Medications:  Current Outpatient Medications on File Prior to Visit   Medication Sig   • atorvastatin (LIPITOR) 20 MG tablet Take 20 mg by mouth Every Morning.   • lisinopril-hydrochlorothiazide (PRINZIDE,ZESTORETIC) 20-12.5 MG per tablet Take 1 tablet by mouth Every Morning.   • loratadine (CLARITIN) 10 MG tablet Take 10 mg by mouth Every Morning.   • montelukast (SINGULAIR) 10 MG tablet Take 10 mg by mouth Every Morning.   • multivitamin with minerals tablet tablet Take 1 tablet by mouth Daily.   • ondansetron (Zofran) 4 MG tablet Take 1 tablet by mouth Every 8 (Eight) Hours As Needed for Nausea or Vomiting.   • oxyCODONE-acetaminophen (PERCOCET) 5-325 MG per tablet Take 1-2 tablets by mouth Every 4 (Four) Hours As Needed for severe pain.     No current facility-administered medications on file prior to visit.     Current Medications:  Scheduled Meds:  Continuous Infusions:No current facility-administered medications for this visit.    PRN Meds:.          Physical Exam: 46 y.o. male  General Appearance:    Alert, cooperative, in no acute distress                 There were no vitals filed for this visit.   Patient is alert and oriented ×3 no acute distress normal mood physical exam.  Physical exam of the shoulder, incisions looked good there is no erythema,no signs or sx of infection.  He is a little bit of redness where this  Steri-Strips were it seems more from the adhesions his incisions look fine    Assessment  S/P shoulder scope.  I did review intraoperative findings we will remove his sutures today place Steri-Strips I showed him ways to start passive range of motion we will have him start into formal physical therapy next week.  I told him driving would be when he can have both hands on the wheel turned quickly right and left it is probably at least a couple weeks away.  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

## 2022-05-03 ENCOUNTER — OFFICE VISIT (OUTPATIENT)
Dept: ORTHOPEDIC SURGERY | Facility: CLINIC | Age: 47
End: 2022-05-03

## 2022-05-03 VITALS — WEIGHT: 230 LBS | BODY MASS INDEX: 34.07 KG/M2 | HEIGHT: 69 IN | TEMPERATURE: 96.1 F

## 2022-05-03 DIAGNOSIS — Z98.890 S/P ROTATOR CUFF REPAIR: Primary | ICD-10-CM

## 2022-05-03 PROCEDURE — 99024 POSTOP FOLLOW-UP VISIT: CPT | Performed by: ORTHOPAEDIC SURGERY

## 2022-05-03 RX ORDER — RABEPRAZOLE SODIUM 20 MG/1
TABLET, DELAYED RELEASE ORAL
COMMUNITY
Start: 2022-04-30 | End: 2022-06-04

## 2022-05-09 ENCOUNTER — TREATMENT (OUTPATIENT)
Dept: PHYSICAL THERAPY | Facility: CLINIC | Age: 47
End: 2022-05-09

## 2022-05-09 DIAGNOSIS — M25.611 STIFFNESS OF JOINT, SHOULDER REGION, RIGHT: ICD-10-CM

## 2022-05-09 DIAGNOSIS — Z78.9 IMPAIRED MOBILITY AND ADLS: ICD-10-CM

## 2022-05-09 DIAGNOSIS — Z47.89 ORTHOPEDIC AFTERCARE: ICD-10-CM

## 2022-05-09 DIAGNOSIS — Z98.890 S/P RIGHT ROTATOR CUFF REPAIR: Primary | ICD-10-CM

## 2022-05-09 DIAGNOSIS — Z74.09 IMPAIRED MOBILITY AND ADLS: ICD-10-CM

## 2022-05-09 PROCEDURE — 97110 THERAPEUTIC EXERCISES: CPT | Performed by: PHYSICAL THERAPIST

## 2022-05-09 PROCEDURE — 97530 THERAPEUTIC ACTIVITIES: CPT | Performed by: PHYSICAL THERAPIST

## 2022-05-09 PROCEDURE — 97161 PT EVAL LOW COMPLEX 20 MIN: CPT | Performed by: PHYSICAL THERAPIST

## 2022-05-09 NOTE — PROGRESS NOTES
Physical Therapy Initial Evaluation and Plan of Care    Patient: Elvis Vallejo   : 1975  Diagnosis/ICD-10 Code:  S/P right rotator cuff repair [Z98.890]  Referring practitioner: Sabrina Alexander MD  Date of Initial Visit: 2022  Today's Date: 2022  Patient seen for 1 session         Visit Diagnoses:    ICD-10-CM ICD-9-CM   1. S/P right rotator cuff repair  Z98.890 V45.89   2. Orthopedic aftercare  Z47.89 V54.9   3. Stiffness of joint, shoulder region, right  M25.611 719.51   4. Impaired mobility and ADLs  Z74.09 V49.89    Z78.9          Subjective Questionnaire: QuickDASH: 93%      Subjective Evaluation    History of Present Illness  Date of surgery: 2022  Mechanism of injury: 46 year old s/p R sh rot cuff repair (partial tears supra and infraspinatus) with excision distal clavicle. 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 Able to continue working and took a while for work comp eval to ortho to MRI to surgery. No complications same day surgery. Doing shoulder hangs twice a day but no pendulums yet. Gripping small ball. No prior R shoulder issues. Secondary dx of HTN and high cholesterol. 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 Will see ortho again in  with long term plan of returning to [employer] work.   Pain levels are high but not taking his Hydrocodone after second day. Prefers Advil. Using ice every box machine every 2 hours.       Patient Occupation: [employer]   Precautions and Work Restrictions: Off work. Follow protocol. Quality of life: good    Pain  Current pain ratin  At best pain ratin  At worst pain ratin  Location: lateral incision  Quality: dull ache, sharp and throbbing  Relieving factors: change in position, ice, medications and support  Aggravating factors: sleeping and outstretched reach  Progression: improved    Hand dominance: right    Diagnostic Tests  X-ray: abnormal  MRI studies: abnormal    Treatments  No  previous or current treatments  Patient Goals  Patient goals for therapy: decreased pain, increased motion, increased strength, independence with ADLs/IADLs, return to sport/leisure activities and return to work  Patient goal: no hobbies.           Objective          Static Posture     Shoulders  Rounded.    Observations     Right Shoulder  Positive for edema and incision. Negative for drainage.     Additional Shoulder Observation Details  Splotchy echemosis shoulder to mid arm    Tenderness     Right Shoulder  Tenderness in the supraspinatus tendon.     Active Range of Motion     Left Elbow   Normal active range of motion    Right Elbow   Flexion: 140 degrees WFL  Extension: 10 degrees   Forearm supination: 75 degrees   Forearm pronation: 80 degrees WFL    Passive Range of Motion   Left Shoulder   Flexion: 170 degrees   Abduction: 155 degrees   External rotation 90°: 80 degrees   Internal rotation 90°: 70 degrees     Right Shoulder   Flexion: 95 degrees with pain  Adduction: 50 degrees with pain  External rotation 45°: 0 degrees with pain  Internal rotation 45°: 60 degrees     Strength/Myotome Testing     Left Shoulder   Normal muscle strength    Left Elbow   Normal strength    Right Elbow   Flexion: 3+  Extension: 3+  Forearm supination: 3+  Forearm pronation: 3+          Assessment & Plan     Assessment  Impairments: abnormal or restricted ROM, activity intolerance, impaired physical strength, lacks appropriate home exercise program, pain with function and weight-bearing intolerance  Functional Limitations: carrying objects, lifting, sleeping, walking, pulling, pushing, uncomfortable because of pain, moving in bed, reaching behind back, reaching overhead and unable to perform repetitive tasks  Assessment details: Pt with evolving condition just 3 weeks post repair partial tears of supra/infraspinatus and excision distal clavicle. Higher than normal pain levels but pt admits he is not taking pain meds, and has  been moving actively at times not aware of strict precautions in protocol.  Did well with PROM today and HEP. Also with some L shoulder pain and limited ROM in IR with pt stating he feels a strain due to constant use of L UE. Will consult with Dr. Alexander if this doesn't resolve.   Co-morbidity of low tone, moderate obesity, prior heavy wt  with possible degenerative wear and tear along with traumatic injury.   Personal factor of being R handed in physically demanding job thus return to work will require very good strength and ROM. Motivated to get better but not rush his recovery.   Prognosis: good  Prognosis details: Access Code: [email or web address]  URL: [email or web address]  Date: 05/09/2022  Prepared by: Zorre Kimura    Exercises  Seated Shoulder External Rotation AAROM with Cane and Hand in Neutral - 1 x daily - 7 x weekly - 2 sets - 10 reps  Circular Shoulder Pendulum with Table Support - 1 x daily - 7 x weekly - 2 sets - 10 reps  Seated Shoulder Pendulum Exercise - 1 x daily - 7 x weekly - 2 sets - 10 reps  Seated Shoulder Shrugs - 1 x daily - 7 x weekly - 2 sets - 10 reps  Seated Shoulder Flexion Slide at Table Top with Forearm in Neutral - 1 x daily - 7 x weekly - 2 sets - 10 reps  Standing Shoulder Flexion AAROM with Swiss Ball - 1 x daily - 7 x weekly - 2 sets - 10 reps  Standing Shoulder and Trunk Flexion at Table - 1 x daily - 7 x weekly - 2 sets - 10 reps      Goals  Plan Goals: STGs 8 weeks:  1. Regain full PROM  2. 80% AAROM with wand, pulley, wall slides, etc  3. Pain levels < 3/10 at most  4. QDASH to improve from 93 to < 50%  5. Progress to AROM and light resistance in protocol  LTGs 16 weeks:  1. % AAROM  2. 80-90% AROM  3. Lifting wt per job requirement and safe per MD protocol  4. Pt to be able to shoot his gun, bear full weight on UE, return to PLOF and work demands  5. 4/5 or greater strength  6. Q DASH score < 15%      Plan  Therapy options: will be seen for skilled therapy  services  Planned modality interventions: cryotherapy, electrical stimulation/Russian stimulation and thermotherapy (hydrocollator packs)  Planned therapy interventions: manual therapy, soft tissue mobilization, strengthening, stretching, therapeutic activities, joint mobilization, home exercise program and functional ROM exercises  Frequency: 2x week  Duration in weeks: 12  Treatment plan discussed with: patient        History # of Personal Factors and/or Comorbidities: LOW (0)  Examination of Body System(s): # of elements: LOW (1-2)  Clinical Presentation: EVOLVING  Clinical Decision Making: LOW       Timed:         Manual Therapy:    5     mins  20565;     Therapeutic Exercise:    15     mins  26158;     Neuromuscular Thao:        mins  80698;    Therapeutic Activity:  15        mins  45371;     Gait Training:           mins  46121;     Ultrasound:          mins  87407;    Ionto                                   mins   19251  Canalith Repos         mins 21413      Un-Timed:  Electrical Stimulation:         mins  17708 ( );  Dry Needling          mins  81131/57567  Traction          mins  63851  Low Eval     20     Mins  86043  Mod Eval          Mins  41034  High Eval                            Mins  58526        Timed Treatment:   35 mins   Total Treatment:     55   mins          PT: Zorre Zeno Kimura, PT, DPT     License Number:  [id number]     [place]:  [id number]    Electronically signed by Zorre Zeno Kimura, PT, 05/09/22, 8:29 AM EDT    Certification Period: 5/9/2022 thru 8/6/2022  I certify that the therapy services are furnished while this patient is under my care.  The services outlined above are required by this patient, and will be reviewed every 90 days.         Physician Signature:__________________________________________________    PHYSICIAN: Sabrina Alexander MD      DATE:     Please sign and return via fax to [email or web address] . Thank you, [facility].

## 2022-05-16 ENCOUNTER — TREATMENT (OUTPATIENT)
Dept: PHYSICAL THERAPY | Facility: CLINIC | Age: 47
End: 2022-05-16

## 2022-05-16 DIAGNOSIS — M25.611 STIFFNESS OF JOINT, SHOULDER REGION, RIGHT: ICD-10-CM

## 2022-05-16 DIAGNOSIS — Z47.89 ORTHOPEDIC AFTERCARE: ICD-10-CM

## 2022-05-16 DIAGNOSIS — Z78.9 IMPAIRED MOBILITY AND ADLS: ICD-10-CM

## 2022-05-16 DIAGNOSIS — Z98.890 S/P RIGHT ROTATOR CUFF REPAIR: Primary | ICD-10-CM

## 2022-05-16 DIAGNOSIS — Z74.09 IMPAIRED MOBILITY AND ADLS: ICD-10-CM

## 2022-05-16 PROCEDURE — 97110 THERAPEUTIC EXERCISES: CPT | Performed by: PHYSICAL THERAPIST

## 2022-05-16 PROCEDURE — 97140 MANUAL THERAPY 1/> REGIONS: CPT | Performed by: PHYSICAL THERAPIST

## 2022-05-16 NOTE — PROGRESS NOTES
Physical Therapy Daily Progress Note  Visit # 2           Patient: Elvis Vallejo   : 1975  Diagnosis/ICD-10 Code:  S/P right rotator cuff repair [Z98.890]  Referring practitioner: Sabrina Alexander MD  Date of Initial Evaluation:  Type: THERAPY  Noted: 2022      Subjective  Elvis Vallejo reports:   Rates pain 5/10 at present, no pain meds since 2 days post surgery.  Reports has been back to sleeping in bed in elevated reclined position.  No c/o with HEP thus far.     Objective   See Exercise, Manual, and Modality Logs for complete treatment.   Reviewed current HEP.  Concluded session with CP to (R) shoulder x10 min.     Assessment/Plan  Tolerated manual therapy and therapeutic exercise well today, no increased pain reported during or after session.        Progress per Plan of Care and Progress strengthening /stabilization /functional activity         Timed:         Manual Therapy:     18    mins  92763     Therapeutic Exercise:     12    mins  52261     Neuromuscular Thao:        mins  47133    Therapeutic Activity:          mins  08720     Gait Training:           mins  33871     Ultrasound:          mins  19606    Ionto                                   mins  04730  Self Care                            mins  12708    Un-Timed:  Electrical Stimulation:         mins 13911 ( )  Traction          mins 09164    Timed Treatment:   30   mins   Total Treatment:     44   mins    JERILYN Kwong License #[id number]  Physical Therapist Assistant

## 2022-05-17 ENCOUNTER — TELEPHONE (OUTPATIENT)
Dept: ORTHOPEDIC SURGERY | Facility: CLINIC | Age: 47
End: 2022-05-17

## 2022-05-17 NOTE — TELEPHONE ENCOUNTER
Caller: ARY     Relationship: SELF    Best call back number: [phone]    What form or medical record are you requesting: OPERATIVE REPORT, POST OP APT NOTES, AND WORK STATUS.     Who is requesting this form or medical record from you: [facility]    How would you like to receive the form or medical records (pick-up, mail, fax): FAX  If fax, what is the fax number: [phone]    Timeframe paperwork needed: ASAP

## 2022-05-20 ENCOUNTER — TREATMENT (OUTPATIENT)
Dept: PHYSICAL THERAPY | Facility: CLINIC | Age: 47
End: 2022-05-20

## 2022-05-20 DIAGNOSIS — M25.611 STIFFNESS OF JOINT, SHOULDER REGION, RIGHT: ICD-10-CM

## 2022-05-20 DIAGNOSIS — Z98.890 S/P RIGHT ROTATOR CUFF REPAIR: Primary | ICD-10-CM

## 2022-05-20 DIAGNOSIS — Z47.89 ORTHOPEDIC AFTERCARE: ICD-10-CM

## 2022-05-20 PROCEDURE — 97110 THERAPEUTIC EXERCISES: CPT | Performed by: PHYSICAL THERAPIST

## 2022-05-20 PROCEDURE — 97140 MANUAL THERAPY 1/> REGIONS: CPT | Performed by: PHYSICAL THERAPIST

## 2022-05-27 ENCOUNTER — TREATMENT (OUTPATIENT)
Dept: PHYSICAL THERAPY | Facility: CLINIC | Age: 47
End: 2022-05-27

## 2022-05-27 DIAGNOSIS — Z78.9 IMPAIRED MOBILITY AND ADLS: ICD-10-CM

## 2022-05-27 DIAGNOSIS — Z47.89 ORTHOPEDIC AFTERCARE: ICD-10-CM

## 2022-05-27 DIAGNOSIS — Z98.890 S/P RIGHT ROTATOR CUFF REPAIR: Primary | ICD-10-CM

## 2022-05-27 DIAGNOSIS — Z74.09 IMPAIRED MOBILITY AND ADLS: ICD-10-CM

## 2022-05-27 DIAGNOSIS — M25.611 STIFFNESS OF JOINT, SHOULDER REGION, RIGHT: ICD-10-CM

## 2022-05-27 PROCEDURE — 97110 THERAPEUTIC EXERCISES: CPT | Performed by: PHYSICAL THERAPIST

## 2022-05-27 PROCEDURE — 97140 MANUAL THERAPY 1/> REGIONS: CPT | Performed by: PHYSICAL THERAPIST

## 2022-05-27 NOTE — PROGRESS NOTES
Physical Therapy Daily Progress Note  Visit # 4           Patient: Elvis Vallejo   : 1975  Diagnosis/ICD-10 Code:  S/P right rotator cuff repair [Z98.890]  Referring practitioner: Sabrina Alexander MD  Date of Initial Evaluation:  Type: THERAPY  Noted: 2022      Subjective  Elvis Vallejo reports:   Has been working on his HEP regularly.  No significant changes over the past week.  Five weeks s/p surgery today.     Objective   See Exercise, Manual, and Modality Logs for complete treatment.   Reviewed current HEP, added AAROM cane press and overhead shoulder flexion.   Concluded session with CP to (R) shoulder x10 min.     Assessment/Plan  Tolerated manual therapy and therapeutic exercise well today, pain at end-range shoulder flexion but able to more progressively further into range with reps.     Progress per Plan of Care and Progress strengthening /stabilization /functional activity         Timed:         Manual Therapy:     20    mins  33610     Therapeutic Exercise:     10    mins  60582     Neuromuscular Thao:        mins  37398    Therapeutic Activity:          mins  16338     Gait Training:           mins  16985     Ultrasound:          mins  61372    Ionto                                   mins  48250  Self Care                            mins  19390    Un-Timed:  Electrical Stimulation:         mins 79423 ( )  Traction          mins 63816    Timed Treatment:   30   mins   Total Treatment:     44   mins    JERILYN Kwong License #[id number]  Physical Therapist Assistant

## 2022-05-31 ENCOUNTER — TREATMENT (OUTPATIENT)
Dept: PHYSICAL THERAPY | Facility: CLINIC | Age: 47
End: 2022-05-31

## 2022-05-31 DIAGNOSIS — M25.611 STIFFNESS OF JOINT, SHOULDER REGION, RIGHT: ICD-10-CM

## 2022-05-31 DIAGNOSIS — Z47.89 ORTHOPEDIC AFTERCARE: ICD-10-CM

## 2022-05-31 DIAGNOSIS — Z98.890 S/P RIGHT ROTATOR CUFF REPAIR: Primary | ICD-10-CM

## 2022-05-31 PROCEDURE — 97110 THERAPEUTIC EXERCISES: CPT | Performed by: PHYSICAL THERAPIST

## 2022-05-31 NOTE — PROGRESS NOTES
Physical Therapy Daily Progress Note      Patient: Elvis Vallejo   : 1975  Diagnosis/ICD-10 Code:  S/P right rotator cuff repair [Z98.890]  Referring practitioner: Sabrina Alexander MD  Date of Initial Visit: Type: THERAPY  Noted: 2022  Today's Date: 2022  Patient seen for 5 sessions    Subjective : Elvis Vallejo reports: I am out of the sling pretty much all day unless I leave the house.  The exercises are going okay overall.      Objective:   See Exercise, Manual, and Modality Logs for complete treatment.     Assessment/Plan:Pt tolerated treatment well today but continues to have pain limited ROM of the shoulder and gentle stretching continues to be indicated.      Progress per Plan of Care and Progress strengthening /stabilization /functional activity     Manual Therapy:    -     mins  20767;  Therapeutic Exercise:    40     mins  11492;     Neuromuscular Thao:    -    mins  34511;    Therapeutic Activity:     -     mins  92284;     Gait Training:      -     mins  29119;     Ultrasound:     -     mins  62379;    Electrical Stimulation:         mins  28298 ( );  Dry Needling     -     mins self-pay    Timed Treatment:   40   mins   Total Treatment:     55   mins      THEO Redman License #[id number]    Physical Therapist

## 2022-06-03 ENCOUNTER — TREATMENT (OUTPATIENT)
Dept: PHYSICAL THERAPY | Facility: CLINIC | Age: 47
End: 2022-06-03

## 2022-06-03 DIAGNOSIS — Z47.89 ORTHOPEDIC AFTERCARE: ICD-10-CM

## 2022-06-03 DIAGNOSIS — M25.611 STIFFNESS OF JOINT, SHOULDER REGION, RIGHT: ICD-10-CM

## 2022-06-03 DIAGNOSIS — Z78.9 IMPAIRED MOBILITY AND ADLS: ICD-10-CM

## 2022-06-03 DIAGNOSIS — Z98.890 S/P RIGHT ROTATOR CUFF REPAIR: Primary | ICD-10-CM

## 2022-06-03 DIAGNOSIS — Z74.09 IMPAIRED MOBILITY AND ADLS: ICD-10-CM

## 2022-06-03 PROCEDURE — 97110 THERAPEUTIC EXERCISES: CPT | Performed by: PHYSICAL THERAPIST

## 2022-06-03 NOTE — PROGRESS NOTES
Physical Therapy Daily Progress Note      Patient: Elvis Vallejo   : 1975  Diagnosis/ICD-10 Code:  S/P right rotator cuff repair [Z98.890]  Referring practitioner: Sabrina Alexander MD  Date of Initial Visit: Type: THERAPY  Noted: 2022  Today's Date: 6/3/2022  Patient seen for 6 sessions    Subjective : Elvis Vallejo reports:  No new c/o today, still feeling pretty stiff, but pain slowly improving.    Objective:   See Exercise, Manual, and Modality Logs for complete treatment.     Assessment/Plan:  Pt tolerated treatment well overall, continues to present with limited shoulder ROM with empty end feel of the shoulder.    Progress per Plan of Care and Progress strengthening /stabilization /functional activity     Manual Therapy:    5     mins  04000;  Therapeutic Exercise:    40     mins  14071;     Neuromuscular Thao:    -    mins  92839;    Therapeutic Activity:     -     mins  53259;     Gait Training:      -     mins  70522;     Ultrasound:     -     mins  79187;    Electrical Stimulation:         mins  41083 ( );  Dry Needling     -     mins self-pay    Timed Treatment:   45   mins   Total Treatment:     60   mins      JERILYN wKong License #[id number]  Physical Therapist Assistant

## 2022-06-06 ENCOUNTER — OFFICE VISIT (OUTPATIENT)
Dept: ORTHOPEDIC SURGERY | Facility: CLINIC | Age: 47
End: 2022-06-06

## 2022-06-06 VITALS — TEMPERATURE: 96.8 F | WEIGHT: 224 LBS | HEIGHT: 69 IN | BODY MASS INDEX: 33.18 KG/M2

## 2022-06-06 DIAGNOSIS — Z98.890 S/P ROTATOR CUFF REPAIR: Primary | ICD-10-CM

## 2022-06-06 PROCEDURE — 99024 POSTOP FOLLOW-UP VISIT: CPT | Performed by: ORTHOPAEDIC SURGERY

## 2022-06-06 NOTE — PROGRESS NOTES
Shoulder Scope RCR follow Up       Patient: Elvis Vallejo        YOB: 1975      Chief Complaints: shoulder pain right      History of Present Illness: Pt is here f/u shoulder arthroscopy, RCR on the right he is about 7 weeks out still in his sling.  He can definitely start weaning out of that states he is a little frustrated with his therapist tells him he is a little behind but I will have to say I agree with that therapist        Allergies:   Allergies   Allergen Reactions   • Peanut Oil Other (See Comments)     Peanut Dust- Eyes swelled, runny nose, sore throat       Medications:   Home Medications:  Current Outpatient Medications on File Prior to Visit   Medication Sig   • amoxicillin (AMOXIL) 875 MG tablet Take 1 tablet by mouth 2 (Two) Times a Day for 10 days.   • atorvastatin (LIPITOR) 20 MG tablet Take 20 mg by mouth Every Morning.   • dexlansoprazole (DEXILANT) 60 MG capsule Take 60 mg by mouth Daily.   • lisinopril-hydrochlorothiazide (PRINZIDE,ZESTORETIC) 20-12.5 MG per tablet Take 1 tablet by mouth Every Morning.   • loratadine (CLARITIN) 10 MG tablet Take 10 mg by mouth Every Morning.   • montelukast (SINGULAIR) 10 MG tablet Take 10 mg by mouth Every Morning.   • multivitamin with minerals tablet tablet Take 1 tablet by mouth Daily.   • promethazine-dextromethorphan (PROMETHAZINE-DM) 6.25-15 MG/5ML syrup Take 5 mL by mouth 4 (Four) Times a Day As Needed for Cough for up to 6 days.     No current facility-administered medications on file prior to visit.     Current Medications:  Scheduled Meds:  Continuous Infusions:No current facility-administered medications for this visit.    PRN Meds:.          Physical Exam: 46 y.o. male  General Appearance:    Alert, cooperative, in no acute distress                 There were no vitals filed for this visit.   Patient is alert and oriented ×3 no acute distress normal mood physical exam.  Physical exam of the shoulder, incisions looked good there is  no erythema,no signs or sx of infection.    Passive flexion is to about 160 external rotation to 30 rotator cuff strength 4+/5  Assessment  S/P shoulder scope, rotator cuff repair, I do think he is a little behind on motion think he needs to wean out of his sling we talked about things to avoid we talked about his precautions we talked about ways to put pushes motion told him he needs to push his motion work on that several times a day.  He would like to return to work with a light duty capacity next week which I think is fine normally I will see him back in 6 weeks, to see him back 3 weeks for range of motion check

## 2022-06-07 ENCOUNTER — TREATMENT (OUTPATIENT)
Dept: PHYSICAL THERAPY | Facility: CLINIC | Age: 47
End: 2022-06-07

## 2022-06-07 DIAGNOSIS — Z98.890 S/P RIGHT ROTATOR CUFF REPAIR: Primary | ICD-10-CM

## 2022-06-07 DIAGNOSIS — Z47.89 ORTHOPEDIC AFTERCARE: ICD-10-CM

## 2022-06-07 DIAGNOSIS — M25.611 STIFFNESS OF JOINT, SHOULDER REGION, RIGHT: ICD-10-CM

## 2022-06-07 PROCEDURE — 97530 THERAPEUTIC ACTIVITIES: CPT | Performed by: PHYSICAL THERAPIST

## 2022-06-07 PROCEDURE — 97140 MANUAL THERAPY 1/> REGIONS: CPT | Performed by: PHYSICAL THERAPIST

## 2022-06-07 PROCEDURE — 97110 THERAPEUTIC EXERCISES: CPT | Performed by: PHYSICAL THERAPIST

## 2022-06-07 NOTE — PROGRESS NOTES
Physical Therapy Daily Progress Note      Patient: Elvis Vallejo   : 1975  Diagnosis/ICD-10 Code:  S/P right rotator cuff repair [Z98.890]  Referring practitioner: Sabrina Alexander MD  Date of Initial Visit: Type: THERAPY  Noted: 2022  Today's Date: 2022  Patient seen for 7 sessions    Subjective : Elvis Vallejo reports: Doing okay today.  I have tightness more on the back of the shoulder.  Still wearing the sling outside of home.      Objective: PROM: shoulder Flexion:  153 deg, ER: at 30 deg. Abd:  45 deg.    See Exercise, Manual, and Modality Logs for complete treatment.     Assessment/Plan:Pt tolerated treatment well overall and his ROM is appropriate for 7 week post op phase 3 initiation.  Will progress to deltoid isometrics next visit.      Progress per Plan of Care and Progress strengthening /stabilization /functional activity     Manual Therapy:    15     mins  59280;  Therapeutic Exercise:    30     mins  40166;     Neuromuscular Thao:    -    mins  03920;    Therapeutic Activity:     8     mins  93167;   Tests and measures + pt education of protocol progress.    Gait Training:      -     mins  43580;     Ultrasound:     -     mins  37017;    Electrical Stimulation:    -     mins  15080 ( );  Dry Needling     -     mins self-pay    Timed Treatment:   53   mins   Total Treatment:     65   mins      THEO Redman License #[id number]    Physical Therapist

## 2022-06-09 ENCOUNTER — TELEPHONE (OUTPATIENT)
Dept: ORTHOPEDIC SURGERY | Facility: CLINIC | Age: 47
End: 2022-06-09

## 2022-06-09 NOTE — TELEPHONE ENCOUNTER
Caller: JOVANNA     Relationship to patient: SELF     Best call back number: [phone]    Chief complaint: RIGHT SHOULDER    Type of visit: POST OP     Requested date: June 30TH OR July 1ST     If rescheduling, when is the original appointment: 7/25/22     Additional notes: PT CALLED IN TO SEE IF HE CAN GET THE 12 WEEK FU APPT MOVED TO ONE OF THESE DATES IF POSSIBLE , NOT SHOWING ANY AVAILABILITY. PLEASE ADVISE

## 2022-06-14 ENCOUNTER — TREATMENT (OUTPATIENT)
Dept: PHYSICAL THERAPY | Facility: CLINIC | Age: 47
End: 2022-06-14

## 2022-06-14 DIAGNOSIS — Z98.890 S/P RIGHT ROTATOR CUFF REPAIR: Primary | ICD-10-CM

## 2022-06-14 DIAGNOSIS — Z47.89 ORTHOPEDIC AFTERCARE: ICD-10-CM

## 2022-06-14 DIAGNOSIS — M25.611 STIFFNESS OF JOINT, SHOULDER REGION, RIGHT: ICD-10-CM

## 2022-06-14 PROCEDURE — 97110 THERAPEUTIC EXERCISES: CPT | Performed by: PHYSICAL THERAPIST

## 2022-06-14 NOTE — PROGRESS NOTES
Physical Therapy Daily Progress Note      Patient: Elvis Vallejo   : 1975  Diagnosis/ICD-10 Code:  S/P right rotator cuff repair [Z98.890]  Referring practitioner: Sabrina Alexander MD  Date of Initial Visit: Type: THERAPY  Noted: 2022  Today's Date: 2022  Patient seen for 8 sessions    Subjective : Elvis Vallejo reports: No new complaints.  I saw the doctor last week and they think everything looks good.  I go back to work next week on modified/light duty (Sitting at a desk).  Still no pulling, lifting, pushing etc.        Objective: -  See Exercise, Manual, and Modality Logs for complete treatment.     Assessment/Plan:Pt tolerated treatment well but did fatigue quickly with isometric strengthening exercises that we initiated.  Otherwise, he has done well with PT intervention.      Progress per Plan of Care and Progress strengthening /stabilization /functional activity     Manual Therapy:    -     mins  52580;  Therapeutic Exercise:    55     mins  06637;     Neuromuscular Thao:    -    mins  39446;    Therapeutic Activity:     -     mins  95932;     Gait Training:      -     mins  29025;     Ultrasound:     -     mins  29609;    Electrical Stimulation:    -     mins  89846 ( );  Dry Needling     -     mins self-pay    Timed Treatment:   55   mins   Total Treatment:     65   mins      Abel Rojas PT  [place] License #[id number]    Physical Therapist

## 2022-06-17 ENCOUNTER — TREATMENT (OUTPATIENT)
Dept: PHYSICAL THERAPY | Facility: CLINIC | Age: 47
End: 2022-06-17

## 2022-06-17 DIAGNOSIS — Z98.890 S/P RIGHT ROTATOR CUFF REPAIR: Primary | ICD-10-CM

## 2022-06-17 DIAGNOSIS — Z47.89 ORTHOPEDIC AFTERCARE: ICD-10-CM

## 2022-06-17 DIAGNOSIS — Z78.9 IMPAIRED MOBILITY AND ADLS: ICD-10-CM

## 2022-06-17 DIAGNOSIS — Z74.09 IMPAIRED MOBILITY AND ADLS: ICD-10-CM

## 2022-06-17 DIAGNOSIS — M25.611 STIFFNESS OF JOINT, SHOULDER REGION, RIGHT: ICD-10-CM

## 2022-06-17 PROCEDURE — 97530 THERAPEUTIC ACTIVITIES: CPT | Performed by: PHYSICAL THERAPIST

## 2022-06-17 PROCEDURE — 97110 THERAPEUTIC EXERCISES: CPT | Performed by: PHYSICAL THERAPIST

## 2022-06-17 NOTE — PROGRESS NOTES
Physical Therapy Progress Note      Patient: Elvis Vallejo   : 1975  Diagnosis/ICD-10 Code:  S/P right rotator cuff repair [Z98.890]  Referring practitioner: Sabrina Alexander MD  Date of Initial Visit: Type: THERAPY  Noted: 2022  Today's Date: 2022  Patient seen for 9 sessions    Subjective : Elvis Vallejo reports: Overall, the shoulder is getting better.  I try to use it around the house for light things.  The pain intensity is much less.  I go to back to work on a modified work shift on Monday.  It will be a full day.    Pain:  At evaluation (10 worst, 6/10 best)  CURRENT: ( /10, at best :2/10)     Objective:   Static Posture      Shoulders  Rounded.  Tenderness      Right Shoulder  Tenderness in the supraspinatus tendon.      Active Range of Motion (not tested at evaluation)    R shoulder (2022)  Flexion:  125 deg in standing  Scaption:  105 deg in standing     Left Elbow   Normal active range of motion     Right Elbow   Flexion: 140 degrees WFL (Continues)   Extension: 10 degrees (0 deg)   Forearm supination: 75 degrees ( 85 deg)  Forearm pronation: 80 degrees WFL (85 deg)      Passive Range of Motion   Left Shoulder   Flexion: 170 degrees   Abduction: 155 degrees   External rotation 90°: 80 degrees   Internal rotation 90°: 70 degrees      Right Shoulder   Flexion: 95 degrees with pain (138 deg, very shaky to take through range, empty end feel)  Adduction: 50 degrees with pain ( 110 deg)   External rotation 45°: 0 degrees with pain ( 43 deg )  Internal rotation 45°: 60 degrees      Strength/Myotome Testing     Left Shoulder   Normal muscle strength     Left Elbow   Normal strength     Right Elbow   Flexion: 3+ (4+/5)  Extension: 3+ (4-/5)  Forearm supination: 3+ ( 5-/5)  Forearm pronation: 3+ (5-/5)    R Shoulder (with shoulder at side, elbow flexed, Not tested at evaluation)   Flexion: 4/5  Abd:  4/5  Ext:  4+/5  ER: 4/5  IR: 4+/5       See Exercise, Manual, and Modality Logs for  complete treatment.     Assessment/Plan:Elvis is making good improvements with his overall mobility and tolerance for activity. We are progressing into AAROM and strengthening tasks.  He has been encouraged to wear the sling less and become more mindful of postures.   He will benefit from ongoing skilled PT intervention.      Progress per Plan of Care and Progress strengthening /stabilization /functional activity     Manual Therapy:    -     mins  42901;  Therapeutic Exercise:    38     mins  40955;     Neuromuscular Thao:    -    mins  03972;    Therapeutic Activity:     15     mins  36770;   Tests and measures, pt education and progression of activity.    Gait Training:      -     mins  70459;     Ultrasound:     -     mins  07228;    Electrical Stimulation:    -     mins  70925 ( );  Dry Needling     -     mins self-pay    Timed Treatment:   53   mins   Total Treatment:    65   mins      THEO Redman License #[id number]    Physical Therapist

## 2022-06-21 ENCOUNTER — TREATMENT (OUTPATIENT)
Dept: PHYSICAL THERAPY | Facility: CLINIC | Age: 47
End: 2022-06-21

## 2022-06-21 DIAGNOSIS — Z47.89 ORTHOPEDIC AFTERCARE: ICD-10-CM

## 2022-06-21 DIAGNOSIS — M25.611 STIFFNESS OF JOINT, SHOULDER REGION, RIGHT: ICD-10-CM

## 2022-06-21 DIAGNOSIS — Z74.09 IMPAIRED MOBILITY AND ADLS: ICD-10-CM

## 2022-06-21 DIAGNOSIS — Z78.9 IMPAIRED MOBILITY AND ADLS: ICD-10-CM

## 2022-06-21 DIAGNOSIS — Z98.890 S/P RIGHT ROTATOR CUFF REPAIR: Primary | ICD-10-CM

## 2022-06-21 PROCEDURE — 97530 THERAPEUTIC ACTIVITIES: CPT | Performed by: PHYSICAL THERAPIST

## 2022-06-21 PROCEDURE — 97110 THERAPEUTIC EXERCISES: CPT | Performed by: PHYSICAL THERAPIST

## 2022-06-21 NOTE — PROGRESS NOTES
Physical Therapy Daily Progress Note      Patient: Elvis Vallejo   : 1975  Diagnosis/ICD-10 Code:  S/P right rotator cuff repair [Z98.890]  Referring practitioner: Sabrina Alexander MD  Date of Initial Visit: Type: THERAPY  Noted: 2022  Today's Date: 2022  Patient seen for 10 sessions    Subjective : Elvis Vallejo reports: I saw the doctor last week and she said everything looked good.  I did have a tough time at work yesterday having the sling on.  I took it off while at my desk.  I was pretty tired just from being up all day.      Objective: -  See Exercise, Manual, and Modality Logs for complete treatment.     Assessment/Plan:Pt tolerated TE and mild progression of shoulder T band isotonics 4 way.      Progress per Plan of Care and Progress strengthening /stabilization /functional activity     Manual Therapy:    -     mins  80187;  Therapeutic Exercise:    45     mins  66993;     Neuromuscular Thao:    -    mins  05605;    Therapeutic Activity:     8     mins  97406;     Gait Training:      -     mins  39242;     Ultrasound:     -     mins  77967;    Electrical Stimulation:    -     mins  84121 ( );  Dry Needling     -     mins self-pay    Timed Treatment:   53   mins   Total Treatment:     65   mins      THEO Redman License #[id number]    Physical Therapist

## 2022-06-24 ENCOUNTER — TREATMENT (OUTPATIENT)
Dept: PHYSICAL THERAPY | Facility: CLINIC | Age: 47
End: 2022-06-24

## 2022-06-24 DIAGNOSIS — Z98.890 S/P RIGHT ROTATOR CUFF REPAIR: Primary | ICD-10-CM

## 2022-06-24 DIAGNOSIS — Z47.89 ORTHOPEDIC AFTERCARE: ICD-10-CM

## 2022-06-24 PROCEDURE — 97110 THERAPEUTIC EXERCISES: CPT | Performed by: PHYSICAL THERAPIST

## 2022-06-24 PROCEDURE — 97140 MANUAL THERAPY 1/> REGIONS: CPT | Performed by: PHYSICAL THERAPIST

## 2022-06-24 NOTE — PROGRESS NOTES
Physical Therapy Daily Progress Note      Patient: Elvis Vallejo   : 1975  Diagnosis/ICD-10 Code:  S/P right rotator cuff repair [Z98.890]  Referring practitioner: Sabrina Alexander MD  Date of Initial Visit: Type: THERAPY  Noted: 2022  Today's Date: 2022  Patient seen for 11 sessions    Subjective : Elvis Vallejo reports: I stumbled out of bed a few days ago and bumped the shoulder on an ottoman.  I think I just bruised it a little but its okay.  There is soreness but not terrible.      Objective: Noted R rounded shoulder and protracted scapula persists   See Exercise, Manual, and Modality Logs for complete treatment.     Assessment/Plan:Pt tolerated treatment well and recent incident did not affect his recovery beyond reported soreness and increased guarding.  Reviewed T band 4 way and provided feedback on how to correctly perform at home.      Progress per Plan of Care and Progress strengthening /stabilization /functional activity     Manual Therapy:    15     mins  62608;  Therapeutic Exercise:    40     mins  82341;     Neuromuscular Thao:    -    mins  51024;    Therapeutic Activity:     -     mins  75068;     Gait Training:      -     mins  40365;     Ultrasound:     -     mins  57308;    Electrical Stimulation:    -     mins  62832 ( );  Dry Needling     -     mins self-pay    Timed Treatment:   55   mins   Total Treatment:     69   mins      THEO Redman License #[id number]    Physical Therapist

## 2022-06-27 ENCOUNTER — TELEPHONE (OUTPATIENT)
Dept: ORTHOPEDIC SURGERY | Facility: CLINIC | Age: 47
End: 2022-06-27

## 2022-06-27 NOTE — TELEPHONE ENCOUNTER
Caller: [facility] [employer]    Relationship: NURSE     Best call back number: [phone]    What form or medical record are you requesting: OFFICE NOTES/WORK STATUS FROM 6.6.2022 VISIT    Who is requesting this form or medical record from you: [employer]    How would you like to receive the form or medical records (pick-up, mail, fax): FAX  If fax, what is the fax number: [phone]    Timeframe paperwork needed: ASAP

## 2022-06-28 ENCOUNTER — TREATMENT (OUTPATIENT)
Dept: PHYSICAL THERAPY | Facility: CLINIC | Age: 47
End: 2022-06-28

## 2022-06-28 DIAGNOSIS — M25.611 STIFFNESS OF JOINT, SHOULDER REGION, RIGHT: ICD-10-CM

## 2022-06-28 DIAGNOSIS — Z47.89 ORTHOPEDIC AFTERCARE: ICD-10-CM

## 2022-06-28 DIAGNOSIS — Z98.890 S/P RIGHT ROTATOR CUFF REPAIR: Primary | ICD-10-CM

## 2022-06-28 DIAGNOSIS — Z74.09 IMPAIRED MOBILITY AND ADLS: ICD-10-CM

## 2022-06-28 DIAGNOSIS — Z78.9 IMPAIRED MOBILITY AND ADLS: ICD-10-CM

## 2022-06-28 PROCEDURE — 97110 THERAPEUTIC EXERCISES: CPT | Performed by: PHYSICAL THERAPIST

## 2022-06-28 PROCEDURE — 97140 MANUAL THERAPY 1/> REGIONS: CPT | Performed by: PHYSICAL THERAPIST

## 2022-06-28 NOTE — PROGRESS NOTES
Physical Therapy Daily Progress Note      Patient: Elvis Vallejo   : 1975  Diagnosis/ICD-10 Code:  S/P right rotator cuff repair [Z98.890]  Referring practitioner: Sabrina Alexander MD  Date of Initial Visit: Type: THERAPY  Noted: 2022  Today's Date: 2022  Patient seen for 12 sessions    Subjective : Elvis Vallejo reports: No new complaints.   Still having some issues getting the arm overhead due to some weakness.      Objective: -  See Exercise, Manual, and Modality Logs for complete treatment.     Assessment/Plan: Pt tolerated treatment well.  We continue to stretch passively to achieve full motion.  End range still limited by pain and some guarding in flexion, ER plane > IR.  We did progress resistance for IR, ER and ext.  Still struggles with flexion plane strength.      Progress per Plan of Care and Progress strengthening /stabilization /functional activity     Manual Therapy:    10     mins  11809;  Therapeutic Exercise:    43     mins  04280;     Neuromuscular Thao:    -    mins  83605;    Therapeutic Activity:     -     mins  71813;     Gait Training:      -     mins  31924;     Ultrasound:     -     mins  97834;    Electrical Stimulation:    -     mins  26261 ( );  Dry Needling     -     mins self-pay    Timed Treatment:   53   mins   Total Treatment:     68   mins      THEO Redman License #[id number]    Physical Therapist

## 2022-07-01 ENCOUNTER — TREATMENT (OUTPATIENT)
Dept: PHYSICAL THERAPY | Facility: CLINIC | Age: 47
End: 2022-07-01

## 2022-07-01 DIAGNOSIS — Z98.890 S/P RIGHT ROTATOR CUFF REPAIR: Primary | ICD-10-CM

## 2022-07-01 DIAGNOSIS — Z74.09 IMPAIRED MOBILITY AND ADLS: ICD-10-CM

## 2022-07-01 DIAGNOSIS — M25.611 STIFFNESS OF JOINT, SHOULDER REGION, RIGHT: ICD-10-CM

## 2022-07-01 DIAGNOSIS — Z47.89 ORTHOPEDIC AFTERCARE: ICD-10-CM

## 2022-07-01 DIAGNOSIS — Z78.9 IMPAIRED MOBILITY AND ADLS: ICD-10-CM

## 2022-07-01 PROCEDURE — 97140 MANUAL THERAPY 1/> REGIONS: CPT | Performed by: PHYSICAL THERAPIST

## 2022-07-01 PROCEDURE — 97110 THERAPEUTIC EXERCISES: CPT | Performed by: PHYSICAL THERAPIST

## 2022-07-01 NOTE — PROGRESS NOTES
Physical Therapy Daily Progress Note      Patient: Elvis Vallejo   : 1975  Diagnosis/ICD-10 Code:  S/P right rotator cuff repair [Z98.890]  Referring practitioner: Sabrina Alexander MD  Date of Initial Visit: Type: THERAPY  Noted: 2022  Today's Date: 2022  Patient seen for 13 sessions    Subjective : Elvis Vallejo reports:  I'm doing OK.  I am still sleeping propped up on a wedge.  I have been doing the band exercises regularly.  I will be on vacation for the next two weeks.     Objective:   See Exercise, Manual, and Modality Logs for complete treatment.   Reviewed current HEP, progressed therex program with exercises as noted.    Concluded with CP to (R) shoulder x10 min    Assessment/Plan:  Tolerated continued manual therapy and progression of therapeutic exercise well today, no increased pain reported during or after exercises.  We reviewed current HEP and issued additional t-bands for pt to pack for exercise during vacation.     Progress per Plan of Care and Progress strengthening /stabilization /functional activity     Manual Therapy:     8     mins  36317;  Therapeutic Exercise:    40     mins  68084;     Neuromuscular Thao:    -    mins  16656;    Therapeutic Activity:     -     mins  31755;     Gait Training:      -     mins  90035;     Ultrasound:     -     mins  18874;    Electrical Stimulation:    -     mins  93684 ( );  Dry Needling     -     mins self-pay    Timed Treatment:   48   mins   Total Treatment:     60   mins      JERILYN Kwong License #[id number]  Physical Therapist Assistant

## 2022-07-18 ENCOUNTER — TREATMENT (OUTPATIENT)
Dept: PHYSICAL THERAPY | Facility: CLINIC | Age: 47
End: 2022-07-18

## 2022-07-18 DIAGNOSIS — Z47.89 ORTHOPEDIC AFTERCARE: ICD-10-CM

## 2022-07-18 DIAGNOSIS — Z98.890 S/P RIGHT ROTATOR CUFF REPAIR: Primary | ICD-10-CM

## 2022-07-18 DIAGNOSIS — Z78.9 IMPAIRED MOBILITY AND ADLS: ICD-10-CM

## 2022-07-18 DIAGNOSIS — Z74.09 IMPAIRED MOBILITY AND ADLS: ICD-10-CM

## 2022-07-18 DIAGNOSIS — M25.611 STIFFNESS OF JOINT, SHOULDER REGION, RIGHT: ICD-10-CM

## 2022-07-18 PROCEDURE — 97530 THERAPEUTIC ACTIVITIES: CPT | Performed by: PHYSICAL THERAPIST

## 2022-07-18 PROCEDURE — 97140 MANUAL THERAPY 1/> REGIONS: CPT | Performed by: PHYSICAL THERAPIST

## 2022-07-18 PROCEDURE — 97110 THERAPEUTIC EXERCISES: CPT | Performed by: PHYSICAL THERAPIST

## 2022-07-18 NOTE — PROGRESS NOTES
Physical Therapy Daily Progress Note      Patient: Elvis Vallejo   : 1975  Diagnosis/ICD-10 Code:  S/P right rotator cuff repair [Z98.890]  Referring practitioner: Maddi Alexander MD  Date of Initial Visit: Type: THERAPY  Noted: 2022  Today's Date: 2022  Patient seen for 14 sessions    Subjective : Elvis Vallejo reports:  Pt returning to PT from vacation today.  Reports was able to keep up with his exercises while away.  No specific c/o pain in shoulder recently.       Objective:   See Exercise, Manual, and Modality Logs for complete treatment.   Reviewed current HEP, progressed therex program with exercises as noted.    Concluded with CP to (R) shoulder x10 min    Assessment/Plan:  Resumed program as noted with minimal c/o throughout, no increased pain reported during or after exercises.  Remains tight with some flexion limitation but tolerated stretching and mobilization into limited ranges well.      Progress per Plan of Care and Progress strengthening /stabilization /functional activity     Manual Therapy:    15     mins  56397;  Therapeutic Exercise:    30     mins  22310;     Neuromuscular Thao:    -    mins  07378;    Therapeutic Activity:     10     mins  98877;     Gait Training:      -     mins  21882;     Ultrasound:     -     mins  09682;    Electrical Stimulation:    -     mins  30075 ( );  Dry Needling     -     mins self-pay    Timed Treatment:   55   mins   Total Treatment:     70   mins      JERILYN Kwong License #[id number]  Physical Therapist Assistant

## 2022-07-20 ENCOUNTER — TREATMENT (OUTPATIENT)
Dept: PHYSICAL THERAPY | Facility: CLINIC | Age: 47
End: 2022-07-20

## 2022-07-20 DIAGNOSIS — Z47.89 ORTHOPEDIC AFTERCARE: ICD-10-CM

## 2022-07-20 DIAGNOSIS — Z74.09 IMPAIRED MOBILITY AND ADLS: ICD-10-CM

## 2022-07-20 DIAGNOSIS — Z98.890 S/P RIGHT ROTATOR CUFF REPAIR: Primary | ICD-10-CM

## 2022-07-20 DIAGNOSIS — M25.611 STIFFNESS OF JOINT, SHOULDER REGION, RIGHT: ICD-10-CM

## 2022-07-20 DIAGNOSIS — Z78.9 IMPAIRED MOBILITY AND ADLS: ICD-10-CM

## 2022-07-20 PROCEDURE — 97110 THERAPEUTIC EXERCISES: CPT | Performed by: PHYSICAL THERAPIST

## 2022-07-20 PROCEDURE — 97530 THERAPEUTIC ACTIVITIES: CPT | Performed by: PHYSICAL THERAPIST

## 2022-07-20 NOTE — PROGRESS NOTES
Physical Therapy Progress Note      Patient: Elvis Vallejo   : 1975  Referring practitioner: Maddi Alexander MD  Date of Initial Visit: Type: THERAPY  Noted: 2022  Today's Date: 2022  Patient seen for 15 sessions       Visit Diagnoses:    ICD-10-CM ICD-9-CM   1. S/P right rotator cuff repair  Z98.890 V45.89   2. Orthopedic aftercare  Z47.89 V54.9   3. Stiffness of joint, shoulder region, right  M25.611 719.51   4. Impaired mobility and ADLs  Z74.09 V49.89    Z78.9      Subjective No new complaints.  Back to work and still with limited work duty.      Objective :      Active ROM   R shoulder (2022) vs. (CURRENT)   Flexion:  125 deg in standing ( 135 deg)   Scaption:  105 deg in standing ( 108 deg)      Passive Range of Motion      Right Shoulder   Flexion: 95 degrees with pain (138 deg, very shaky to take through range, empty end feel)  ( 170 deg).   Adduction: 50 degrees with pain ( 110 deg), ( 128 deg).   External rotation 45°: 0 degrees with pain ( 43 deg ) At 90 deg abd:  40 deg   Internal rotation 45°: 60 degrees (at 90 deg abd:  55 deg)      Strength/Myotome Testing     Left Shoulder   Normal muscle strength     Left Elbow   Normal strength     Right Elbow   Flexion: 3+ (4+/5) (5/5)   Extension: 3+ (4-/5) (5/5)   Forearm supination: 3+ ( 5-/5) (5/5)   Forearm pronation: 3+ (5-/5) (5/5)      R Shoulder (with shoulder at side, elbow flexed, Not tested at evaluation)   Flexion: 4/5 (at 90 deg flexion:  4+/5)  Abd:  4/5 (at 90 deg abd: 4+/5)  Ext:  4+/5 (5-/5)   ER: 4/5 (4+/5 at neutral )  IR: 4+/5  (5-/5 at neutral)  See Exercise, Manual, and Modality Logs for complete treatment.     Assessment/Plan Elvis is making good improvement with shoulder objective measures but he continues to have difficulty with OH lifting actively secondary to weak scapular stabilization and protracted scapula and thoracic kyphosis.  We have progressed program to help facilitate functional OH to WFL.       Progress per Plan of Care and Progress strengthening /stabilization /functional activity     2x /week x 4 weeks.      Timed:         Manual Therapy:    -     mins  97587;     Therapeutic Exercise:    38     mins  64224;     Neuromuscular Thao:    -    mins  80471;    Therapeutic Activity:     15     mins  56482;     Gait Training:      -     mins  08374;     Ultrasound:     -     mins  85125;    Electrical Stimulation:    -     mins  44859 ( );  Dry Needling     -     mins self-pay  Traction     -     mins 89808      Timed Treatment:   53   mins   Total Treatment:     69   mins    THEO Redman License: [id number]

## 2022-07-25 ENCOUNTER — TREATMENT (OUTPATIENT)
Dept: PHYSICAL THERAPY | Facility: CLINIC | Age: 47
End: 2022-07-25

## 2022-07-25 ENCOUNTER — OFFICE VISIT (OUTPATIENT)
Dept: ORTHOPEDIC SURGERY | Facility: CLINIC | Age: 47
End: 2022-07-25

## 2022-07-25 VITALS — TEMPERATURE: 96.2 F | WEIGHT: 223 LBS | HEIGHT: 69 IN | BODY MASS INDEX: 33.03 KG/M2

## 2022-07-25 DIAGNOSIS — M25.611 STIFFNESS OF JOINT, SHOULDER REGION, RIGHT: ICD-10-CM

## 2022-07-25 DIAGNOSIS — Z47.89 ORTHOPEDIC AFTERCARE: ICD-10-CM

## 2022-07-25 DIAGNOSIS — Z98.890 S/P RIGHT ROTATOR CUFF REPAIR: Primary | ICD-10-CM

## 2022-07-25 DIAGNOSIS — Z98.890 S/P ROTATOR CUFF REPAIR: Primary | ICD-10-CM

## 2022-07-25 PROCEDURE — 99212 OFFICE O/P EST SF 10 MIN: CPT | Performed by: ORTHOPAEDIC SURGERY

## 2022-07-25 PROCEDURE — 97530 THERAPEUTIC ACTIVITIES: CPT | Performed by: PHYSICAL THERAPIST

## 2022-07-25 PROCEDURE — 97110 THERAPEUTIC EXERCISES: CPT | Performed by: PHYSICAL THERAPIST

## 2022-07-25 PROCEDURE — 97140 MANUAL THERAPY 1/> REGIONS: CPT | Performed by: PHYSICAL THERAPIST

## 2022-07-25 RX ORDER — MELOXICAM 15 MG/1
TABLET ORAL
Qty: 30 TABLET | Refills: 0 | Status: SHIPPED | OUTPATIENT
Start: 2022-07-25 | End: 2022-08-22

## 2022-07-25 NOTE — PROGRESS NOTES
Physical Therapy Treatment Note      Patient: Elvis Vallejo   : 1975  Referring practitioner: Maddi Alexander MD  Date of Initial Visit: Type: THERAPY  Noted: 2022  Today's Date: 2022  Patient seen for 16 sessions       Visit Diagnoses:    ICD-10-CM ICD-9-CM   1. S/P right rotator cuff repair  Z98.890 V45.89   2. Orthopedic aftercare  Z47.89 V54.9   3. Stiffness of joint, shoulder region, right  M25.611 719.51     Subjective:  No new complaints today.  Doing well with current HEP and limited work duty.      Objective:    See Exercise, Manual, and Modality Logs for complete treatment.     Assessment/Plan:  Tolerated continued manual therapy and progression of therapeutic exercise well today, no increased pain reported during or after exercises.        Progress per Plan of Care and Progress strengthening /stabilization /functional activity          Timed:         Manual Therapy:    8     mins  71299;     Therapeutic Exercise:    28     mins  81592;     Neuromuscular Thao:    -    mins  25846;    Therapeutic Activity:     15     mins  49380;     Gait Training:      -     mins  66109;     Ultrasound:     -     mins  45463;    Electrical Stimulation:    -     mins  37010 ( );  Dry Needling     -     mins self-pay  Traction     -     mins 26885      Timed Treatment:   51   mins   Total Treatment:     70   mins      JERILYN Kwong License #[id number]  Physical Therapist Assistant

## 2022-07-25 NOTE — PROGRESS NOTES
Right Shoulder Scope RCR follow Up       Patient: Elvis Vallejo        YOB: 1975      Chief Complaints: Right shoulder pain      History of Present Illness: Pt is here f/u shoulder arthroscopy, RCR he is a little over 12 weeks out doing well states he still little achy but overall seems to be improving with every visit he still working with physical therapy        Allergies:   Allergies   Allergen Reactions   • Peanut Oil Other (See Comments)     Peanut Dust- Eyes swelled, runny nose, sore throat       Medications:   Home Medications:  Current Outpatient Medications on File Prior to Visit   Medication Sig   • atorvastatin (LIPITOR) 20 MG tablet Take 20 mg by mouth Every Morning.   • dexlansoprazole (DEXILANT) 60 MG capsule Take 60 mg by mouth Daily.   • lisinopril-hydrochlorothiazide (PRINZIDE,ZESTORETIC) 20-12.5 MG per tablet Take 1 tablet by mouth Every Morning.   • loratadine (CLARITIN) 10 MG tablet Take 10 mg by mouth Every Morning.   • montelukast (SINGULAIR) 10 MG tablet Take 10 mg by mouth Every Morning.   • multivitamin with minerals tablet tablet Take 1 tablet by mouth Daily.     No current facility-administered medications on file prior to visit.     Current Medications:  Scheduled Meds:  Continuous Infusions:No current facility-administered medications for this visit.    PRN Meds:.          Physical Exam: 46 y.o. male  General Appearance:    Alert, cooperative, in no acute distress                 There were no vitals filed for this visit.   Patient is alert and oriented ×3 no acute distress normal mood physical exam.  Physical exam of the shoulder, incisions looked good there is no erythema,no signs or sx of infection.  Physical exam of the right shoulder reveals no overlying skin changes no lymphedema no lymphadenopathy.  Patient has active flexion 180 with mild symptoms abduction is similar external rotation is to 50 and internal rotation to the upper lumbar spine with mild symptoms.   Patient has good rotator cuff strength 4+ over 5 with isometric strength testing with pain.  Patient has a positive impingement and a positive Hernadez sign.  Patient has good cervical range of motion which is full and asymptomatic no radicular symptoms.  Patient has a normal elbow exam.  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  He does have a remote history of tripping over a stool but I feel like he is progressing reasonably

## 2022-07-28 ENCOUNTER — TREATMENT (OUTPATIENT)
Dept: PHYSICAL THERAPY | Facility: CLINIC | Age: 47
End: 2022-07-28

## 2022-07-28 DIAGNOSIS — Z78.9 IMPAIRED MOBILITY AND ADLS: ICD-10-CM

## 2022-07-28 DIAGNOSIS — Z47.89 ORTHOPEDIC AFTERCARE: ICD-10-CM

## 2022-07-28 DIAGNOSIS — Z98.890 S/P RIGHT ROTATOR CUFF REPAIR: Primary | ICD-10-CM

## 2022-07-28 DIAGNOSIS — M25.611 STIFFNESS OF JOINT, SHOULDER REGION, RIGHT: ICD-10-CM

## 2022-07-28 DIAGNOSIS — Z74.09 IMPAIRED MOBILITY AND ADLS: ICD-10-CM

## 2022-07-28 PROCEDURE — 97110 THERAPEUTIC EXERCISES: CPT | Performed by: PHYSICAL THERAPIST

## 2022-07-28 PROCEDURE — 97530 THERAPEUTIC ACTIVITIES: CPT | Performed by: PHYSICAL THERAPIST

## 2022-07-28 NOTE — PROGRESS NOTES
Physical Therapy Daily Treatment Note      Patient: Elvis Vallejo   : 1975  Referring practitioner: Maddi Alexander MD  Date of Initial Visit: Type: THERAPY  Noted: 2022  Today's Date: 2022  Patient seen for 17 sessions       Visit Diagnoses:    ICD-10-CM ICD-9-CM   1. S/P right rotator cuff repair  Z98.890 V45.89   2. Orthopedic aftercare  Z47.89 V54.9   3. Stiffness of joint, shoulder region, right  M25.611 719.51   4. Impaired mobility and ADLs  Z74.09 V49.89    Z78.9        Subjective Doing pretty well.  I've been able to use the green bands at home for my shoulder strengthening activity.      Objective   See Exercise, Manual, and Modality Logs for complete treatment.       Assessment/Plan Pt tolerated treatment well with mild progression of OH stabilization in supine.  He still has limitations with end range mobility of elevation.      Progress per Plan of Care and Progress strengthening /stabilization /functional activity    Timed:         Manual Therapy:    -     mins  19621;     Therapeutic Exercise:    45     mins  63164;     Neuromuscular Thao:    -    mins  80638;    Therapeutic Activity:     10     mins  14388;     Gait Training:      -     mins  61862;     Ultrasound:     -     mins  26389;    Electrical Stimulation:    -     mins  57633 ( );  Dry Needling     -     mins self-pay  Traction     -     mins 54276      Timed Treatment:   55   mins   Total Treatment:     68   mins    THEO Redman License: [id number]

## 2022-08-01 ENCOUNTER — TREATMENT (OUTPATIENT)
Dept: PHYSICAL THERAPY | Facility: CLINIC | Age: 47
End: 2022-08-01

## 2022-08-01 DIAGNOSIS — Z78.9 IMPAIRED MOBILITY AND ADLS: ICD-10-CM

## 2022-08-01 DIAGNOSIS — Z98.890 S/P RIGHT ROTATOR CUFF REPAIR: Primary | ICD-10-CM

## 2022-08-01 DIAGNOSIS — Z47.89 ORTHOPEDIC AFTERCARE: ICD-10-CM

## 2022-08-01 DIAGNOSIS — M25.611 STIFFNESS OF JOINT, SHOULDER REGION, RIGHT: ICD-10-CM

## 2022-08-01 DIAGNOSIS — Z74.09 IMPAIRED MOBILITY AND ADLS: ICD-10-CM

## 2022-08-01 PROCEDURE — 97140 MANUAL THERAPY 1/> REGIONS: CPT | Performed by: PHYSICAL THERAPIST

## 2022-08-01 PROCEDURE — 97110 THERAPEUTIC EXERCISES: CPT | Performed by: PHYSICAL THERAPIST

## 2022-08-01 NOTE — PROGRESS NOTES
Physical Therapy Daily Treatment Note      Patient: Elvis Vallejo   : 1975  Referring practitioner: Maddi Alexander MD  Date of Initial Visit: Type: THERAPY  Noted: 2022  Today's Date: 2022  Patient seen for 18 sessions       Visit Diagnoses:    ICD-10-CM ICD-9-CM   1. S/P right rotator cuff repair  Z98.890 V45.89   2. Orthopedic aftercare  Z47.89 V54.9   3. Stiffness of joint, shoulder region, right  M25.611 719.51   4. Impaired mobility and ADLs  Z74.09 V49.89    Z78.9      Subjective No new complaints.  The doctor said that I'm doing pretty well, I'm just healing slowly      Objective:  R shoulder:  Supine PROM flexion:  160 deg.                                                       AROM flexion: 150 deg   See Exercise, Manual, and Modality Logs for complete treatment.     Assessment/Plan Elvis still struggles with AROM for OH motions but he tolerated aggressive PROM stretching well.  Prone T's I's Y's are still difficult and he has trouble setting the shoulder blade.      Progress per Plan of Care and Progress strengthening /stabilization /functional activity    Timed:         Manual Therapy:    15     mins  18267;     Therapeutic Exercise:    40     mins  12096;     Neuromuscular Thao:    -    mins  90716;    Therapeutic Activity:     -     mins  24921;     Gait Training:      -     mins  56380;     Ultrasound:     -     mins  43312;    Electrical Stimulation:    -     mins  34368 ( );  Dry Needling     -     mins self-pay  Traction     -     mins 75193      Timed Treatment:   55   mins   Total Treatment:     68   mins    THEO Redman License: [id number]

## 2022-08-04 ENCOUNTER — TREATMENT (OUTPATIENT)
Dept: PHYSICAL THERAPY | Facility: CLINIC | Age: 47
End: 2022-08-04

## 2022-08-04 DIAGNOSIS — Z98.890 S/P RIGHT ROTATOR CUFF REPAIR: Primary | ICD-10-CM

## 2022-08-04 DIAGNOSIS — Z74.09 IMPAIRED MOBILITY AND ADLS: ICD-10-CM

## 2022-08-04 DIAGNOSIS — Z78.9 IMPAIRED MOBILITY AND ADLS: ICD-10-CM

## 2022-08-04 DIAGNOSIS — M25.611 STIFFNESS OF JOINT, SHOULDER REGION, RIGHT: ICD-10-CM

## 2022-08-04 DIAGNOSIS — Z47.89 ORTHOPEDIC AFTERCARE: ICD-10-CM

## 2022-08-04 PROCEDURE — 97110 THERAPEUTIC EXERCISES: CPT | Performed by: PHYSICAL THERAPIST

## 2022-08-04 PROCEDURE — 97140 MANUAL THERAPY 1/> REGIONS: CPT | Performed by: PHYSICAL THERAPIST

## 2022-08-04 NOTE — PROGRESS NOTES
Physical Therapy Daily Treatment Note      Patient: Elvis Vallejo   : 1975  Referring practitioner: Maddi Alexander MD  Date of Initial Visit: Type: THERAPY  Noted: 2022  Today's Date: 2022  Patient seen for 19 sessions       Visit Diagnoses:    ICD-10-CM ICD-9-CM   1. S/P right rotator cuff repair  Z98.890 V45.89   2. Orthopedic aftercare  Z47.89 V54.9   3. Stiffness of joint, shoulder region, right  M25.611 719.51   4. Impaired mobility and ADLs  Z74.09 V49.89    Z78.9        Subjective I was sore after last visit but not too bad.  I am okay with stretching hard again today if it's going to help me improve my range.      Objective   See Exercise, Manual, and Modality Logs for complete treatment.     Assessment/Plan We continue to work on abduction/Scaption directional elevation.  We promoted scapular retraction and upward rotation with manual intervention and TE today.      Progress per Plan of Care and Progress strengthening /stabilization /functional activity    Timed:         Manual Therapy:    18     mins  05080;     Therapeutic Exercise:    38     mins  55958;     Neuromuscular Thao:    -    mins  05416;    Therapeutic Activity:     -     mins  44598;     Gait Training:      -     mins  25186;     Ultrasound:     -     mins  59602;    Electrical Stimulation:    --     mins  82307 ( );  Dry Needling     -     mins self-pay  Traction     -     mins 75991      Timed Treatment:   56   mins   Total Treatment:     69   mins    THEO Redman License: [id number]

## 2022-08-09 ENCOUNTER — TREATMENT (OUTPATIENT)
Dept: PHYSICAL THERAPY | Facility: CLINIC | Age: 47
End: 2022-08-09

## 2022-08-09 ENCOUNTER — TELEPHONE (OUTPATIENT)
Dept: ORTHOPEDIC SURGERY | Facility: CLINIC | Age: 47
End: 2022-08-09

## 2022-08-09 DIAGNOSIS — Z74.09 IMPAIRED MOBILITY AND ADLS: ICD-10-CM

## 2022-08-09 DIAGNOSIS — Z47.89 ORTHOPEDIC AFTERCARE: ICD-10-CM

## 2022-08-09 DIAGNOSIS — Z78.9 IMPAIRED MOBILITY AND ADLS: ICD-10-CM

## 2022-08-09 DIAGNOSIS — Z98.890 S/P RIGHT ROTATOR CUFF REPAIR: Primary | ICD-10-CM

## 2022-08-09 DIAGNOSIS — M25.511 RIGHT SHOULDER PAIN, UNSPECIFIED CHRONICITY: Primary | ICD-10-CM

## 2022-08-09 DIAGNOSIS — M25.611 STIFFNESS OF JOINT, SHOULDER REGION, RIGHT: ICD-10-CM

## 2022-08-09 PROCEDURE — 97140 MANUAL THERAPY 1/> REGIONS: CPT | Performed by: PHYSICAL THERAPIST

## 2022-08-09 PROCEDURE — 97110 THERAPEUTIC EXERCISES: CPT | Performed by: PHYSICAL THERAPIST

## 2022-08-09 NOTE — PROGRESS NOTES
Physical Therapy Daily Treatment Note      Patient: Elvis Vallejo   : 1975  Referring practitioner: Maddi Alexander MD  Date of Initial Visit: Type: THERAPY  Noted: 2022  Today's Date: 2022  Patient seen for 20 sessions       Visit Diagnoses:    ICD-10-CM ICD-9-CM   1. S/P right rotator cuff repair  Z98.890 V45.89   2. Orthopedic aftercare  Z47.89 V54.9   3. Stiffness of joint, shoulder region, right  M25.611 719.51   4. Impaired mobility and ADLs  Z74.09 V49.89    Z78.9        Subjective   Nothing new to report from [facility] visit.  Continues to voice agreement with stretching hard again today.    Objective   See Exercise, Manual, and Modality Logs for complete treatment.     Assessment/Plan   We continue to work on abduction/scaption directional elevation.  Pt tolerating increased intensity and duration with stretching well.       Progress per Plan of Care and Progress strengthening /stabilization /functional activity    Timed:         Manual Therapy:    18     mins  10021;     Therapeutic Exercise:    30     mins  98102;     Neuromuscular Thao:    -    mins  81463;    Therapeutic Activity:     -     mins  40335;     Gait Training:      -     mins  72051;     Ultrasound:     -     mins  97744;    Electrical Stimulation:    --     mins  12964 ( );  Dry Needling     -     mins self-pay  Traction     -     mins 58444      Timed Treatment:   48   mins   Total Treatment:     69   mins    JERILYN Kwong License #[id number]  Physical Therapist Assistant

## 2022-08-11 ENCOUNTER — TREATMENT (OUTPATIENT)
Dept: PHYSICAL THERAPY | Facility: CLINIC | Age: 47
End: 2022-08-11

## 2022-08-11 DIAGNOSIS — Z78.9 IMPAIRED MOBILITY AND ADLS: ICD-10-CM

## 2022-08-11 DIAGNOSIS — Z47.89 ORTHOPEDIC AFTERCARE: ICD-10-CM

## 2022-08-11 DIAGNOSIS — Z98.890 S/P RIGHT ROTATOR CUFF REPAIR: Primary | ICD-10-CM

## 2022-08-11 DIAGNOSIS — M25.611 STIFFNESS OF JOINT, SHOULDER REGION, RIGHT: ICD-10-CM

## 2022-08-11 DIAGNOSIS — Z74.09 IMPAIRED MOBILITY AND ADLS: ICD-10-CM

## 2022-08-11 PROCEDURE — 97140 MANUAL THERAPY 1/> REGIONS: CPT | Performed by: PHYSICAL THERAPIST

## 2022-08-11 PROCEDURE — 97530 THERAPEUTIC ACTIVITIES: CPT | Performed by: PHYSICAL THERAPIST

## 2022-08-11 PROCEDURE — 97110 THERAPEUTIC EXERCISES: CPT | Performed by: PHYSICAL THERAPIST

## 2022-08-11 NOTE — PROGRESS NOTES
Physical Therapy Daily Treatment Note      Patient: Elvis Vallejo   : 1975  Referring practitioner: Maddi Alexander MD  Date of Initial Visit: Type: THERAPY  Noted: 2022  Today's Date: 2022  Patient seen for 21 sessions       Visit Diagnoses:    ICD-10-CM ICD-9-CM   1. S/P right rotator cuff repair  Z98.890 V45.89   2. Orthopedic aftercare  Z47.89 V54.9   3. Stiffness of joint, shoulder region, right  M25.611 719.51   4. Impaired mobility and ADLs  Z74.09 V49.89    Z78.9        Subjective   Doing well, feeling some progress with the increased stretching.    Objective   See Exercise, Manual, and Modality Logs for complete treatment.     Assessment/Plan    Continued progression with ROM/stretching, adding contract/relax into additional flexion range.  Pt tolerating increased intensity and duration with stretching well.       Progress per Plan of Care and Progress strengthening /stabilization /functional activity    Timed:         Manual Therapy:    18     mins  41248;     Therapeutic Exercise:    30     mins  77816;     Neuromuscular Thao:    -    mins  06156;    Therapeutic Activity:     10     mins  00720;     Gait Training:      -     mins  57077;     Ultrasound:     -     mins  12245;    Electrical Stimulation:    --     mins  15165 ( );  Dry Needling     -     mins self-pay  Traction     -     mins 41235      Timed Treatment:   58   mins   Total Treatment:     73   mins    JERILYN Kwong License #[id number]  Physical Therapist Assistant

## 2022-08-15 ENCOUNTER — TREATMENT (OUTPATIENT)
Dept: PHYSICAL THERAPY | Facility: CLINIC | Age: 47
End: 2022-08-15

## 2022-08-15 DIAGNOSIS — Z47.89 ORTHOPEDIC AFTERCARE: ICD-10-CM

## 2022-08-15 DIAGNOSIS — Z74.09 IMPAIRED MOBILITY AND ADLS: ICD-10-CM

## 2022-08-15 DIAGNOSIS — M25.611 STIFFNESS OF JOINT, SHOULDER REGION, RIGHT: ICD-10-CM

## 2022-08-15 DIAGNOSIS — Z98.890 S/P RIGHT ROTATOR CUFF REPAIR: Primary | ICD-10-CM

## 2022-08-15 DIAGNOSIS — Z78.9 IMPAIRED MOBILITY AND ADLS: ICD-10-CM

## 2022-08-15 PROCEDURE — 97110 THERAPEUTIC EXERCISES: CPT | Performed by: PHYSICAL THERAPIST

## 2022-08-15 PROCEDURE — 97140 MANUAL THERAPY 1/> REGIONS: CPT | Performed by: PHYSICAL THERAPIST

## 2022-08-15 NOTE — PROGRESS NOTES
Physical Therapy Daily Treatment Note      Patient: Elvis Vallejo   : 1975  Referring practitioner: Maddi Alexander MD  Date of Initial Visit: Type: THERAPY  Noted: 2022  Today's Date: 8/15/2022  Patient seen for 22 sessions       Visit Diagnoses:    ICD-10-CM ICD-9-CM   1. S/P right rotator cuff repair  Z98.890 V45.89   2. Orthopedic aftercare  Z47.89 V54.9   3. Impaired mobility and ADLs  Z74.09 V49.89    Z78.9    4. Stiffness of joint, shoulder region, right  M25.611 719.51     Subjective No new complaints today and I have the most difficulty getting my arm out to the side of my body.      Objective   See Exercise, Manual, and Modality Logs for complete treatment.     Assessment/Plan Pt tolerated treatment well and we continue to facilitate lateral elevation of the R arm due to limited AROM in that direction.      Progress per Plan of Care and Progress strengthening /stabilization /functional activity    Timed:         Manual Therapy:    10     mins  90770;     Therapeutic Exercise:    40     mins  39003;     Neuromuscular Thao:    -    mins  32679;    Therapeutic Activity:     5     mins  80444;     Gait Training:      -     mins  63664;     Ultrasound:     -     mins  40943;    Electrical Stimulation:    -     mins  80853 ( );  Dry Needling     -     mins self-pay  Traction     -     mins 24422    Timed Treatment:   55   mins   Total Treatment:     68   mins    THEO Redman License: [id number]

## 2022-08-18 ENCOUNTER — TREATMENT (OUTPATIENT)
Dept: PHYSICAL THERAPY | Facility: CLINIC | Age: 47
End: 2022-08-18

## 2022-08-18 DIAGNOSIS — Z78.9 IMPAIRED MOBILITY AND ADLS: ICD-10-CM

## 2022-08-18 DIAGNOSIS — Z74.09 IMPAIRED MOBILITY AND ADLS: ICD-10-CM

## 2022-08-18 DIAGNOSIS — Z98.890 S/P RIGHT ROTATOR CUFF REPAIR: Primary | ICD-10-CM

## 2022-08-18 DIAGNOSIS — M25.611 STIFFNESS OF JOINT, SHOULDER REGION, RIGHT: ICD-10-CM

## 2022-08-18 DIAGNOSIS — Z47.89 ORTHOPEDIC AFTERCARE: ICD-10-CM

## 2022-08-18 PROCEDURE — 97110 THERAPEUTIC EXERCISES: CPT | Performed by: PHYSICAL THERAPIST

## 2022-08-18 PROCEDURE — 97530 THERAPEUTIC ACTIVITIES: CPT | Performed by: PHYSICAL THERAPIST

## 2022-08-18 PROCEDURE — 97140 MANUAL THERAPY 1/> REGIONS: CPT | Performed by: PHYSICAL THERAPIST

## 2022-08-18 NOTE — PROGRESS NOTES
Physical Therapy Daily Treatment Note      Patient: Elvis Vallejo   : 1975  Referring practitioner: Maddi Alexander MD  Date of Initial Visit: Type: THERAPY  Noted: 2022  Today's Date: 2022  Patient seen for 23 sessions       Visit Diagnoses:    ICD-10-CM ICD-9-CM   1. S/P right rotator cuff repair  Z98.890 V45.89   2. Orthopedic aftercare  Z47.89 V54.9   3. Impaired mobility and ADLs  Z74.09 V49.89    Z78.9    4. Stiffness of joint, shoulder region, right  M25.611 719.51     Subjective No new complaints today, feels things are slowly improving.     Objective   See Exercise, Manual, and Modality Logs for complete treatment.     Assessment/Plan Pt tolerated treatment well with continued stretching into end ranges ad strengthening/stability exercises.     Progress per Plan of Care and Progress strengthening /stabilization /functional activity    Timed:         Manual Therapy:    10     mins  25366;     Therapeutic Exercise:    30     mins  01331;     Neuromuscular Thao:    -    mins  19388;    Therapeutic Activity:     15     mins  00855;     Gait Training:      -     mins  32924;     Ultrasound:     -     mins  29075;    Electrical Stimulation:    -     mins  31912 ( );  Dry Needling     -     mins self-pay  Traction     -     mins 86842    Timed Treatment:   55   mins   Total Treatment:     68   mins      JERILYN Kwong License #[id number]  Physical Therapist Assistant

## 2022-08-22 ENCOUNTER — TREATMENT (OUTPATIENT)
Dept: PHYSICAL THERAPY | Facility: CLINIC | Age: 47
End: 2022-08-22

## 2022-08-22 DIAGNOSIS — Z74.09 IMPAIRED MOBILITY AND ADLS: ICD-10-CM

## 2022-08-22 DIAGNOSIS — Z47.89 ORTHOPEDIC AFTERCARE: ICD-10-CM

## 2022-08-22 DIAGNOSIS — Z78.9 IMPAIRED MOBILITY AND ADLS: ICD-10-CM

## 2022-08-22 DIAGNOSIS — M25.611 STIFFNESS OF JOINT, SHOULDER REGION, RIGHT: ICD-10-CM

## 2022-08-22 DIAGNOSIS — Z98.890 S/P RIGHT ROTATOR CUFF REPAIR: Primary | ICD-10-CM

## 2022-08-22 PROCEDURE — 97110 THERAPEUTIC EXERCISES: CPT | Performed by: PHYSICAL THERAPIST

## 2022-08-22 PROCEDURE — 97140 MANUAL THERAPY 1/> REGIONS: CPT | Performed by: PHYSICAL THERAPIST

## 2022-08-22 PROCEDURE — 97530 THERAPEUTIC ACTIVITIES: CPT | Performed by: PHYSICAL THERAPIST

## 2022-08-22 RX ORDER — MELOXICAM 15 MG/1
TABLET ORAL
Qty: 30 TABLET | Refills: 0 | Status: SHIPPED | OUTPATIENT
Start: 2022-08-22

## 2022-08-22 NOTE — PROGRESS NOTES
Physical Therapy Daily Treatment Note      Patient: Elvis Vallejo   : 1975  Referring practitioner: Maddi Alexander MD  Date of Initial Visit: Type: THERAPY  Noted: 2022  Today's Date: 2022  Patient seen for 24 sessions       Visit Diagnoses:    ICD-10-CM ICD-9-CM   1. S/P right rotator cuff repair  Z98.890 V45.89   2. Orthopedic aftercare  Z47.89 V54.9   3. Impaired mobility and ADLs  Z74.09 V49.89    Z78.9    4. Stiffness of joint, shoulder region, right  M25.611 719.51     Subjective   No new complaints today, feeling slow, steady progress.     Objective   See Exercise, Manual, and Modality Logs for complete treatment.    Added supine single arm flyes with 1# for progression into limited range and for building eccentric strength.    Assessment/Plan   Pt tolerated treatment well with continued stretching/MWM into end ranges, and strengthening/stability exercises.     Progress per Plan of Care and Progress strengthening /stabilization /functional activity    Timed:         Manual Therapy:    10     mins  06918;     Therapeutic Exercise:    30     mins  48181;     Neuromuscular Thao:    -    mins  62797;    Therapeutic Activity:     15     mins  39952;     Gait Training:      -     mins  84875;     Ultrasound:     -     mins  03546;    Electrical Stimulation:    -     mins  44664 ( );  Dry Needling     -     mins self-pay  Traction     -     mins 89275    Timed Treatment:   55   mins   Total Treatment:     75   mins      JERILYN Kwong License #[id number]  Physical Therapist Assistant

## 2022-08-25 ENCOUNTER — TREATMENT (OUTPATIENT)
Dept: PHYSICAL THERAPY | Facility: CLINIC | Age: 47
End: 2022-08-25

## 2022-08-25 DIAGNOSIS — Z98.890 S/P RIGHT ROTATOR CUFF REPAIR: Primary | ICD-10-CM

## 2022-08-25 DIAGNOSIS — Z47.89 ORTHOPEDIC AFTERCARE: ICD-10-CM

## 2022-08-25 DIAGNOSIS — Z74.09 IMPAIRED MOBILITY AND ADLS: ICD-10-CM

## 2022-08-25 DIAGNOSIS — M25.611 STIFFNESS OF JOINT, SHOULDER REGION, RIGHT: ICD-10-CM

## 2022-08-25 DIAGNOSIS — Z78.9 IMPAIRED MOBILITY AND ADLS: ICD-10-CM

## 2022-08-25 PROCEDURE — 97110 THERAPEUTIC EXERCISES: CPT | Performed by: PHYSICAL THERAPIST

## 2022-08-25 PROCEDURE — 97140 MANUAL THERAPY 1/> REGIONS: CPT | Performed by: PHYSICAL THERAPIST

## 2022-08-25 NOTE — PROGRESS NOTES
Physical Therapy Daily Treatment Note      Patient: Elvis Vallejo   : 1975  Referring practitioner: Maddi Alexander MD  Date of Initial Visit: Type: THERAPY  Noted: 2022  Today's Date: 2022  Patient seen for 25 sessions       Visit Diagnoses:    ICD-10-CM ICD-9-CM   1. S/P right rotator cuff repair  Z98.890 V45.89   2. Orthopedic aftercare  Z47.89 V54.9   3. Impaired mobility and ADLs  Z74.09 V49.89    Z78.9    4. Stiffness of joint, shoulder region, right  M25.611 719.51     Subjective Doing pretty well.  I go to see the doctor in about 2 weeks to determine if I can be released for work duty.  Overall, just some soreness in the lats from the deep muscle work from last time.     Objective   See Exercise, Manual, and Modality Logs for complete treatment.     Assessment/Plan  Pt tolerated treatment well today and Scaption plane motion approaching WFL.  He now reaches overhead comfortably with resistance.      Progress per Plan of Care and Progress strengthening /stabilization /functional activity    Timed:         Manual Therapy:    15     mins  85904;     Therapeutic Exercise:    40     mins  77093;     Neuromuscular Thao:    -    mins  58799;    Therapeutic Activity:     -     mins  89692;     Gait Training:      -     mins  41626;     Ultrasound:     --     mins  90225;    Electrical Stimulation:    -     mins  18695 ( );  Dry Needling     -     mins self-pay  Traction     -     mins 43051      Timed Treatment:   55   mins   Total Treatment:     69   mins    THEO Redman License: [id number]

## 2022-08-29 ENCOUNTER — TREATMENT (OUTPATIENT)
Dept: PHYSICAL THERAPY | Facility: CLINIC | Age: 47
End: 2022-08-29

## 2022-08-29 DIAGNOSIS — Z98.890 S/P RIGHT ROTATOR CUFF REPAIR: Primary | ICD-10-CM

## 2022-08-29 DIAGNOSIS — Z47.89 ORTHOPEDIC AFTERCARE: ICD-10-CM

## 2022-08-29 DIAGNOSIS — Z74.09 IMPAIRED MOBILITY AND ADLS: ICD-10-CM

## 2022-08-29 DIAGNOSIS — Z78.9 IMPAIRED MOBILITY AND ADLS: ICD-10-CM

## 2022-08-29 DIAGNOSIS — M25.611 STIFFNESS OF JOINT, SHOULDER REGION, RIGHT: ICD-10-CM

## 2022-08-29 PROCEDURE — 97110 THERAPEUTIC EXERCISES: CPT | Performed by: PHYSICAL THERAPIST

## 2022-08-29 PROCEDURE — 97140 MANUAL THERAPY 1/> REGIONS: CPT | Performed by: PHYSICAL THERAPIST

## 2022-08-29 NOTE — PROGRESS NOTES
Physical Therapy Daily Treatment Note      Patient: Elvis Vallejo   : 1975  Referring practitioner: Sabrina Alexander MD  Date of Initial Visit: Type: THERAPY  Noted: 2022  Today's Date: 2022  Patient seen for 26 sessions       Visit Diagnoses:    ICD-10-CM ICD-9-CM   1. S/P right rotator cuff repair  Z98.890 V45.89   2. Orthopedic aftercare  Z47.89 V54.9   3. Impaired mobility and ADLs  Z74.09 V49.89    Z78.9    4. Stiffness of joint, shoulder region, right  M25.611 719.51     Subjective No new complaints today.  I still have stiffness but really no pain unless I really stretch it.     Objective :   See Exercise, Manual, and Modality Logs for complete treatment.     Assessment/Plan  PT tolerated treatment well but his AROM is still lagging behind PROM in standing positions.  He was provided with shoulder IR PROM stretches to continue at home.      Progress per Plan of Care and Progress strengthening /stabilization /functional activity    Timed:         Manual Therapy:    15     mins  45088;     Therapeutic Exercise:    40     mins  14506;     Neuromuscular Thao:    -    mins  92123;    Therapeutic Activity:     -     mins  45820;     Gait Training:      -     mins  00180;     Ultrasound:     -     mins  02447;    Electrical Stimulation:    --     mins  16931 ( );  Dry Needling     -     mins self-pay  Traction     -     mins 61952      Timed Treatment:   55   mins   Total Treatment:     68   mins    THEO Redman License: [id number]

## 2022-09-01 ENCOUNTER — TREATMENT (OUTPATIENT)
Dept: PHYSICAL THERAPY | Facility: CLINIC | Age: 47
End: 2022-09-01

## 2022-09-01 DIAGNOSIS — Z47.89 ORTHOPEDIC AFTERCARE: ICD-10-CM

## 2022-09-01 DIAGNOSIS — Z74.09 IMPAIRED MOBILITY AND ADLS: ICD-10-CM

## 2022-09-01 DIAGNOSIS — Z98.890 S/P RIGHT ROTATOR CUFF REPAIR: Primary | ICD-10-CM

## 2022-09-01 DIAGNOSIS — Z78.9 IMPAIRED MOBILITY AND ADLS: ICD-10-CM

## 2022-09-01 PROCEDURE — 97110 THERAPEUTIC EXERCISES: CPT | Performed by: PHYSICAL THERAPIST

## 2022-09-01 PROCEDURE — 97140 MANUAL THERAPY 1/> REGIONS: CPT | Performed by: PHYSICAL THERAPIST

## 2022-09-01 NOTE — PROGRESS NOTES
Physical Therapy Daily Treatment Note      Patient: Elvis Vallejo   : 1975  Referring practitioner: Sabrina Alexander MD  Date of Initial Visit: Type: THERAPY  Noted: 2022  Today's Date: 2022  Patient seen for 27 sessions       Visit Diagnoses:    ICD-10-CM ICD-9-CM   1. S/P right rotator cuff repair  Z98.890 V45.89   2. Orthopedic aftercare  Z47.89 V54.9   3. Impaired mobility and ADLs  Z74.09 V49.89    Z78.9      Subjective   Nothing new to report today vs status at last session.  I will see the MD next 22.    Objective   See Exercise, Manual, and Modality Logs for complete treatment.   Trial myofascial decompression to (R) pectoral insertion/anterior/superior aspect of (R) shoulder both static tissue and dynamic tissue techniques.     Assessment/Plan    Tolerated continued manual therapy and progression of therapeutic exercise well today, no increased pain reported during or after exercises.   Will follow up with PT  before MD appt .    Progress per Plan of Care and Progress strengthening /stabilization /functional activity    Timed:         Manual Therapy:    15     mins  39146;     Therapeutic Exercise:    40     mins  59125;     Neuromuscular Thao:    -    mins  20221;    Therapeutic Activity:     -     mins  85326;     Gait Training:      -     mins  72666;     Ultrasound:     -     mins  81230;    Electrical Stimulation:    --     mins  08432 ( );  Dry Needling     -     mins self-pay  Traction     -     mins 90958      Timed Treatment:   55   mins   Total Treatment:     66   mins      JERILYN Kwong License #[id number]  Physical Therapist Assistant

## 2022-09-06 ENCOUNTER — TREATMENT (OUTPATIENT)
Dept: PHYSICAL THERAPY | Facility: CLINIC | Age: 47
End: 2022-09-06

## 2022-09-06 DIAGNOSIS — M25.611 STIFFNESS OF JOINT, SHOULDER REGION, RIGHT: ICD-10-CM

## 2022-09-06 DIAGNOSIS — Z78.9 IMPAIRED MOBILITY AND ADLS: ICD-10-CM

## 2022-09-06 DIAGNOSIS — Z47.89 ORTHOPEDIC AFTERCARE: ICD-10-CM

## 2022-09-06 DIAGNOSIS — Z74.09 IMPAIRED MOBILITY AND ADLS: ICD-10-CM

## 2022-09-06 DIAGNOSIS — Z98.890 S/P RIGHT ROTATOR CUFF REPAIR: Primary | ICD-10-CM

## 2022-09-06 PROCEDURE — 97110 THERAPEUTIC EXERCISES: CPT | Performed by: PHYSICAL THERAPIST

## 2022-09-06 PROCEDURE — 97530 THERAPEUTIC ACTIVITIES: CPT | Performed by: PHYSICAL THERAPIST

## 2022-09-06 NOTE — PROGRESS NOTES
Physical Therapy Progress Note      Patient: Elvis Vallejo   : 1975  Referring practitioner: Sabrina Alexander MD  Date of Initial Visit: Type: THERAPY  Noted: 2022  Today's Date: 2022  Patient seen for 28 sessions       Visit Diagnoses:    ICD-10-CM ICD-9-CM   1. S/P right rotator cuff repair  Z98.890 V45.89   2. Orthopedic aftercare  Z47.89 V54.9   3. Impaired mobility and ADLs  Z74.09 V49.89    Z78.9    4. Stiffness of joint, shoulder region, right  M25.611 719.51     Subjective I don't have any pain, only when I stretch.  Pain is 0/10.  Shoulder doesn't really hurt at all, even stretching just feels tight and maybe sore.      Objective:                    Evaluation vs  (2022) vs. (2022) Vs (CURRENT)  Active ROM   R shoulder:   Flexion:  125 deg in standing ( 135 deg) ,   Scaption:  105 deg in standing ( 108 deg),       Passive Range of Motion      Right Shoulder   Flexion: 95 degrees with pain (138 deg, very shaky to take through range, empty end feel)  ( 170 deg),  CURRENT:  175 DEG.  Adduction: 50 degrees with pain ( 110 deg), ( 128 deg), ( 180 IN SUPINE)  External rotation 45°: 0 degrees with pain ( 43 deg ) (At 90 deg abd:  40 deg), (AT 90 DEG ABD: 75 DEG)   Internal rotation 45°: 60 degrees (at 90 deg abd:  55 deg), (AT 90 DEG ABD:  85 DEG, MILD SUBSTITUTION)        ACTIVE Range of Motion      Right Shoulder   Flexion: (155 DEG in standing)  Scaption:  (150 DEG in stanging)  External rotation: (Functional Reach:  T2 level )  Internal rotation: ( Functional Reach: T 10 level, lateral to spine on R.)     Strength/Myotome Testing     Left Shoulder   Normal muscle strength     Left Elbow   Normal strength     Right Elbow   Flexion: 3+ (4+/5) (5/5)   Extension: 3+ (4-/5) (5/5)   Forearm supination: 3+ ( 5-/5) (5/5)   Forearm pronation: 3+ (5-/5) (5/5)      R Shoulder (with shoulder at side, elbow flexed, Not tested at evaluation)   Flexion: 4/5 (at 90 deg flexion:  4+/5), (AT 90 DEG  FLEX: 5/5)  Abd:  4/5 (at 90 deg abd: 4+/5), (AT 90 DEG ABD:  5/5)  Ext:  4+/5 (5-/5), (5/5)   ER: 4/5 (4+/5 at neutral ), (5-/5 AT NEUTRAL)  IR: 4+/5  (5-/5 at neutral), (5/5 with mild mm shake)    See Exercise, Manual, and Modality Logs for complete treatment.     Assessment/Plan  Elvis has done well overall and his PROM is WNL but AROM still lags for OH and functional IR directions.  He possess functional strength for all planes but would benefit from continued HEP for another 6-8 weeks for solidifying strength but ongoing skilled PT is not required for further progression.  We will follow up for one additional visit to ensure understanding of HEP expectations and progressions prior to DC.      Anticipate DC next Visit    Timed:         Manual Therapy:    -     mins  96368;     Therapeutic Exercise:    40     mins  92056;     Neuromuscular Thao:    -    mins  60203;    Therapeutic Activity:     15     mins  85191;   Tests and measures and pt education.    Gait Training:      -     mins  73185;     Ultrasound:     -     mins  82061;    Electrical Stimulation:    -     mins  47834 ( );  Dry Needling     -     mins self-pay  Traction     -     mins 98104      Timed Treatment:   55   mins   Total Treatment:     65   mins    THEO Redman License: [id number]

## 2022-09-08 ENCOUNTER — OFFICE VISIT (OUTPATIENT)
Dept: ORTHOPEDIC SURGERY | Facility: CLINIC | Age: 47
End: 2022-09-08

## 2022-09-08 VITALS — HEIGHT: 69 IN | BODY MASS INDEX: 33.09 KG/M2 | TEMPERATURE: 97.3 F | WEIGHT: 223.4 LBS

## 2022-09-08 DIAGNOSIS — Z98.890 S/P ROTATOR CUFF REPAIR: Primary | ICD-10-CM

## 2022-09-08 PROCEDURE — 99213 OFFICE O/P EST LOW 20 MIN: CPT | Performed by: ORTHOPAEDIC SURGERY

## 2022-09-08 NOTE — PROGRESS NOTES
"Right Shoulder Scope RCR follow Up       Patient: Elvis Vallejo        YOB: 1975      Chief Complaints: right shoulder pain      History of Present Illness: Pt is here f/u shoulder arthroscopy, RCR he is 4 months out doing great has no complaints he states he is doing everything he wants to do he is anxious to get back to work.  He is working but desk job only.  He is a  he states he really does not have to do any lifting he will have to be able to shoot his firearm and he has not really done that yet he does have to get permission to go to the      Allergies:   Allergies   Allergen Reactions   • Peanut Oil Other (See Comments)     Peanut Dust- Eyes swelled, runny nose, sore throat       Medications:   Home Medications:  Current Outpatient Medications on File Prior to Visit   Medication Sig   • atorvastatin (LIPITOR) 20 MG tablet Take 20 mg by mouth Every Morning.   • dexlansoprazole (DEXILANT) 60 MG capsule Take 60 mg by mouth Daily.   • lisinopril-hydrochlorothiazide (PRINZIDE,ZESTORETIC) 20-12.5 MG per tablet Take 1 tablet by mouth Every Morning.   • meloxicam (MOBIC) 15 MG tablet TAKE ONE TABLET BY MOUTH DAILY WITH FOOD   • montelukast (SINGULAIR) 10 MG tablet Take 10 mg by mouth Every Morning.   • multivitamin with minerals tablet tablet Take 1 tablet by mouth Daily.   • loratadine (CLARITIN) 10 MG tablet Take 10 mg by mouth Every Morning.     No current facility-administered medications on file prior to visit.     Current Medications:  Scheduled Meds:  Continuous Infusions:No current facility-administered medications for this visit.    PRN Meds:.          Physical Exam: 47 y.o. male  General Appearance:    Alert, cooperative, in no acute distress                   Vitals:    09/08/22 0806   Temp: 97.3 °F (36.3 °C)   Weight: 101 kg (223 lb 6.4 oz)   Height: 175.3 cm (69\")   PainSc: 0-No pain      Patient is alert and oriented ×3 no acute distress normal mood physical exam.  Physical exam " of the shoulder, incisions looked good there is no erythema,no signs or sx of infection.    Physical exam of the right shoulder reveals no overlying skin changes no lymphedema no lymphadenopathy.  The patient can actively flex to 180, abduction is similar external rotation is to 50, internal rotation to the upper lumbar spine.  Rotator cuff strength is 4+ to 5 over 5 with isometric strength testing without symptoms.  The patient has good cervical range of motion full and asymptomatic no radicular symptoms and a normal elbow exam.  Patient has good distal pulses  Assessment  S/P shoulder scope, rotator cuff repair, I think is doing great I think he can return to work I still want him to self limit as far as lifting he does except the fact that in the unlikely event that he has to alexis about air taste take somebody down that his cuff is at risk but he states he feels like he can do everything he needs to do I do it safely for himself and his partners.  I will give him permission also to do firearms training and I did ask him to go to the shooting range this weekend him work on not only shooting but getting his gun out quickly I will see him back in 4 to 6weeks we will put him at [facility] at that time

## 2022-10-11 NOTE — PROGRESS NOTES
Right Shoulder Scope RCR follow Up       Patient: Elvis Vallejo        YOB: 1975      Chief Complaints:Right  shoulder pain      History of Present Illness: Pt is here f/u shoulder arthroscopy, RCR he is about 6 months out rotator cuff tear he states he doing great he has occasional achiness this to work and last little bit of range of motion but overall he is very happy and much improved from his preop status  Allergies:   Allergies   Allergen Reactions   • Peanut Oil Other (See Comments)     Peanut Dust- Eyes swelled, runny nose, sore throat       Medications:   Home Medications:  Current Outpatient Medications on File Prior to Visit   Medication Sig   • atorvastatin (LIPITOR) 20 MG tablet Take 20 mg by mouth Every Morning.   • dexlansoprazole (DEXILANT) 60 MG capsule Take 60 mg by mouth Daily.   • lisinopril-hydrochlorothiazide (PRINZIDE,ZESTORETIC) 20-12.5 MG per tablet Take 1 tablet by mouth Every Morning.   • loratadine (CLARITIN) 10 MG tablet Take 10 mg by mouth Every Morning.   • meloxicam (MOBIC) 15 MG tablet TAKE ONE TABLET BY MOUTH DAILY WITH FOOD   • montelukast (SINGULAIR) 10 MG tablet Take 10 mg by mouth Every Morning.   • multivitamin with minerals tablet tablet Take 1 tablet by mouth Daily.     No current facility-administered medications on file prior to visit.     Current Medications:  Scheduled Meds:  Continuous Infusions:No current facility-administered medications for this visit.    PRN Meds:.          Physical Exam: 47 y.o. male  General Appearance:    Alert, cooperative, in no acute distress                 There were no vitals filed for this visit.   Patient is alert and oriented ×3 no acute distress normal mood physical exam.  Physical exam of the shoulder, incisions looked good there is no erythema,no signs or sx of infection.  Physical exam of the right shoulder reveals no overlying skin changes no lymphedema no lymphadenopathy.  Patient has active flexion 180 with mild  symptoms abduction is similar external rotation is to 50 and internal rotation to the upper lumbar spine with mild symptoms.  Patient has good rotator cuff strength 5 e over 5 with isometric strength testing with pain.  Patient has a positive impingement and a positive Hernadez sign.  Patient has good cervical range of motion which is full and asymptomatic no radicular symptoms.  Patient has a normal elbow exam.  Good distal pulses are present  c    Assessment  S/P shoulder scope, rotator cuff repair, I think he is doing great I am going to inject him today just to try to give it over that last little comp I think he is doing outstanding otherwise as long as he is doing well he can follow-up as needed  Large Joint Arthrocentesis: R subacromial bursa  Date/Time: 10/13/2022 9:11 AM  Consent given by: patient  Site marked: site marked  Timeout: Immediately prior to procedure a time out was called to verify the correct patient, procedure, equipment, support staff and site/side marked as required   Supporting Documentation  Indications: pain   Procedure Details  Location: shoulder - R subacromial bursa  Preparation: Patient was prepped and draped in the usual sterile fashion  Needle gauge: 21G.  Approach: posterior  Medications administered: 80 mg methylPREDNISolone acetate 80 MG/ML; 2 mL lidocaine PF 1% 1 %  Patient tolerance: patient tolerated the procedure well with no immediate complications

## 2022-10-13 ENCOUNTER — OFFICE VISIT (OUTPATIENT)
Dept: ORTHOPEDIC SURGERY | Facility: CLINIC | Age: 47
End: 2022-10-13

## 2022-10-13 VITALS — WEIGHT: 223 LBS | TEMPERATURE: 97.6 F | HEIGHT: 69 IN | BODY MASS INDEX: 33.03 KG/M2

## 2022-10-13 DIAGNOSIS — Z98.890 S/P ROTATOR CUFF REPAIR: Primary | ICD-10-CM

## 2022-10-13 PROCEDURE — 20610 DRAIN/INJ JOINT/BURSA W/O US: CPT | Performed by: ORTHOPAEDIC SURGERY

## 2022-10-13 PROCEDURE — 99212 OFFICE O/P EST SF 10 MIN: CPT | Performed by: ORTHOPAEDIC SURGERY

## 2022-10-13 RX ORDER — METHYLPREDNISOLONE ACETATE 80 MG/ML
80 INJECTION, SUSPENSION INTRA-ARTICULAR; INTRALESIONAL; INTRAMUSCULAR; SOFT TISSUE
Status: COMPLETED | OUTPATIENT
Start: 2022-10-13 | End: 2022-10-13

## 2022-10-13 RX ORDER — LIDOCAINE HYDROCHLORIDE 10 MG/ML
2 INJECTION, SOLUTION EPIDURAL; INFILTRATION; INTRACAUDAL; PERINEURAL
Status: COMPLETED | OUTPATIENT
Start: 2022-10-13 | End: 2022-10-13

## 2022-10-13 RX ADMIN — LIDOCAINE HYDROCHLORIDE 2 ML: 10 INJECTION, SOLUTION EPIDURAL; INFILTRATION; INTRACAUDAL; PERINEURAL at 09:11

## 2022-10-13 RX ADMIN — METHYLPREDNISOLONE ACETATE 80 MG: 80 INJECTION, SUSPENSION INTRA-ARTICULAR; INTRALESIONAL; INTRAMUSCULAR; SOFT TISSUE at 09:11

## 2022-10-17 ENCOUNTER — TELEPHONE (OUTPATIENT)
Dept: ORTHOPEDIC SURGERY | Facility: CLINIC | Age: 47
End: 2022-10-17

## 2022-10-17 NOTE — TELEPHONE ENCOUNTER
The [facility] received a fax that requires your attention. The document has been indexed to the patient’s chart for your review.      Reason for sending: RECEIVED MMI FORMS FROM PATIENT'S  AT [facility] THAT NEED TO BE FILLED OUT BY PROVIDER    Documents Description: MMI FORM_[facility]_10/14/2022    Name of Sender: ARY SNIDER RN    Date Indexed: 10/14/2022

## 2024-01-01 NOTE — PROGRESS NOTES
Physical Therapy Daily Progress Note  Visit # 3           Patient: Elvis Vallejo   : 1975  Diagnosis/ICD-10 Code:  S/P right rotator cuff repair [Z98.890]  Referring practitioner: Sabrina Alexander MD  Date of Initial Evaluation:  Type: THERAPY  Noted: 2022      Subjective  Elvis Vallejo reports:   Rates pain 5/10 at present, managing with Advil PRN. No significant changes from last visit through today.     Objective   See Exercise, Manual, and Modality Logs for complete treatment.   Reviewed current HEP, added self PROM in pain free range to HEP.  Concluded session with CP to (R) shoulder x10 min.     Assessment/Plan  Tolerated manual therapy and therapeutic exercise well today, no increased pain reported during or after session.  Demos good understanding of condition and recovery process.         Progress per Plan of Care and Progress strengthening /stabilization /functional activity         Timed:         Manual Therapy:     18    mins  43583     Therapeutic Exercise:     15    mins  75366     Neuromuscular Thao:        mins  93811    Therapeutic Activity:          mins  23564     Gait Training:           mins  68773     Ultrasound:          mins  09883    Ionto                                   mins  24724  Self Care                            mins  79248    Un-Timed:  Electrical Stimulation:         mins 59883 ( )  Traction          mins 27769    Timed Treatment:   33   mins   Total Treatment:     48   mins    JERILYN Kwong License #[id number]  Physical Therapist Assistant        Anesthesia confirms case reviewed for anesthesia risk alert.

## 2024-01-30 ENCOUNTER — OFFICE VISIT (OUTPATIENT)
Dept: ORTHOPEDIC SURGERY | Facility: CLINIC | Age: 49
End: 2024-01-30
Payer: COMMERCIAL

## 2024-01-30 VITALS — WEIGHT: 222 LBS | BODY MASS INDEX: 32.88 KG/M2 | HEIGHT: 69 IN

## 2024-01-30 DIAGNOSIS — Z98.890 S/P ROTATOR CUFF REPAIR: ICD-10-CM

## 2024-01-30 DIAGNOSIS — M75.41 IMPINGEMENT SYNDROME OF RIGHT SHOULDER: ICD-10-CM

## 2024-01-30 PROCEDURE — 99214 OFFICE O/P EST MOD 30 MIN: CPT | Performed by: ORTHOPAEDIC SURGERY

## 2024-01-30 PROCEDURE — 73030 X-RAY EXAM OF SHOULDER: CPT | Performed by: ORTHOPAEDIC SURGERY

## 2024-01-30 PROCEDURE — 20610 DRAIN/INJ JOINT/BURSA W/O US: CPT | Performed by: ORTHOPAEDIC SURGERY

## 2024-01-30 RX ORDER — LIDOCAINE HYDROCHLORIDE 10 MG/ML
2 INJECTION, SOLUTION EPIDURAL; INFILTRATION; INTRACAUDAL; PERINEURAL
Status: COMPLETED | OUTPATIENT
Start: 2024-01-30 | End: 2024-01-30

## 2024-01-30 RX ORDER — METHYLPREDNISOLONE ACETATE 80 MG/ML
80 INJECTION, SUSPENSION INTRA-ARTICULAR; INTRALESIONAL; INTRAMUSCULAR; SOFT TISSUE
Status: COMPLETED | OUTPATIENT
Start: 2024-01-30 | End: 2024-01-30

## 2024-01-30 RX ORDER — MELOXICAM 15 MG/1
TABLET ORAL
Qty: 30 TABLET | Refills: 0 | Status: SHIPPED | OUTPATIENT
Start: 2024-01-30

## 2024-01-30 RX ADMIN — LIDOCAINE HYDROCHLORIDE 2 ML: 10 INJECTION, SOLUTION EPIDURAL; INFILTRATION; INTRACAUDAL; PERINEURAL at 15:05

## 2024-01-30 RX ADMIN — METHYLPREDNISOLONE ACETATE 80 MG: 80 INJECTION, SUSPENSION INTRA-ARTICULAR; INTRALESIONAL; INTRAMUSCULAR; SOFT TISSUE at 15:05

## 2024-01-30 NOTE — PROGRESS NOTES
Patient: Elvis Vallejo  YOB: 1975  Date of Service: 1/30/2024    Chief Complaints: Right shoulder pain    Subjective:    History of Present Illness: Pt is seen in the office today with complaints of right shoulder pain he is status post rotator cuff repair April 2022 he was doing great until about 2 weeks ago he denies any history injury change in activity but just started having some shoulder pain states is not severe but it is a bit annoying it is bothering him some at night his previous injury was a work comp apparently this is not.  His past medical history as listed below and reviewed by me.         Allergies:   Allergies   Allergen Reactions    Peanut Oil Other (See Comments)     Peanut Dust- Eyes swelled, runny nose, sore throat       Medications:   Home Medications:  Current Outpatient Medications on File Prior to Visit   Medication Sig    atorvastatin (LIPITOR) 20 MG tablet Take 20 mg by mouth Every Morning.    clotrimazole (LOTRIMIN) 1 % external solution Apply  topically to the appropriate area as directed 2 (Two) Times a Day.    dexlansoprazole (DEXILANT) 60 MG capsule Take 60 mg by mouth Daily.    lisinopril-hydrochlorothiazide (PRINZIDE,ZESTORETIC) 20-12.5 MG per tablet Take 1 tablet by mouth Every Morning.    loratadine (CLARITIN) 10 MG tablet Take 10 mg by mouth Every Morning.    meloxicam (MOBIC) 15 MG tablet TAKE ONE TABLET BY MOUTH DAILY WITH FOOD    montelukast (SINGULAIR) 10 MG tablet Take 10 mg by mouth Every Morning.    multivitamin with minerals tablet tablet Take 1 tablet by mouth Daily.     No current facility-administered medications on file prior to visit.     Current Medications:  Scheduled Meds:  Continuous Infusions:No current facility-administered medications for this visit.    PRN Meds:.    I have reviewed the patient's medical history in detail and updated the computerized patient record.  Review and summarization of old records include:    Past Medical History:    Diagnosis Date    Allergic     Anesthesia complication     urinary retention    Guillermo esophagus     Guillermo's esophagus     GERD (gastroesophageal reflux disease)     Hyperlipidemia     Hypertension         Past Surgical History:   Procedure Laterality Date    COLONOSCOPY N/A 2017    Procedure: COLONOSCOPY with polypectomy ;  Surgeon: Tricia Addison MD;  Location: [facility] OR;  Service:     COLONOSCOPY N/A 2020    Procedure: COLONOSCOPY with polypectomy;  Surgeon: Gloria Hoffman MD;  Location: [facility] OR;  Service: Gastroenterology;  Laterality: N/A;  ascending colon polyp  rectal polyps x2  diverticulosis  hemorrhoids    ENDOSCOPY N/A 10/27/2016    Procedure: ESOPHAGOGASTRODUODENOSCOPY WITH BILE COLLECTION / RANDOM GASTRIC BIOPSIES,DUODENAL AND DISTAL ESOPHAGEAL BIOPSIES  AND ALFREDO TEST BIOPSY;  Surgeon: Tricia Addison MD;  Location: [facility] OR;  Service:     ENDOSCOPY N/A 2017    Procedure: ESOPHAGOGASTRODUODENOSCOPY with biopsies, stomach biopsy for  ALFREDO test;  Surgeon: Tricia Addison MD;  Location: [facility] OR;  Service:     ENDOSCOPY N/A 2020    Procedure: ESOPHAGOGASTRODUODENOSCOPY with biopsies;  Surgeon: Gloria Hoffman MD;  Location: [facility];  Service: Gastroenterology;  Laterality: N/A;  gastric biopsy  distal esophagus biopsy  gastritis  hiatal hernia  Guillermo's esophagus    ENDOSCOPY      HEMORRHOIDECTOMY      SHOULDER ARTHROSCOPY W/ ROTATOR CUFF REPAIR Right 2022    Procedure: SHOULDER ARTHROSCOPY WITH ROTATOR CUFF REPAIR, decompression, distal clavicle excision ;  Surgeon: Sabrina Alexander MD;  Location: [facility];  Service: Orthopedics;  Laterality: Right;    WISDOM TOOTH EXTRACTION          Social History     Occupational History    Not on file   Tobacco Use    Smoking status: Former     Types: Cigars     Quit date:      Years since quittin.0    Smokeless tobacco: Never    Tobacco comments:     only used 1-2 times a year   Vaping Use    Vaping Use:  Never used   Substance and Sexual Activity    Alcohol use: Yes     Alcohol/week: 3.0 standard drinks of alcohol     Types: 3 Cans of beer per week    Drug use: No    Sexual activity: Defer      Social History     Social History Narrative    Not on file        Family History   Problem Relation Age of Onset    Heart attack Paternal Grandmother     Hypertension Paternal Grandfather     Heart attack Paternal Grandfather     Colon cancer Neg Hx     Colon polyps Neg Hx     Malig Hyperthermia Neg Hx        ROS: 14 point review of systems was performed and was negative except for documented findings in HPI and today's encounter.     Allergies:   Allergies   Allergen Reactions    Peanut Oil Other (See Comments)     Peanut Dust- Eyes swelled, runny nose, sore throat     Constitutional:  Denies fever, shaking or chills   Eyes:  Denies change in visual acuity   HENT:  Denies nasal congestion or sore throat   Respiratory:  Denies cough or shortness of breath   Cardiovascular:  Denies chest pain or severe LE edema   GI:  Denies abdominal pain, nausea, vomiting, bloody stools or diarrhea   Musculoskeletal:  Numbness, tingling, or loss of motor function only as noted above in history of present illness.  : Denies painful urination or hematuria  Integument:  Denies rash, lesion or ulceration   Neurologic:  Denies headache or focal weakness  Endocrine:  Denies lymphadenopathy  Psych:  Denies confusion or change in mental status   Hem:  Denies active bleeding      Physical Exam: 48 y.o. male  Wt Readings from Last 3 Encounters:   10/28/23 104 kg (229 lb 4.5 oz)   10/21/23 104 kg (229 lb)   10/13/22 101 kg (223 lb)       There is no height or weight on file to calculate BMI.    There were no vitals filed for this visit.  Vital signs reviewed.   General Appearance:    Alert, cooperative, in no acute distress                    Ortho exam    Physical exam of the right shoulder reveals no overlying skin changes no lymphedema no  lymphadenopathy.  Patient has active flexion 180 with mild symptoms abduction is similar external rotation is to 50 and internal rotation to the upper lumbar spine with mild symptoms.  Patient has good rotator cuff strength 4+ over 5 with isometric strength testing with pain.  Patient has a positive impingement and a positive Hernadez sign.  Patient has good cervical range of motion which is full and asymptomatic no radicular symptoms.  Patient has a normal elbow exam.  Good distal pulses are present  Patient has pain with overhead activity and a positive Neer sign and a positive empty can sign , a positive drop arm and a definitive painful arc   He has a very rounded shoulder and forward head posture       X-rays AP scapular Y and x-ray lateral the right shoulder were taken to evaluate his symptoms and compared x-rays done 2022 he still has some degenerative changes acromioclavicular joint some changes at the insertion of the cuff right where we fix this but nothing that looks acute    Assessment: Right shoulder pain I think this is more impingement I do think it is rotator cuff but probably just more impingement I think a lot of it is related to his posture which we talked about I think an injection is quite reasonable I would like him to see physical therapy to again work on cuff and core strengthening and postural exercises.  I will also start him on some meloxicam with strict precautions for short period of time    Plan:   Follow up as indicated.  Ice, elevate, and rest as needed.  Discussed conservative measures of pain control including ice, bracing.   Sabrina Alexander M.D.    Large Joint Arthrocentesis: R subacromial bursa  Date/Time: 1/30/2024 3:05 PM  Consent given by: patient  Site marked: site marked  Timeout: Immediately prior to procedure a time out was called to verify the correct patient, procedure, equipment, support staff and site/side marked as required   Supporting Documentation  Indications: pain    Procedure Details  Location: shoulder - R subacromial bursa  Preparation: Patient was prepped and draped in the usual sterile fashion  Needle gauge: 21G.  Approach: posterior  Medications administered: 80 mg methylPREDNISolone acetate 80 MG/ML; 2 mL lidocaine PF 1% 1 %  Patient tolerance: patient tolerated the procedure well with no immediate complications

## 2024-02-07 ENCOUNTER — TREATMENT (OUTPATIENT)
Dept: PHYSICAL THERAPY | Facility: CLINIC | Age: 49
End: 2024-02-07
Payer: COMMERCIAL

## 2024-02-07 DIAGNOSIS — M25.511 CHRONIC PAIN IN RIGHT SHOULDER: Primary | ICD-10-CM

## 2024-02-07 DIAGNOSIS — M25.611 DECREASED RIGHT SHOULDER RANGE OF MOTION: ICD-10-CM

## 2024-02-07 DIAGNOSIS — G89.29 CHRONIC PAIN IN RIGHT SHOULDER: Primary | ICD-10-CM

## 2024-02-07 NOTE — PROGRESS NOTES
Physical Therapy Initial Evaluation and Plan of Care  [facility] [address], [address] [place] [id number]     Patient: Elvis Vallejo   : 1975  Referring practitioner: Sabrina Alexander MD  Date of Initial Visit: 2024  Today's Date: 2024  Patient seen for 1 sessions  PT Clinic location: [address], [address]  [address]          Visit Diagnoses:    ICD-10-CM ICD-9-CM   1. Chronic pain in right shoulder  M25.511 719.41    G89.29 338.29   2. Decreased right shoulder range of motion  M25.611 719.51       Subjective Questionnaire: QuickDASH: 18.18    Subjective Evaluation    History of Present Illness  Mechanism of injury: I had a rotator cuff repair about 2 years and had PT and everything seemed to be going well. About 2 weeks ago I started getting an achy feeling in the front and back of my right shoulder. I am having a difficult time reaching above my head and behind my back. It seems like the pain started and then I noticed some tightness in my shoulder.   The pain does not limit my sleeping.   I also had my collar bone shattered during the initial incident so I have some neck pain but not no numbness or tingling and the pain is localized to the shoulder joint.   Nothing really seems to make the pain worse or better, ice can help a little bit. I try to do some of my shoulder exercises every Wednesday, I work two jobs most days of the week.       Patient Occupation: [employer] Quality of life: excellent    Pain  Current pain rating: 3  At best pain rating: 3  At worst pain rating: 3  Quality: dull ache  Relieving factors: ice  Aggravating factors: lifting, keyboarding and overhead activity    Hand dominance: right    Treatments  Previous treatment: physical therapy  Patient Goals  Patient goals for therapy: decreased pain, increased strength, independence with ADLs/IADLs and return to sport/leisure  activities  Patient goal: I want to improve some of the ROM.       Medical history: High blood pressure and cholesterol. See chart for further detail.   Therapy Precautions: N/A      Objective          Static Posture     Head  Forward.    Shoulders  Rounded.    Scapulae  Left winging and right winging.    Thoracic Spine  Hyperkyphosis.    Palpation     Right   No palpable tenderness to the biceps, middle trapezius, pectoralis major, pectoralis minor and upper trapezius. Tenderness of the anterior deltoid, infraspinatus, levator scapulae, rhomboids, supraspinatus, teres major and teres minor.     Tenderness     Right Shoulder  Tenderness in the AC joint and clavicle.     Active Range of Motion   Left Shoulder   Flexion: 175 degrees   Abduction: 138 degrees   External rotation 0°: 55 degrees     Right Shoulder   Flexion: 155 degrees with pain  Adduction: 150 degrees with pain  External rotation 0°: 60 degrees with pain    Additional Active Range of Motion Details  Right IR behind the back: to lower thoracic spine    Joint Play     Right Shoulder  Hypomobile in the posterior capsule and inferior capsule.     Strength/Myotome Testing     Left Shoulder     Planes of Motion   Flexion: 4+   Abduction: 5   External rotation at 0°: 5   Internal rotation at 0°: 5     Right Shoulder     Planes of Motion   Flexion: 4+   Abduction: 4+   External rotation at 0°: 5   Internal rotation at 0°: 5     Tests   Cervical     Right   Negative ULTT1.     Right Shoulder   Negative drop arm, empty can, external rotation lag sign, full can, Hawkin's, lift-off, Neer's and painful arc.          Assessment & Plan       Assessment  Impairments: abnormal muscle firing, abnormal or restricted ROM, activity intolerance, impaired physical strength, lacks appropriate home exercise program and pain with function   Functional limitations: carrying objects, lifting, sleeping, pushing, uncomfortable because of pain, reaching behind back, reaching overhead  and unable to perform repetitive tasks   Assessment details: Pt is a 48 year old male who presents to PT with symptoms consistent with minimal subacromial impingement syndrome. Upon initial evaluation he presents with the following deficits and impairments: decreased right shoulder ROM, postural deficits, decreased thoracic mobility, and hypomobile GH glides. These impairments and deficits make it difficult to reach behind his back to tuck in his shirt and to reach over his head and out to the side. Pt would benefit from skilled PT intervention to address the deficits noted and to improve overall quality of life.   Prognosis: good    Goals  Plan Goals: SHORT TERM GOALS: 4 weeks  1. Patient to be compliant with HEP and no difficulty with sleeping  2. Increased (R) UE strength to 4+/5 with no pain> 3/10 to allow for household and work activities.   3. Pt to exhibit (R) shoulder active flexion / ABD to 160° in standing/sitting to assist with reaching overhead with less pain to wash hair and change shirt.  4. Pt demonstrates improved posture in sitting and standing for 30 mins with minimal-no cues during treatment session to help decrease the stress on the shoulders.      LONG TERM GOALS: 8 weeks  1. Pt score <10% perceived disability on Quick DASH.  2. Pt. to exhibit (R) shoulder AROM to WFL (> 165°) flex/abd. 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  3. Pt to exhibit 5/5 UE strength to allow for pushing/pulling and lifting >5 #to occur with pain <2/10 to help with household cleaning, grocery shopping and recreational activities.   4. Pt able to reach overhead and lift 10# (B) x 10 to allow for return to doing work around home and help improve ability to return to exercise without restriction.        Plan  Therapy options: will be seen for skilled therapy services  Planned modality interventions: cryotherapy, electrical  stimulation/Bruneian stimulation, TENS, thermotherapy (hydrocollator packs), ultrasound and dry needling  Planned therapy interventions: ADL retraining, flexibility, body mechanics training, home exercise program, functional ROM exercises, joint mobilization, manual therapy, neuromuscular re-education, postural training, soft tissue mobilization, spinal/joint mobilization, strengthening, stretching and therapeutic activities  Frequency: 2x week  Duration in weeks: 8  Treatment plan discussed with: patient        See flowsheets for treatment detail.  Education: Discussed underlying pathology and posture, HEP, and POC.    History # of Personal Factors and/or Comorbidities: LOW (0)  Examination of Body System(s): # of elements: LOW (1-2)  Clinical Presentation: STABLE   Clinical Decision Making: LOW       Timed:         Manual Therapy:    -     mins  88916;     Therapeutic Exercise:    25     mins  80492;     Neuromuscular Thao:    -    mins  88522;    Therapeutic Activity:     -     mins  65203;     Gait Training:           mins  84057;     Ultrasound:          mins  46648;    Ionto                                   mins   73299  Self Care                       9     mins   14063      Un-Timed:  Electrical Stimulation:         mins  79979 ( );  Dry Needling          mins self-pay  Traction          mins 47788  Low Eval     20     Mins  10314  Mod Eval     -     Mins  50985  High Eval                       -     Mins  35480  Re-Eval                               mins  66159      Timed Treatment:   34   mins   Total Treatment:     54   mins    PT SIGNATURE: Iris Plasencia PT   [place] [facility] license #: [id number]  DATE TREATMENT INITIATED: 2/7/2024    Initial Certification  Certification Period: 5/6/2024  I certify that the therapy services are furnished while this patient is under my care.  The services outlined above are required by this patient, and will be reviewed every 90 days.    PHYSICIAN: Sabrina Alexander,  MD  NPI: [id number]                                      DATE:     Please sign and return via fax to [facility] - Fax #: [phone]. Thank you, [facility].

## 2024-02-16 ENCOUNTER — TREATMENT (OUTPATIENT)
Dept: PHYSICAL THERAPY | Facility: CLINIC | Age: 49
End: 2024-02-16
Payer: COMMERCIAL

## 2024-02-16 DIAGNOSIS — M25.611 DECREASED RIGHT SHOULDER RANGE OF MOTION: ICD-10-CM

## 2024-02-16 DIAGNOSIS — M25.511 CHRONIC PAIN IN RIGHT SHOULDER: Primary | ICD-10-CM

## 2024-02-16 DIAGNOSIS — G89.29 CHRONIC PAIN IN RIGHT SHOULDER: Primary | ICD-10-CM

## 2024-02-23 ENCOUNTER — TREATMENT (OUTPATIENT)
Dept: PHYSICAL THERAPY | Facility: CLINIC | Age: 49
End: 2024-02-23
Payer: COMMERCIAL

## 2024-02-23 DIAGNOSIS — M25.611 DECREASED RIGHT SHOULDER RANGE OF MOTION: ICD-10-CM

## 2024-02-23 DIAGNOSIS — M25.511 CHRONIC PAIN IN RIGHT SHOULDER: Primary | ICD-10-CM

## 2024-02-23 DIAGNOSIS — G89.29 CHRONIC PAIN IN RIGHT SHOULDER: Primary | ICD-10-CM

## 2024-02-23 NOTE — PROGRESS NOTES
Physical Therapy Daily Treatment Note  [facility] [place]  [address], [address]  [place] [id number]     Patient: Elvis Vallejo  : 1975  Referring practitioner: Sabrina Alexander MD  Today's Date: 2024    VISIT#: 3    Visit Diagnoses:    ICD-10-CM ICD-9-CM   1. Chronic pain in right shoulder  M25.511 719.41    G89.29 338.29   2. Decreased right shoulder range of motion  M25.611 719.51       Subjective   Elvis Vallejo reports: Patient reports that he is still a little sore. The banded exercises are going well but he does feel some soreness after them.       Objective       See Exercise, Manual, and Modality Logs for complete treatment.     Patient Education: HEP review  Exercise rationale/ pain free exercise performance  Alternate exercise positions  Verbal/Tactile cues to ensure correct exercise performance/technique       Assessment/Plan  Patient demonstrated good tolerance to new therapeutic exercises on this date. After providing posterior and inferior GH mobs he reported less of a pinch in hir right shoulder. With CKC shoulder taps patient reports instability and visible shaking was noted more around the elbow than in the shoulder. Verbal cues were provided for good posture throughout.       Progress per Plan of Care and Progress strengthening /stabilization /functional activity          Timed:         Manual Therapy:   8     mins  50230;     Therapeutic Exercise:    14     mins  40970;     Neuromuscular Thao:    10    mins  15260;    Therapeutic Activity:     -     mins  05147;     Gait Training:           mins  56860;     Ultrasound:          mins  29942;    Ionto:                                   mins  39188  Self Care:                       -     mins  59326    Un-Timed:  Electrical Stimulation:         mins  65744 ( );  Dry Needling          mins self-pay  Traction          mins 22032  Re-Eval                               mins  43166  Group  Therapy           ___ mins 78807    Timed Treatment:   32   mins   Total Treatment:     45   mins    Iris Plasencia PT  Physical Therapist  [facility] license #: [id number]

## 2024-02-27 RX ORDER — MELOXICAM 15 MG/1
TABLET ORAL
Qty: 30 TABLET | Refills: 0 | OUTPATIENT
Start: 2024-02-27

## 2024-02-27 NOTE — TELEPHONE ENCOUNTER
He is on lisinopril so we do not use these medications long-term in those patients.  If he feels like he needs it I would have him discuss this a bit with his PCP

## 2024-03-01 NOTE — PROGRESS NOTES
Patient: Elvis Vallejo  YOB: 1975  Date of Service: 3/1/2024    Chief Complaints: Right shoulder pain    Subjective:    History of Present Illness: Pt is seen in the office today with complaints of right shoulder pain he is status post rotator cuff repair 2 years ago he was doing well until a couple of months ago he has return of his symptoms.  He states it is more of an ache we have done injections done continued physical therapy and still has symptoms.  I am a bit concerned about the repair        Allergies:   Allergies   Allergen Reactions    Peanut Oil Other (See Comments)     Peanut Dust- Eyes swelled, runny nose, sore throat       Medications:   Home Medications:  Current Outpatient Medications on File Prior to Visit   Medication Sig    atorvastatin (LIPITOR) 20 MG tablet Take 1 tablet by mouth Every Morning.    clotrimazole (LOTRIMIN) 1 % external solution Apply  topically to the appropriate area as directed 2 (Two) Times a Day.    dexlansoprazole (DEXILANT) 60 MG capsule Take 1 capsule by mouth Daily.    lisinopril-hydrochlorothiazide (PRINZIDE,ZESTORETIC) 20-12.5 MG per tablet Take 1 tablet by mouth Every Morning.    loratadine (CLARITIN) 10 MG tablet Take 1 tablet by mouth Every Morning.    meloxicam (MOBIC) 15 MG tablet TAKE ONE TABLET BY MOUTH DAILY WITH FOOD    meloxicam (MOBIC) 15 MG tablet 1 PO Daily with food.    montelukast (SINGULAIR) 10 MG tablet Take 1 tablet by mouth Every Morning.    multivitamin with minerals tablet tablet Take 1 tablet by mouth Daily.     No current facility-administered medications on file prior to visit.     Current Medications:  Scheduled Meds:  Continuous Infusions:No current facility-administered medications for this visit.    PRN Meds:.    I have reviewed the patient's medical history in detail and updated the computerized patient record.  Review and summarization of old records include:    Past Medical History:   Diagnosis Date    Allergic      Anesthesia complication     urinary retention    Guillermo esophagus     Guillermo's esophagus     GERD (gastroesophageal reflux disease)     Hyperlipidemia     Hypertension     Nausea & vomiting 10/05/2016        Past Surgical History:   Procedure Laterality Date    COLONOSCOPY N/A 2017    Procedure: COLONOSCOPY with polypectomy ;  Surgeon: Tricia Addison MD;  Location: [facility] OR;  Service:     COLONOSCOPY N/A 2020    Procedure: COLONOSCOPY with polypectomy;  Surgeon: Gloria Hoffman MD;  Location: [facility] OR;  Service: Gastroenterology;  Laterality: N/A;  ascending colon polyp  rectal polyps x2  diverticulosis  hemorrhoids    ENDOSCOPY N/A 10/27/2016    Procedure: ESOPHAGOGASTRODUODENOSCOPY WITH BILE COLLECTION / RANDOM GASTRIC BIOPSIES,DUODENAL AND DISTAL ESOPHAGEAL BIOPSIES  AND ALFREDO TEST BIOPSY;  Surgeon: Tricia Addison MD;  Location: [facility] OR;  Service:     ENDOSCOPY N/A 2017    Procedure: ESOPHAGOGASTRODUODENOSCOPY with biopsies, stomach biopsy for  ALFREDO test;  Surgeon: Tricia Addison MD;  Location: [facility] OR;  Service:     ENDOSCOPY N/A 2020    Procedure: ESOPHAGOGASTRODUODENOSCOPY with biopsies;  Surgeon: Gloria Hoffman MD;  Location: [facility] OR;  Service: Gastroenterology;  Laterality: N/A;  gastric biopsy  distal esophagus biopsy  gastritis  hiatal hernia  Guillermo's esophagus    ENDOSCOPY      HEMORRHOIDECTOMY      SHOULDER ARTHROSCOPY W/ ROTATOR CUFF REPAIR Right 2022    Procedure: SHOULDER ARTHROSCOPY WITH ROTATOR CUFF REPAIR, decompression, distal clavicle excision ;  Surgeon: Sabrina Alexander MD;  Location: [facility];  Service: Orthopedics;  Laterality: Right;    WISDOM TOOTH EXTRACTION          Social History     Occupational History    Not on file   Tobacco Use    Smoking status: Former     Types: Cigars     Quit date:      Years since quittin.1    Smokeless tobacco: Never    Tobacco comments:     only used 1-2 times a year   Vaping Use    Vaping Use:  Never used   Substance and Sexual Activity    Alcohol use: Yes     Alcohol/week: 3.0 standard drinks of alcohol     Types: 3 Cans of beer per week    Drug use: No    Sexual activity: Defer      Social History     Social History Narrative    Not on file        Family History   Problem Relation Age of Onset    Heart attack Paternal Grandmother     Hypertension Paternal Grandfather     Heart attack Paternal Grandfather     Colon cancer Neg Hx     Colon polyps Neg Hx     Malig Hyperthermia Neg Hx        ROS: 14 point review of systems was performed and was negative except for documented findings in HPI and today's encounter.     Allergies:   Allergies   Allergen Reactions    Peanut Oil Other (See Comments)     Peanut Dust- Eyes swelled, runny nose, sore throat     Constitutional:  Denies fever, shaking or chills   Eyes:  Denies change in visual acuity   HENT:  Denies nasal congestion or sore throat   Respiratory:  Denies cough or shortness of breath   Cardiovascular:  Denies chest pain or severe LE edema   GI:  Denies abdominal pain, nausea, vomiting, bloody stools or diarrhea   Musculoskeletal:  Numbness, tingling, or loss of motor function only as noted above in history of present illness.  : Denies painful urination or hematuria  Integument:  Denies rash, lesion or ulceration   Neurologic:  Denies headache or focal weakness  Endocrine:  Denies lymphadenopathy  Psych:  Denies confusion or change in mental status   Hem:  Denies active bleeding      Physical Exam: 48 y.o. male  Wt Readings from Last 3 Encounters:   01/30/24 101 kg (222 lb)   10/28/23 104 kg (229 lb 4.5 oz)   10/21/23 104 kg (229 lb)       There is no height or weight on file to calculate BMI.    There were no vitals filed for this visit.  Vital signs reviewed.   General Appearance:    Alert, cooperative, in no acute distress                    Ortho exam      Physical exam of the right shoulder reveals no overlying skin changes no lymphedema no  lymphadenopathy.  Patient has active flexion 180 with mild symptoms abduction is similar external rotation is to 50 and internal rotation to the upper lumbar spine with mild symptoms.  Patient has good rotator cuff strength 4+ over 5 with isometric strength testing with pain.  Patient has a positive impingement and a positive Hernadez sign.  Patient has good cervical range of motion which is full and asymptomatic no radicular symptoms.  Patient has a normal elbow exam.  Good distal pulses are present  Patient has pain with overhead activity and a positive Neer sign and a positive empty can sign , a positive drop arm and a definitive painful arc            Assessment: Status post rotator cuff repair 2 years ago with return of his symptoms I am a bit concerned he is a little bit weaker on this side has not responded to conservative measures plan is to proceed with an MRI to evaluate the repair    Plan: Is as above  Follow up as indicated.  Ice, elevate, and rest as needed.  Discussed conservative measures of pain control including ice, bracing.    Sabrina Alexander M.D.

## 2024-03-04 ENCOUNTER — OFFICE VISIT (OUTPATIENT)
Dept: ORTHOPEDIC SURGERY | Facility: CLINIC | Age: 49
End: 2024-03-04
Payer: COMMERCIAL

## 2024-03-04 VITALS — BODY MASS INDEX: 34.87 KG/M2 | TEMPERATURE: 98.6 F | HEIGHT: 69 IN | WEIGHT: 235.4 LBS

## 2024-03-04 DIAGNOSIS — Z98.890 S/P ROTATOR CUFF REPAIR: Primary | ICD-10-CM

## 2024-03-04 PROCEDURE — 99213 OFFICE O/P EST LOW 20 MIN: CPT | Performed by: ORTHOPAEDIC SURGERY

## 2024-03-04 RX ORDER — IPRATROPIUM BROMIDE 42 UG/1
SPRAY, METERED NASAL
COMMUNITY
Start: 2024-02-14

## 2024-03-08 ENCOUNTER — TELEPHONE (OUTPATIENT)
Dept: ORTHOPEDIC SURGERY | Facility: CLINIC | Age: 49
End: 2024-03-08
Payer: COMMERCIAL

## 2024-03-08 NOTE — TELEPHONE ENCOUNTER
----- Message from Sabrina Alexander MD sent at 3/8/2024 10:53 AM EST -----  Please tell him he just has some fraying of his infraspinatus one of the muscles of the rotator cuff no big tear I still would treat this conservatively with injections and physical therapy I am happy to discuss further in follow-up

## 2024-03-13 NOTE — PROGRESS NOTES
Shoulder MRI Follow Up      Patient: Elvis Vallejo        YOB: 1975            Chief Complaints: Shoulder pain right      History of Present Illness: The patient is here follow-up of an MRI of the shoulder MRI shows an intact rotator cuff tear he has some interstitial changes in the infraspinatus but overall looks good      Physical Exam: 48 y.o. male  General Appearance:    Alert, cooperative, in no acute distress                 There were no vitals filed for this visit.     Patient is alert and read ×3 no acute distress appears her above-listed at height weight and age.  Affect is normal respiratory rate is normal unlabored. Heart rate regular rate rhythm, sclera, dentition and hearing are normal for the purpose of this exam.      Ortho Exam  Physical exam of the right shoulder reveals no overlying skin changes no lymphedema no lymphadenopathy.  Patient has active flexion 180 with mild symptoms abduction is similar external rotation is to 50 and internal rotation to the upper lumbar spine with mild symptoms.  Patient has good rotator cuff strength 4+ over 5 with isometric strength testing with pain.  Patient has a positive impingement and a positive Hernadez sign.  Patient has good cervical range of motion which is full and asymptomatic no radicular symptoms.  Patient has a normal elbow exam.  Good distal pulses are present  Patient has pain with overhead activity and a positive Neer sign and a positive empty can sign , a positive drop arm and a definitive painful arc     MRI Results: MRIs as above I have reviewed myself and I agree with the findings the results however do not mention a prior rotator cuff repair evidence of prior anchors but they are obviously present but again the cuff repair looks fine  Procedures      Assessment/Plan: Status post rotator cuff repair in 2022 with some symptoms I think his cuff looks fine I think he will benefit from further physical therapy he just started that he  wants to repeat a repeat injection we can do that 3 months from the last 1

## 2024-03-14 ENCOUNTER — OFFICE VISIT (OUTPATIENT)
Dept: ORTHOPEDIC SURGERY | Facility: CLINIC | Age: 49
End: 2024-03-14
Payer: COMMERCIAL

## 2024-03-14 VITALS — HEIGHT: 69 IN | WEIGHT: 240.8 LBS | BODY MASS INDEX: 35.66 KG/M2 | TEMPERATURE: 98.3 F

## 2024-03-14 DIAGNOSIS — M75.41 IMPINGEMENT SYNDROME OF RIGHT SHOULDER: Primary | ICD-10-CM

## 2024-03-14 PROCEDURE — 99213 OFFICE O/P EST LOW 20 MIN: CPT | Performed by: ORTHOPAEDIC SURGERY

## 2024-03-14 RX ORDER — NAPROXEN 500 MG/1
500 TABLET ORAL 2 TIMES DAILY
Qty: 60 TABLET | Refills: 0 | Status: SHIPPED | OUTPATIENT
Start: 2024-03-14

## 2024-03-15 ENCOUNTER — TREATMENT (OUTPATIENT)
Dept: PHYSICAL THERAPY | Facility: CLINIC | Age: 49
End: 2024-03-15
Payer: COMMERCIAL

## 2024-03-15 DIAGNOSIS — M25.611 DECREASED RIGHT SHOULDER RANGE OF MOTION: ICD-10-CM

## 2024-03-15 DIAGNOSIS — M25.511 CHRONIC PAIN IN RIGHT SHOULDER: Primary | ICD-10-CM

## 2024-03-15 DIAGNOSIS — G89.29 CHRONIC PAIN IN RIGHT SHOULDER: Primary | ICD-10-CM

## 2024-03-15 NOTE — PROGRESS NOTES
Physical Therapy Daily Treatment Note  [facility] [place]  [address], [address]  [place] [id number]     Patient: Elvis Vallejo  : 1975  Referring practitioner: Sabrina Alexander MD  Today's Date: 3/15/2024    VISIT#: 4    Visit Diagnoses:    ICD-10-CM ICD-9-CM   1. Chronic pain in right shoulder  M25.511 719.41    G89.29 338.29   2. Decreased right shoulder range of motion  M25.611 719.51       Subjective   Elvis Vallejo reports: Patient reports that his shoulder is still achy especially in the front.       Objective       See Exercise, Manual, and Modality Logs for complete treatment.     Patient Education: HEP review  Exercise rationale/ pain free exercise performance  Alternate exercise positions  Verbal/Tactile cues to ensure correct exercise performance/technique       Assessment/Plan  Patient demonstrates good tolerance to therapeutic exercises on this date with no report of pain throughout or at the end of the session. After providing posterior and inferior GH joint mobs PROM improves with less report of a pinch in the anterior shoulder. Continued to progress postural exercises to improve postural deficits.       Progress per Plan of Care and Progress strengthening /stabilization /functional activity          Timed:         Manual Therapy:    8     mins  51543;     Therapeutic Exercise:    15     mins  00015;     Neuromuscular Thao:    12    mins  85711;    Therapeutic Activity:     -     mins  48665;     Gait Training:           mins  52138;     Ultrasound:          mins  35611;    Ionto:                                   mins  71629  Self Care:                       -     mins  28265    Un-Timed:  Electrical Stimulation:         mins  74151 (MC );  Dry Needling          mins self-pay  Traction          mins 75240  Re-Eval                               mins  16886  Group Therapy           ___ mins 13656    Timed Treatment:   35   mins   Total Treatment:      35   mins    Iris Plasencia PT  Physical Therapist  [place] [facility] license #: [id number]

## 2024-03-22 ENCOUNTER — TREATMENT (OUTPATIENT)
Dept: PHYSICAL THERAPY | Facility: CLINIC | Age: 49
End: 2024-03-22
Payer: COMMERCIAL

## 2024-03-22 DIAGNOSIS — G89.29 CHRONIC PAIN IN RIGHT SHOULDER: Primary | ICD-10-CM

## 2024-03-22 DIAGNOSIS — M25.611 DECREASED RIGHT SHOULDER RANGE OF MOTION: ICD-10-CM

## 2024-03-22 DIAGNOSIS — M25.511 CHRONIC PAIN IN RIGHT SHOULDER: Primary | ICD-10-CM

## 2024-03-22 NOTE — LETTER
Physical Therapy Daily Treatment and Progress Note  [facility] [place]  [address], [address]  [place] [address]     Patient: Elvis Vallejo  : 1975  Referring practitioner: Sabrina Alexander MD  Today's Date: 3/22/2024    VISIT#: 5    Visit Diagnoses:    ICD-10-CM ICD-9-CM   1. Chronic pain in right shoulder  M25.511 719.41    G89.29 338.29   2. Decreased right shoulder range of motion  M25.611 719.51     Quick DASH: 18.18- 4.55%    Subjective Evaluation    Pain  Current pain ratin  At best pain ratin  At worst pain ratin         Elvis Vallejo reports: Patient reports that he has seen some improvement but still isn't where he wants to be, he has seen about an 80-90% improvement but still has a pretty consistent pain.      Objective   Right Shoulder   Flexion: 155 degrees with pain (160 degrees, no pain)  Abduction: 150 degrees with pain (150 degrees, a little tightness)  External rotation 0°: 60 degrees with pain (70 degrees, no pain)      Patient Education: HEP review  Exercise rationale/ pain free exercise performance  Alternate exercise positions  Verbal/Tactile cues to ensure correct exercise performance/technique       Assessment/Plan  Patient has demonstrated good progress thus far with therapeutic interventions. Subjectively he has seen about an 80-90% improvement since the date of his initial evaluation and pain seems to be better controlled. His outcome measure demonstrates a decrease in perceived disability from an 18.18% to a 4.55% demonstrating more functional tolerance. Objectively his ROM has improved in all planes of motion with an exception of abduction, but no decrease. He also reports no pain with AROM on this date. Today cupping was added in conjunction with posterior and inferior GH mobs, patient reports increased in ROM and decrease in pain. He has met 2/4 STGs and 1/4 LTGs and is progressing well to the others. Patient would  continue to benefit from continued therapeutic exercises and interventions to continue to progress towards patient's goals.   Goals  Plan Goals: SHORT TERM GOALS: 4 weeks  1. Patient to be compliant with HEP and no difficulty with sleeping (MET)  2. Increased (R) UE strength to 4+/5 with no pain> 3/10 to allow for household and work activities. (MET)  3. Pt to exhibit (R) shoulder active flexion / ABD to 160° in standing/sitting to assist with reaching overhead with less pain to wash hair and change shirt. (Progressing, lacking abduction)  4. Pt demonstrates improved posture in sitting and standing for 30 mins with minimal-no cues during treatment session to help decrease the stress on the shoulders.  (Progressing)     LONG TERM GOALS: 8 weeks  1. Pt score <10% perceived disability on Quick DASH. (MET)  2. Pt. to exhibit (R) shoulder AROM to WFL (> 165°) flex/abd. 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 (Progressing)  3. Pt to exhibit 5/5 UE strength to allow for pushing/pulling and lifting >5 #to occur with pain <2/10 to help with household cleaning, grocery shopping and recreational activities. (Progressing, strength not tested on this date)  4. Pt able to reach overhead and lift 10# (B) x 10 to allow for return to doing work around home and help improve ability to return to exercise without restriction.  (Not tested)    Iris Plasencia, PT  Physical Therapist  [place] [facility] license #: [id number]

## 2024-03-22 NOTE — PROGRESS NOTES
Physical Therapy Daily Treatment and Progress Note  [facility] [place]  [address], [address]  [place] [address]     Patient: Elvis Vallejo  : 1975  Referring practitioner: Sabrina Alexander MD  Today's Date: 3/22/2024    VISIT#: 5    Visit Diagnoses:    ICD-10-CM ICD-9-CM   1. Chronic pain in right shoulder  M25.511 719.41    G89.29 338.29   2. Decreased right shoulder range of motion  M25.611 719.51     Quick DASH: 18.18- 4.55%    Subjective Evaluation    Pain  Current pain ratin  At best pain ratin  At worst pain ratin         Elvis Vallejo reports: Patient reports that he has seen some improvement but still isn't where he wants to be, he has seen about an 80-90% improvement but still has a pretty consistent pain.      Objective   Right Shoulder   Flexion: 155 degrees with pain (160 degrees, no pain)  Abduction: 150 degrees with pain (150 degrees, a little tightness)  External rotation 0°: 60 degrees with pain (70 degrees, no pain)    See Exercise, Manual, and Modality Logs for complete treatment.     Patient Education: HEP review  Exercise rationale/ pain free exercise performance  Alternate exercise positions  Verbal/Tactile cues to ensure correct exercise performance/technique       Assessment/Plan  Patient has demonstrated good progress thus far with therapeutic interventions. Subjectively he has seen about an 80-90% improvement since the date of his initial evaluation and pain seems to be better controlled. His outcome measure demonstrates a decrease in perceived disability from an 18.18% to a 4.55% demonstrating more functional tolerance. Objectively his ROM has improved in all planes of motion with an exception of abduction, but no decrease. He also reports no pain with AROM on this date. Today cupping was added in conjunction with posterior and inferior GH mobs, patient reports increased in ROM and decrease in pain. He has met 2/4 STGs and /4  LTGs and is progressing well to the others. Patient would continue to benefit from continued therapeutic exercises and interventions to continue to progress towards patient's goals.   Goals  Plan Goals: SHORT TERM GOALS: 4 weeks  1. Patient to be compliant with HEP and no difficulty with sleeping (MET)  2. Increased (R) UE strength to 4+/5 with no pain> 3/10 to allow for household and work activities. (MET)  3. Pt to exhibit (R) shoulder active flexion / ABD to 160° in standing/sitting to assist with reaching overhead with less pain to wash hair and change shirt. (Progressing, lacking abduction)  4. Pt demonstrates improved posture in sitting and standing for 30 mins with minimal-no cues during treatment session to help decrease the stress on the shoulders.  (Progressing)     LONG TERM GOALS: 8 weeks  1. Pt score <10% perceived disability on Quick DASH. (MET)  2. Pt. to exhibit (R) shoulder AROM to WFL (> 165°) flex/abd. 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 (Progressing)  3. Pt to exhibit 5/5 UE strength to allow for pushing/pulling and lifting >5 #to occur with pain <2/10 to help with household cleaning, grocery shopping and recreational activities. (Progressing, strength not tested on this date)  4. Pt able to reach overhead and lift 10# (B) x 10 to allow for return to doing work around home and help improve ability to return to exercise without restriction.  (Not tested)    Progress per Plan of Care and Progress strengthening /stabilization /functional activity          Timed:         Manual Therapy:    15     mins  84582;     Therapeutic Exercise:    10     mins  48138;     Neuromuscular Thao:    25    mins  55073;    Therapeutic Activity:     -     mins  93745;     Gait Training:           mins  84076;     Ultrasound:          mins  82861;    Ionto:                                   mins  49730  Self Care:                        -     mins  31077    Un-Timed:  Electrical Stimulation:         mins  61274 ( );  Dry Needling          mins self-pay  Traction          mins 96769  Re-Eval                               mins  44983  Group Therapy           ___ mins 84729    Timed Treatment:   50   mins   Total Treatment:     55   mins    Iris Plasencia PT  Physical Therapist  [facility] license #: [id number]

## 2024-03-29 ENCOUNTER — TREATMENT (OUTPATIENT)
Dept: PHYSICAL THERAPY | Facility: CLINIC | Age: 49
End: 2024-03-29
Payer: COMMERCIAL

## 2024-03-29 DIAGNOSIS — G89.29 CHRONIC PAIN IN RIGHT SHOULDER: Primary | ICD-10-CM

## 2024-03-29 DIAGNOSIS — M25.611 DECREASED RIGHT SHOULDER RANGE OF MOTION: ICD-10-CM

## 2024-03-29 DIAGNOSIS — M25.511 CHRONIC PAIN IN RIGHT SHOULDER: Primary | ICD-10-CM

## 2024-03-29 NOTE — PROGRESS NOTES
Physical Therapy Daily Treatment Note  [facility] [place]  [address], [address]  [place] [id number]     Patient: Elvis Vallejo  : 1975  Referring practitioner: Sabrina Alexander MD  Today's Date: 3/29/2024    VISIT#: 6    Visit Diagnoses:    ICD-10-CM ICD-9-CM   1. Chronic pain in right shoulder  M25.511 719.41    G89.29 338.29   2. Decreased right shoulder range of motion  M25.611 719.51       Subjective   Elvis Vallejo reports: Patient reports that the cupping seemed to help, he is having less pain but still some tightness in his shoulder.      Objective       See Exercise, Manual, and Modality Logs for complete treatment.     Patient Education: HEP review  Exercise rationale/ pain free exercise performance  Alternate exercise positions  Verbal/Tactile cues to ensure correct exercise performance/technique       Assessment/Plan  Patient continues to demonstrate good tolerance to therapeutic exercises and manual therapy on this date with no report of shoulder pain. After cupping and GH posterior and inferior joint mobs he reports feeling less of the pinching sensation with OH movements. Continue with manual treatments and progressing therapeutic exercises and activities as tolerated.       Progress per Plan of Care and Progress strengthening /stabilization /functional activity          Timed:         Manual Therapy:    10     mins  26835;     Therapeutic Exercise:    8     mins  37469;     Neuromuscular Thao:    15    mins  35065;    Therapeutic Activity:     12     mins  84641;     Gait Training:           mins  72012;     Ultrasound:          mins  31889;    Ionto:                                   mins  45212  Self Care:                       -     mins  63313    Un-Timed:  Electrical Stimulation:         mins  25310 ( );  Dry Needling          mins self-pay  Traction          mins 89889  Re-Eval                               mins  12639  Group Therapy            ___ mins 35264    Timed Treatment:   45   mins   Total Treatment:     58   mins    Iris Plasencia PT  Physical Therapist  [facility] license #: [id number]

## 2024-04-12 ENCOUNTER — TREATMENT (OUTPATIENT)
Dept: PHYSICAL THERAPY | Facility: CLINIC | Age: 49
End: 2024-04-12
Payer: COMMERCIAL

## 2024-04-12 DIAGNOSIS — M25.611 DECREASED RIGHT SHOULDER RANGE OF MOTION: ICD-10-CM

## 2024-04-12 DIAGNOSIS — M75.41 IMPINGEMENT SYNDROME OF RIGHT SHOULDER: Primary | ICD-10-CM

## 2024-04-12 DIAGNOSIS — G89.29 CHRONIC PAIN IN RIGHT SHOULDER: Primary | ICD-10-CM

## 2024-04-12 DIAGNOSIS — M25.511 CHRONIC PAIN IN RIGHT SHOULDER: Primary | ICD-10-CM

## 2024-04-12 RX ORDER — NAPROXEN 500 MG/1
500 TABLET ORAL 2 TIMES DAILY
Qty: 60 TABLET | Refills: 0 | OUTPATIENT
Start: 2024-04-12

## 2024-04-12 NOTE — TELEPHONE ENCOUNTER
He is on lisinopril so we do not like to do long-term anti-inflammatories.  If he feels like he needs it I would have him get it from his primary care

## 2024-04-12 NOTE — PROGRESS NOTES
Physical Therapy Daily Treatment Note  [facility] [place]  [address], [address]  [place] [id number]     Patient: Elvis Vallejo  : 1975  Referring practitioner: Sabrina Alexander MD  Today's Date: 2024    VISIT#: 7    Visit Diagnoses:    ICD-10-CM ICD-9-CM   1. Chronic pain in right shoulder  M25.511 719.41    G89.29 338.29   2. Decreased right shoulder range of motion  M25.611 719.51       Subjective   Elvis Valeljo reports: Patient reports that he is feeling about the same, he feels better for about 2 days after therapy but then goes back to feeling tight and some discomfort with OH movements.       Objective       See Exercise, Manual, and Modality Logs for complete treatment.     Patient Education: HEP review  Exercise rationale/ pain free exercise performance  Alternate exercise positions  Verbal/Tactile cues to ensure correct exercise performance/technique       Assessment/Plan  Patient demonstrated good tolerance to therapeutic exercises on this date with no report of shoulder pain throughout. After posterior and inferior GH mobs he reports increase in ROM and decrease in discomfort. Added a self posterior capsul for his HEP and discussed the importance of continuing all exercises at home consistently.       Progress per Plan of Care and Progress strengthening /stabilization /functional activity          Timed:         Manual Therapy:    8     mins  33407;     Therapeutic Exercise:    12     mins  56661;     Neuromuscular Thao:    10    mins  42982;    Therapeutic Activity:     -     mins  69941;     Gait Training:           mins  97287;     Ultrasound:          mins  42236;    Ionto:                                   mins  43129  Self Care:                       -     mins  62331    Un-Timed:  Electrical Stimulation:         mins  92690 ( );  Dry Needling          mins self-pay  Traction          mins 88218  Re-Eval                                mins  40311  Group Therapy           ___ mins 39599    Timed Treatment:   30   mins   Total Treatment:     52   mins    Iris Plasencia PT  Physical Therapist  [facility] license #: [id number]

## 2024-04-19 ENCOUNTER — TELEPHONE (OUTPATIENT)
Dept: ORTHOPEDICS | Facility: OTHER | Age: 49
End: 2024-04-19
Payer: COMMERCIAL

## 2024-04-24 ENCOUNTER — TREATMENT (OUTPATIENT)
Dept: PHYSICAL THERAPY | Facility: CLINIC | Age: 49
End: 2024-04-24
Payer: COMMERCIAL

## 2024-04-24 DIAGNOSIS — G89.29 CHRONIC PAIN IN RIGHT SHOULDER: Primary | ICD-10-CM

## 2024-04-24 DIAGNOSIS — M25.611 DECREASED RIGHT SHOULDER RANGE OF MOTION: ICD-10-CM

## 2024-04-24 DIAGNOSIS — M25.511 CHRONIC PAIN IN RIGHT SHOULDER: Primary | ICD-10-CM

## 2024-04-24 PROCEDURE — 97110 THERAPEUTIC EXERCISES: CPT

## 2024-04-24 PROCEDURE — 97140 MANUAL THERAPY 1/> REGIONS: CPT

## 2024-04-24 PROCEDURE — 97112 NEUROMUSCULAR REEDUCATION: CPT

## 2024-04-24 NOTE — PROGRESS NOTES
Physical Therapy Daily Treatment Note  [facility] [place]  [address], [address]  [place] [id number]     Patient: Elvis Vallejo  : 1975  Referring practitioner: Sabrina Alexander MD  Today's Date: 2024    VISIT#: 8    Visit Diagnoses:    ICD-10-CM ICD-9-CM   1. Chronic pain in right shoulder  M25.511 719.41    G89.29 338.29   2. Decreased right shoulder range of motion  M25.611 719.51       Subjective   Elvis Vallejo reports: Patient reports that he is feeling about the same, still feels better for about 2 days after PT but then becomes stiff and uncomfortable again. He sees Dr. Alexander on Friday.      Objective       See Exercise, Manual, and Modality Logs for complete treatment.     Patient Education: HEP review  Exercise rationale/ pain free exercise performance  Alternate exercise positions  Verbal/Tactile cues to ensure correct exercise performance/technique       Assessment/Plan  Patient demonstrates good tolerance to therapeutic exercises on this date. He demonstrates good improvement with shoulder ROM and decreased pinching sensation in the shoulder with OH movements. Discussed the importance of being consistent with exercises and not just performing HEP 2x a week. Answered any questions that he had at the time and provided him with my email to keep me updated as how the appointment on Friday goes.       Progress per Plan of Care and Progress strengthening /stabilization /functional activity          Timed:         Manual Therapy:    8     mins  92812;     Therapeutic Exercise:    20     mins  50107;     Neuromuscular Thao:    23    mins  08920;    Therapeutic Activity:     -     mins  88499;     Gait Training:           mins  74116;     Ultrasound:          mins  01455;    Ionto:                                   mins  57604  Self Care:                       -     mins  90780    Un-Timed:  Electrical Stimulation:         mins  84679 ( );  Dry  Needling          mins self-pay  Traction          mins 98340  Re-Eval                               mins  71969  Group Therapy           ___ mins 20415    Timed Treatment:   51   mins   Total Treatment:     51   mins    Iris Plasencia PT  Physical Therapist  [facility] license #: [id number]

## 2024-04-25 NOTE — PROGRESS NOTES
Patient: Elvis Vallejo  YOB: 1975  Date of Service: 4/25/2024    Chief Complaints:   Right shoulder pain subjective:    History of Present Illness: Pt is seen in the office today with complaints of right shoulder pain he is status post rotator cuff repair had had some persistent pain we did repeat his MRI.  His repair looks intact we have injected him in the past he did get good relief he states he just cannot quite get over the hump he is still doing physical therapy        Allergies:   Allergies   Allergen Reactions    Peanut Oil Other (See Comments)     Peanut Dust- Eyes swelled, runny nose, sore throat       Medications:   Home Medications:  Current Outpatient Medications on File Prior to Visit   Medication Sig    atorvastatin (LIPITOR) 20 MG tablet Take 1 tablet by mouth Every Morning.    clotrimazole (LOTRIMIN) 1 % external solution Apply  topically to the appropriate area as directed 2 (Two) Times a Day. (Patient not taking: Reported on 3/4/2024)    dexlansoprazole (DEXILANT) 60 MG capsule Take 1 capsule by mouth Daily.    ipratropium (ATROVENT) 0.06 % nasal spray     lisinopril-hydrochlorothiazide (PRINZIDE,ZESTORETIC) 20-12.5 MG per tablet Take 1 tablet by mouth Every Morning.    loratadine (CLARITIN) 10 MG tablet Take 1 tablet by mouth Every Morning.    meloxicam (MOBIC) 15 MG tablet TAKE ONE TABLET BY MOUTH DAILY WITH FOOD (Patient not taking: Reported on 3/4/2024)    meloxicam (MOBIC) 15 MG tablet 1 PO Daily with food. (Patient not taking: Reported on 3/4/2024)    montelukast (SINGULAIR) 10 MG tablet Take 1 tablet by mouth Every Morning.    multivitamin with minerals tablet tablet Take 1 tablet by mouth Daily.    naproxen (NAPROSYN) 500 MG tablet Take 1 tablet by mouth 2 (Two) Times a Day.     No current facility-administered medications on file prior to visit.     Current Medications:  Scheduled Meds:  Continuous Infusions:No current facility-administered medications for this  visit.    PRN Meds:.    I have reviewed the patient's medical history in detail and updated the computerized patient record.  Review and summarization of old records include:    Past Medical History:   Diagnosis Date    Allergic     Anesthesia complication     urinary retention    Guillermo esophagus     Guillermo's esophagus     GERD (gastroesophageal reflux disease)     Hyperlipidemia     Hypertension     Nausea & vomiting 10/05/2016        Past Surgical History:   Procedure Laterality Date    COLONOSCOPY N/A 03/14/2017    Procedure: COLONOSCOPY with polypectomy ;  Surgeon: Tricia Addison MD;  Location: [facility] OR;  Service:     COLONOSCOPY N/A 03/02/2020    Procedure: COLONOSCOPY with polypectomy;  Surgeon: Gloria Hoffman MD;  Location: [facility] OR;  Service: Gastroenterology;  Laterality: N/A;  ascending colon polyp  rectal polyps x2  diverticulosis  hemorrhoids    ENDOSCOPY N/A 10/27/2016    Procedure: ESOPHAGOGASTRODUODENOSCOPY WITH BILE COLLECTION / RANDOM GASTRIC BIOPSIES,DUODENAL AND DISTAL ESOPHAGEAL BIOPSIES  AND ALFREDO TEST BIOPSY;  Surgeon: Tricia Addison MD;  Location: [facility] OR;  Service:     ENDOSCOPY N/A 09/26/2017    Procedure: ESOPHAGOGASTRODUODENOSCOPY with biopsies, stomach biopsy for  ALFREDO test;  Surgeon: Tricia Addison MD;  Location: [facility] OR;  Service:     ENDOSCOPY N/A 03/02/2020    Procedure: ESOPHAGOGASTRODUODENOSCOPY with biopsies;  Surgeon: Gloria Hoffman MD;  Location: [facility] OR;  Service: Gastroenterology;  Laterality: N/A;  gastric biopsy  distal esophagus biopsy  gastritis  hiatal hernia  Guillermo's esophagus    ENDOSCOPY      HEMORRHOIDECTOMY      SHOULDER ARTHROSCOPY W/ ROTATOR CUFF REPAIR Right 04/20/2022    Procedure: SHOULDER ARTHROSCOPY WITH ROTATOR CUFF REPAIR, decompression, distal clavicle excision ;  Surgeon: Sabrina Alexander MD;  Location: [facility];  Service: Orthopedics;  Laterality: Right;    WISDOM TOOTH EXTRACTION          Social History     Occupational History     Not on file   Tobacco Use    Smoking status: Former     Types: Cigars     Quit date:      Years since quittin.3    Smokeless tobacco: Never    Tobacco comments:     only used 1-2 times a year   Vaping Use    Vaping status: Never Used   Substance and Sexual Activity    Alcohol use: Yes     Alcohol/week: 3.0 standard drinks of alcohol     Types: 3 Cans of beer per week    Drug use: No    Sexual activity: Defer      Social History     Social History Narrative    Not on file        Family History   Problem Relation Age of Onset    Heart attack Paternal Grandmother     Hypertension Paternal Grandfather     Heart attack Paternal Grandfather     Colon cancer Neg Hx     Colon polyps Neg Hx     Malig Hyperthermia Neg Hx        ROS: 14 point review of systems was performed and was negative except for documented findings in HPI and today's encounter.     Allergies:   Allergies   Allergen Reactions    Peanut Oil Other (See Comments)     Peanut Dust- Eyes swelled, runny nose, sore throat     Constitutional:  Denies fever, shaking or chills   Eyes:  Denies change in visual acuity   HENT:  Denies nasal congestion or sore throat   Respiratory:  Denies cough or shortness of breath   Cardiovascular:  Denies chest pain or severe LE edema   GI:  Denies abdominal pain, nausea, vomiting, bloody stools or diarrhea   Musculoskeletal:  Numbness, tingling, or loss of motor function only as noted above in history of present illness.  : Denies painful urination or hematuria  Integument:  Denies rash, lesion or ulceration   Neurologic:  Denies headache or focal weakness  Endocrine:  Denies lymphadenopathy  Psych:  Denies confusion or change in mental status   Hem:  Denies active bleeding      Physical Exam: 48 y.o. male  Wt Readings from Last 3 Encounters:   24 109 kg (240 lb 12.8 oz)   24 107 kg (235 lb 6.4 oz)   24 101 kg (222 lb)       There is no height or weight on file to calculate BMI.    There were no  vitals filed for this visit.  Vital signs reviewed.   General Appearance:    Alert, cooperative, in no acute distress                    Ortho exam    Physical exam of the right shoulder reveals no overlying skin changes no lymphedema no lymphadenopathy.  Patient has active flexion 175 with mild symptoms abduction is similar external rotation is to 50 and internal rotation to the mid lumbar spine with mild symptoms.  Patient has good rotator cuff strength 4+ over 5 with isometric strength testing with pain.  Patient has a positive impingement and a positive Hernadez sign.  Patient has good cervical range of motion which is full and asymptomatic no radicular symptoms.  Patient has a normal elbow exam.  Good distal pulses are present  Patient has pain with overhead activity and a positive Neer sign and a positive empty can sign , a positive drop arm and a definitive painful arc              Assessment: Right shoulder pain status post rotator cuff repair still has a little bit of pain I think this is still just inflammation and balancing the shoulder I think another subacromial injection is quite reasonable and have him really work on stretching his anterior chest getting his shoulders back engaging the shoulder blade he will continue with therapy progress to home program as long as he continues to improve he will follow-up as needed    Plan:   Follow up as indicated.  Ice, elevate, and rest as needed.  Discussed conservative measures of pain control including ice, bracing.  Also talked about the importance of strengthening Large Joint Arthrocentesis: R subacromial bursa  Date/Time: 4/26/2024 8:46 AM  Consent given by: patient  Site marked: site marked  Timeout: Immediately prior to procedure a time out was called to verify the correct patient, procedure, equipment, support staff and site/side marked as required   Supporting Documentation  Indications: pain   Procedure Details  Location: shoulder - R subacromial  bursa  Preparation: Patient was prepped and draped in the usual sterile fashion  Needle gauge: 21G.  Approach: posterior  Medications administered: 80 mg methylPREDNISolone acetate 80 MG/ML; 2 mL lidocaine PF 1% 1 %  Patient tolerance: patient tolerated the procedure well with no immediate complications         Sabrina Alexander M.D.

## 2024-04-26 ENCOUNTER — OFFICE VISIT (OUTPATIENT)
Dept: ORTHOPEDIC SURGERY | Facility: CLINIC | Age: 49
End: 2024-04-26
Payer: COMMERCIAL

## 2024-04-26 VITALS — BODY MASS INDEX: 33.44 KG/M2 | TEMPERATURE: 98.4 F | WEIGHT: 225.8 LBS | HEIGHT: 69 IN

## 2024-04-26 DIAGNOSIS — Z98.890 S/P ROTATOR CUFF REPAIR: Primary | ICD-10-CM

## 2024-04-26 RX ORDER — METHYLPREDNISOLONE ACETATE 80 MG/ML
80 INJECTION, SUSPENSION INTRA-ARTICULAR; INTRALESIONAL; INTRAMUSCULAR; SOFT TISSUE
Status: COMPLETED | OUTPATIENT
Start: 2024-04-26 | End: 2024-04-26

## 2024-04-26 RX ORDER — OLOPATADINE HYDROCHLORIDE AND MOMETASONE FUROATE 25; 665 UG/1; UG/1
SPRAY, METERED NASAL
COMMUNITY
Start: 2024-04-24

## 2024-04-26 RX ORDER — LIDOCAINE HYDROCHLORIDE 10 MG/ML
2 INJECTION, SOLUTION EPIDURAL; INFILTRATION; INTRACAUDAL; PERINEURAL
Status: COMPLETED | OUTPATIENT
Start: 2024-04-26 | End: 2024-04-26

## 2024-04-26 RX ADMIN — METHYLPREDNISOLONE ACETATE 80 MG: 80 INJECTION, SUSPENSION INTRA-ARTICULAR; INTRALESIONAL; INTRAMUSCULAR; SOFT TISSUE at 08:46

## 2024-04-26 RX ADMIN — LIDOCAINE HYDROCHLORIDE 2 ML: 10 INJECTION, SOLUTION EPIDURAL; INFILTRATION; INTRACAUDAL; PERINEURAL at 08:46

## 2024-05-28 ENCOUNTER — HOSPITAL ENCOUNTER (EMERGENCY)
Facility: HOSPITAL | Age: 49
Discharge: HOME | End: 2024-05-28
Attending: EMERGENCY MEDICINE
Payer: COMMERCIAL

## 2024-05-28 ENCOUNTER — APPOINTMENT (OUTPATIENT)
Dept: ULTRASOUND IMAGING | Facility: HOSPITAL | Age: 49
End: 2024-05-28
Payer: COMMERCIAL

## 2024-05-28 ENCOUNTER — APPOINTMENT (OUTPATIENT)
Dept: GENERAL RADIOLOGY | Facility: HOSPITAL | Age: 49
End: 2024-05-28
Payer: COMMERCIAL

## 2024-05-28 VITALS
BODY MASS INDEX: 35.55 KG/M2 | WEIGHT: 240 LBS | HEIGHT: 69 IN | TEMPERATURE: 98.2 F | RESPIRATION RATE: 18 BRPM | HEART RATE: 95 BPM | DIASTOLIC BLOOD PRESSURE: 85 MMHG | SYSTOLIC BLOOD PRESSURE: 128 MMHG | OXYGEN SATURATION: 100 %

## 2024-05-28 DIAGNOSIS — M79.605 MUSCULOSKELETAL PAIN OF LEFT LOWER EXTREMITY: Primary | ICD-10-CM

## 2024-05-28 PROCEDURE — 73610 X-RAY EXAM OF ANKLE: CPT

## 2024-05-28 PROCEDURE — 73590 X-RAY EXAM OF LOWER LEG: CPT

## 2024-05-28 PROCEDURE — 73562 X-RAY EXAM OF KNEE 3: CPT

## 2024-05-28 PROCEDURE — 96372 THER/PROPH/DIAG INJ SC/IM: CPT

## 2024-05-28 PROCEDURE — 99284 EMERGENCY DEPT VISIT MOD MDM: CPT

## 2024-05-28 PROCEDURE — 25010000002 KETOROLAC TROMETHAMINE PER 15 MG: Performed by: PHYSICIAN ASSISTANT

## 2024-05-28 PROCEDURE — 93971 EXTREMITY STUDY: CPT

## 2024-05-28 PROCEDURE — 99284 EMERGENCY DEPT VISIT MOD MDM: CPT | Performed by: PHYSICIAN ASSISTANT

## 2024-05-28 RX ORDER — KETOROLAC TROMETHAMINE 30 MG/ML
30 INJECTION, SOLUTION INTRAMUSCULAR; INTRAVENOUS ONCE
Status: COMPLETED | OUTPATIENT
Start: 2024-05-28 | End: 2024-05-28

## 2024-05-28 RX ORDER — METHOCARBAMOL 500 MG/1
500 TABLET, FILM COATED ORAL 4 TIMES DAILY PRN
Qty: 15 TABLET | Refills: 0 | Status: SHIPPED | OUTPATIENT
Start: 2024-05-28 | End: 2024-06-02

## 2024-05-28 RX ADMIN — KETOROLAC TROMETHAMINE 30 MG: 30 INJECTION, SOLUTION INTRAMUSCULAR at 21:45

## 2024-05-28 NOTE — FSED PROVIDER NOTE
Subjective   History of Present Illness    Patient reports that he was on a ladder 5 days ago, approximately 4 feet high when he missed a step and fell, landing on his left leg.  He fell on grass.  Denies hitting his head.  He is now here due to persistent left leg pain.  Pain is worse with ambulation and improved with rest.    Review of Systems   Constitutional:  Negative for activity change and appetite change.   Eyes:  Negative for pain.   Respiratory:  Negative for shortness of breath.    Gastrointestinal:  Negative for nausea and vomiting.   Musculoskeletal:  Positive for arthralgias and joint swelling.   Skin:  Negative for color change.   Neurological:  Negative for dizziness.   All other systems reviewed and are negative.      Past Medical History:   Diagnosis Date    Allergic     Anesthesia complication     urinary retention    Guillermo esophagus     Guillermo's esophagus     GERD (gastroesophageal reflux disease)     Hyperlipidemia     Hypertension     Nausea & vomiting 10/05/2016       Allergies   Allergen Reactions    Peanut Oil Other (See Comments)     Peanut Dust- Eyes swelled, runny nose, sore throat       Past Surgical History:   Procedure Laterality Date    COLONOSCOPY N/A 03/14/2017    Procedure: COLONOSCOPY with polypectomy ;  Surgeon: Tricia Addison MD;  Location: [facility] OR;  Service:     COLONOSCOPY N/A 03/02/2020    Procedure: COLONOSCOPY with polypectomy;  Surgeon: Gloria Hoffman MD;  Location: [facility] OR;  Service: Gastroenterology;  Laterality: N/A;  ascending colon polyp  rectal polyps x2  diverticulosis  hemorrhoids    ENDOSCOPY N/A 10/27/2016    Procedure: ESOPHAGOGASTRODUODENOSCOPY WITH BILE COLLECTION / RANDOM GASTRIC BIOPSIES,DUODENAL AND DISTAL ESOPHAGEAL BIOPSIES  AND ALFREDO TEST BIOPSY;  Surgeon: Tricia Addison MD;  Location: [facility] OR;  Service:     ENDOSCOPY N/A 09/26/2017    Procedure: ESOPHAGOGASTRODUODENOSCOPY with biopsies, stomach biopsy for  ALFREDO test;  Surgeon: Tricia Virk  MD Azael;  Location:  [facility] OR;  Service:     ENDOSCOPY N/A 2020    Procedure: ESOPHAGOGASTRODUODENOSCOPY with biopsies;  Surgeon: Gloria Hoffman MD;  Location:  [facility] OR;  Service: Gastroenterology;  Laterality: N/A;  gastric biopsy  distal esophagus biopsy  gastritis  hiatal hernia  Guillermo's esophagus    ENDOSCOPY      HEMORRHOIDECTOMY      SHOULDER ARTHROSCOPY W/ ROTATOR CUFF REPAIR Right 2022    Procedure: SHOULDER ARTHROSCOPY WITH ROTATOR CUFF REPAIR, decompression, distal clavicle excision ;  Surgeon: Sabrina Alexander MD;  [facility];  Service: Orthopedics;  Laterality: Right;    WISDOM TOOTH EXTRACTION         Family History   Problem Relation Age of Onset    Heart attack Paternal Grandmother     Hypertension Paternal Grandfather     Heart attack Paternal Grandfather     Colon cancer Neg Hx     Colon polyps Neg Hx     Malig Hyperthermia Neg Hx        Social History     Socioeconomic History    Marital status:    Tobacco Use    Smoking status: Former     Types: Cigars     Quit date:      Years since quittin.4    Smokeless tobacco: Never    Tobacco comments:     only used 1-2 times a year   Vaping Use    Vaping status: Never Used   Substance and Sexual Activity    Alcohol use: Yes     Alcohol/week: 3.0 standard drinks of alcohol     Types: 3 Cans of beer per week    Drug use: No    Sexual activity: Defer           Objective   Physical Exam  Vitals and nursing note reviewed.   Constitutional:       Appearance: Normal appearance. He is normal weight.   HENT:      Head: Normocephalic and atraumatic.      Nose: Nose normal.      Mouth/Throat:      Mouth: Mucous membranes are moist.   Eyes:      Pupils: Pupils are equal, round, and reactive to light.   Cardiovascular:      Rate and Rhythm: Normal rate and regular rhythm.      Pulses: Normal pulses.      Heart sounds: Normal heart sounds.   Pulmonary:      Effort: Pulmonary effort is normal.      Breath sounds: Normal breath  sounds.   Musculoskeletal:         General: Normal range of motion.      Cervical back: Normal range of motion.      Right lower leg: No edema.      Left lower leg: No edema.      Comments: Left knee, left tib-fib, left ankle: Mild tenderness palpation    Moderate tenderness to palpation to the left calf, no obvious swelling noted   Skin:     General: Skin is warm.   Neurological:      General: No focal deficit present.      Mental Status: He is alert.   Psychiatric:         Behavior: Behavior is cooperative.         Procedures           ED Course                                           Medical Decision Making  Amount and/or Complexity of Data Reviewed  Radiology: ordered.        Final diagnoses:   Musculoskeletal pain of left lower extremity       ED Disposition  ED Disposition       ED Disposition   Discharge    Condition   Stable    Comment   --               Ronna Cook, APRN  [address]  [place] [id number] [phone]               Medication List        New Prescriptions      diclofenac 50 MG EC tablet  Commonly known as: VOLTAREN  Take 1 tablet by mouth 2 (Two) Times a Day As Needed (leg pain) for up to 7 days.     methocarbamol 500 MG tablet  Commonly known as: ROBAXIN  Take 1 tablet by mouth 4 (Four) Times a Day As Needed for Muscle Spasms for up to 5 days.               Where to Get Your Medications        These medications were sent to [id number] - [place], [place] - [address] AT [address] & [address] - [phone]  - [phone]   [address] [address]      Phone: [phone]   diclofenac 50 MG EC tablet  methocarbamol 500 MG tablet

## 2024-05-28 NOTE — Clinical Note
Psychiatric [facility]ED [facility]  [address]  [place] [address]    Elvis Vallejo was seen and treated in our emergency department on 5/28/2024.  He may return to work on 05/31/2024.         Thank you for choosing [facility].    Samaria Saxena PA-C

## 2024-05-29 NOTE — DISCHARGE INSTRUCTIONS
As discussed, use the Voltaren sparingly due to your history of Guillermo's esophagus.  Be careful you take Robaxin, it may make you drowsy.  Follow-up with your primary care doctor next week to recheck your symptoms.

## 2025-02-28 ENCOUNTER — HOSPITAL ENCOUNTER (EMERGENCY)
Facility: HOSPITAL | Age: 50
Discharge: HOME | End: 2025-02-28
Attending: STUDENT IN AN ORGANIZED HEALTH CARE EDUCATION/TRAINING PROGRAM
Payer: COMMERCIAL

## 2025-02-28 ENCOUNTER — APPOINTMENT (OUTPATIENT)
Dept: ULTRASOUND IMAGING | Facility: HOSPITAL | Age: 50
End: 2025-02-28
Payer: COMMERCIAL

## 2025-02-28 VITALS
HEART RATE: 88 BPM | SYSTOLIC BLOOD PRESSURE: 125 MMHG | OXYGEN SATURATION: 97 % | TEMPERATURE: 97.6 F | DIASTOLIC BLOOD PRESSURE: 78 MMHG | RESPIRATION RATE: 18 BRPM

## 2025-02-28 DIAGNOSIS — N50.811 PAIN IN RIGHT TESTICLE: Primary | ICD-10-CM

## 2025-02-28 LAB
BILIRUB UR QL STRIP: NEGATIVE
C TRACH RRNA CVX QL NAA+PROBE: NOT DETECTED
CLARITY UR: CLEAR
COLOR UR: YELLOW
GLUCOSE UR STRIP-MCNC: NEGATIVE MG/DL
HGB UR QL STRIP.AUTO: NEGATIVE
KETONES UR QL STRIP: NEGATIVE
LEUKOCYTE ESTERASE UR QL STRIP.AUTO: NEGATIVE
N GONORRHOEA RRNA SPEC QL NAA+PROBE: NOT DETECTED
NITRITE UR QL STRIP: NEGATIVE
PH UR STRIP.AUTO: 5.5 [PH] (ref 5–8)
PROT UR QL STRIP: NEGATIVE
SP GR UR STRIP: 1.02 (ref 1–1.03)
UROBILINOGEN UR QL STRIP: NORMAL

## 2025-02-28 PROCEDURE — 93976 VASCULAR STUDY: CPT

## 2025-02-28 PROCEDURE — 99284 EMERGENCY DEPT VISIT MOD MDM: CPT | Performed by: STUDENT IN AN ORGANIZED HEALTH CARE EDUCATION/TRAINING PROGRAM

## 2025-02-28 PROCEDURE — 87491 CHLMYD TRACH DNA AMP PROBE: CPT | Performed by: STUDENT IN AN ORGANIZED HEALTH CARE EDUCATION/TRAINING PROGRAM

## 2025-02-28 PROCEDURE — 76870 US EXAM SCROTUM: CPT

## 2025-02-28 PROCEDURE — 87591 N.GONORRHOEAE DNA AMP PROB: CPT | Performed by: STUDENT IN AN ORGANIZED HEALTH CARE EDUCATION/TRAINING PROGRAM

## 2025-02-28 PROCEDURE — 81003 URINALYSIS AUTO W/O SCOPE: CPT | Performed by: STUDENT IN AN ORGANIZED HEALTH CARE EDUCATION/TRAINING PROGRAM

## 2025-02-28 PROCEDURE — 99284 EMERGENCY DEPT VISIT MOD MDM: CPT

## 2025-02-28 RX ORDER — LEVOFLOXACIN 500 MG/1
500 TABLET, FILM COATED ORAL DAILY
Qty: 10 TABLET | Refills: 0 | Status: SHIPPED | OUTPATIENT
Start: 2025-02-28 | End: 2025-03-10

## 2025-02-28 RX ORDER — LEVOFLOXACIN 500 MG/1
500 TABLET, FILM COATED ORAL ONCE
Status: COMPLETED | OUTPATIENT
Start: 2025-02-28 | End: 2025-02-28

## 2025-02-28 RX ADMIN — LEVOFLOXACIN 500 MG: 500 TABLET, FILM COATED ORAL at 13:27

## 2025-02-28 NOTE — FSED PROVIDER NOTE
"Subjective   History of Present Illness  Pt is a 49 y.o. male with PMH as listed who presents for   Chief Complaint   Patient presents with    Testicle Pain     R SIDED TESTICULAR PAIN STARTING THREE DAYS AGO; HX OF EPIDIDYMITIS; NO HX OF TORSION; STATES HE HAD SX ON AFFECTED SIDE TO STRIP VEINS OFF          Patient is a 49-year-old male who presents for right testicular pain for the past 3 to 4 days.  States that he has had pain with urination during this time as well.  He has had history of recurrent epididymitis twice previously, is followed up with urology in the past for issues with the testicle where he states \"nerves were removed from the testicle\".  Unclear what surgery he is describing.      Review of Systems    Past Medical History:   Diagnosis Date    Allergic     Anesthesia complication     urinary retention    Guillermo esophagus     Guillermo's esophagus     GERD (gastroesophageal reflux disease)     Hyperlipidemia     Hypertension     Nausea & vomiting 10/05/2016       Allergies   Allergen Reactions    Peanut Oil Other (See Comments)     Peanut Dust- Eyes swelled, runny nose, sore throat       Past Surgical History:   Procedure Laterality Date    COLONOSCOPY N/A 03/14/2017    Procedure: COLONOSCOPY with polypectomy ;  Surgeon: Tricia Addison MD;  Location: [facility] OR;  Service:     COLONOSCOPY N/A 03/02/2020    Procedure: COLONOSCOPY with polypectomy;  Surgeon: Gloria Hoffman MD;  Location: [facility] OR;  Service: Gastroenterology;  Laterality: N/A;  ascending colon polyp  rectal polyps x2  diverticulosis  hemorrhoids    ENDOSCOPY N/A 10/27/2016    Procedure: ESOPHAGOGASTRODUODENOSCOPY WITH BILE COLLECTION / RANDOM GASTRIC BIOPSIES,DUODENAL AND DISTAL ESOPHAGEAL BIOPSIES  AND ALFREDO TEST BIOPSY;  Surgeon: Tricia Addison MD;  Location: [facility] OR;  Service:     ENDOSCOPY N/A 09/26/2017    Procedure: ESOPHAGOGASTRODUODENOSCOPY with biopsies, stomach biopsy for  ALFREDO test;  Surgeon: Tricia Addison MD;  " Location:  [facility] OR;  Service:     ENDOSCOPY N/A 03/02/2020    Procedure: ESOPHAGOGASTRODUODENOSCOPY with biopsies;  Surgeon: Gloria Hoffman MD;  Location:  [facility] OR;  Service: Gastroenterology;  Laterality: N/A;  gastric biopsy  distal esophagus biopsy  gastritis  hiatal hernia  Guillermo's esophagus    ENDOSCOPY      HEMORRHOIDECTOMY      SHOULDER ARTHROSCOPY W/ ROTATOR CUFF REPAIR Right 04/20/2022    Procedure: SHOULDER ARTHROSCOPY WITH ROTATOR CUFF REPAIR, decompression, distal clavicle excision ;  Surgeon: Sabrina Alexander MD;  Location:  [facility];  Service: Orthopedics;  Laterality: Right;    WISDOM TOOTH EXTRACTION         Family History   Problem Relation Age of Onset    Heart attack Paternal Grandmother     Hypertension Paternal Grandfather     Heart attack Paternal Grandfather     Colon cancer Neg Hx     Colon polyps Neg Hx     Malig Hyperthermia Neg Hx        Social History     Socioeconomic History    Marital status:    Tobacco Use    Smoking status: Former     Types: Cigars     Quit date: 2015     Years since quitting: 10.1    Smokeless tobacco: Never    Tobacco comments:     only used 1-2 times a year   Vaping Use    Vaping status: Never Used   Substance and Sexual Activity    Alcohol use: Yes     Alcohol/week: 3.0 standard drinks of alcohol     Types: 3 Cans of beer per week    Drug use: No    Sexual activity: Defer           Objective   Physical Exam  Constitutional:       Appearance: Normal appearance.   HENT:      Head: Normocephalic and atraumatic.      Mouth/Throat:      Mouth: Mucous membranes are moist.      Pharynx: Oropharynx is clear.   Eyes:      Conjunctiva/sclera: Conjunctivae normal.   Cardiovascular:      Rate and Rhythm: Normal rate.   Pulmonary:      Effort: Pulmonary effort is normal.   Abdominal:      General: Abdomen is flat.   Genitourinary:     Penis: Normal.       Comments: Right testicle tender to palpation, cremasteric reflex intact.  No obvious significant swelling  "no overlying erythema no crepitus noted within the scrotum or groin, no lymphadenopathy noted on my assessment.  Musculoskeletal:      Cervical back: Neck supple.   Skin:     General: Skin is warm and dry.   Neurological:      Mental Status: He is alert.   Psychiatric:         Mood and Affect: Mood normal.         Procedures           ED Course  ED Course as of 02/28/25 1320   Fri Feb 28, 2025   8255 Patient is a 49-year-old male who presents for right testicular pain for the past 3 to 4 days.  States that he has had pain with urination during this time as well.  He has had history of recurrent epididymitis twice previously, is followed up with urology in the past for issues with the testicle where he states \"nerves were removed from the testicle\".  Unclear what surgery is describing.  Will obtain UA and ultrasound of testicle for further evaluation. [JF]   1314 UA negative, gonorrhea chlamydia still pending.  Ultrasound does not show any evidence of epididymitis or torsion.  That being said full epididymis was not evaluated on ultrasound unfortunately only the epididymal head.  Given this limitation and patient stating that his pain is worse than when he had epididymitis in the past we will treat for epididymitis with urology follow-up.  Patient is extremely adamant that he has no concern for STI, will treat for enteric organisms with levofloxacin, given initial dose here and follow-up with urology.  Patient understands and agrees.  All questions answered. [JF]      ED Course User Index  [JF] Kartik Zepeda MD                                           Medical Decision Making  My differential for testicle pain includes but is not limited to testicular torsion, orchitis, epididymitis, hydrocele, spermatocele, testicular cancer, testicular contusion, STD, UTI, inguinal hernia extending into the scrotum and testicular rupture.      Problems Addressed:  Pain in right testicle: complicated acute illness or " injury    Amount and/or Complexity of Data Reviewed  Labs: ordered. Decision-making details documented in ED Course.  Radiology: ordered. Decision-making details documented in ED Course.    Risk  Prescription drug management.        Final diagnoses:   Pain in right testicle       ED Disposition  ED Disposition       ED Disposition   Discharge    Condition   Stable    Comment   --               Ronna Cook, APRN  [address]  [facility] [address]  [place] [address]  [phone]    Schedule an appointment as soon as possible for a visit in 2 days  For re-evaluation    Linda Mendez,   [address]  [address]  [facility] [id number] [phone]    Call today  For re-evaluation         Medication List        New Prescriptions      levoFLOXacin 500 MG tablet  Commonly known as: LEVAQUIN  Take 1 tablet by mouth Daily for 10 days.               Where to Get Your Medications        These medications were sent to [id number] - [place], [place] - [address] [facility] - [phone]  - [phone]   [address] [facility] [address]      Phone: [phone]   levoFLOXacin 500 MG tablet

## 2025-04-16 NOTE — PROGRESS NOTES
Patient: Elvis Vallejo  YOB: 1975  Date of Service: 4/16/2025    Chief Complaints: Right shoulder pain    Subjective:    History of Present Illness: Pt is seen in the office today with complaints of right shoulder pain he is status post rotator cuff repair in April 2022 he did well initially but has had pain I last saw him last year we did an injection which gave him relief for temporary relief we repeated his MRI in March which shows an intact rotator cuff repair he states he still is not right he does admit to doing some exercise but not a lot        Allergies:   Allergies   Allergen Reactions    Peanut Oil Other (See Comments)     Peanut Dust- Eyes swelled, runny nose, sore throat       Medications:   Home Medications:  Current Outpatient Medications on File Prior to Visit   Medication Sig    atorvastatin (LIPITOR) 20 MG tablet Take 1 tablet by mouth Every Morning.    clotrimazole (LOTRIMIN) 1 % external solution Apply  topically to the appropriate area as directed 2 (Two) Times a Day. (Patient not taking: Reported on 3/4/2024)    dexlansoprazole (DEXILANT) 60 MG capsule Take 1 capsule by mouth Daily.    ipratropium (ATROVENT) 0.06 % nasal spray     lisinopril-hydrochlorothiazide (PRINZIDE,ZESTORETIC) 20-12.5 MG per tablet Take 1 tablet by mouth Every Morning.    loratadine (CLARITIN) 10 MG tablet Take 1 tablet by mouth Every Morning.    montelukast (SINGULAIR) 10 MG tablet Take 1 tablet by mouth Every Morning.    multivitamin with minerals tablet tablet Take 1 tablet by mouth Daily.    naproxen (NAPROSYN) 500 MG tablet Take 1 tablet by mouth 2 (Two) Times a Day.    Ryaltris 665-25 MCG/ACT suspension SPRAY 2 SPRAY INTO BOTH NOSTRILS ONCE A DAY ALLERGIC RHINITIS AND NASAL CONGESTION     No current facility-administered medications on file prior to visit.     Current Medications:  Scheduled Meds:  Continuous Infusions:No current facility-administered medications for this visit.    PRN Meds:.    I  have reviewed the patient's medical history in detail and updated the computerized patient record.  Review and summarization of old records include:    Past Medical History:   Diagnosis Date    Allergic     Anesthesia complication     urinary retention    Guillermo esophagus     Guillermo's esophagus     GERD (gastroesophageal reflux disease)     Hyperlipidemia     Hypertension     Nausea & vomiting 10/05/2016        Past Surgical History:   Procedure Laterality Date    COLONOSCOPY N/A 03/14/2017    Procedure: COLONOSCOPY with polypectomy ;  Surgeon: Tricia Addison MD;  Location: [facility] OR;  Service:     COLONOSCOPY N/A 03/02/2020    Procedure: COLONOSCOPY with polypectomy;  Surgeon: Gloria Hoffman MD;  Location: [facility] OR;  Service: Gastroenterology;  Laterality: N/A;  ascending colon polyp  rectal polyps x2  diverticulosis  hemorrhoids    ENDOSCOPY N/A 10/27/2016    Procedure: ESOPHAGOGASTRODUODENOSCOPY WITH BILE COLLECTION / RANDOM GASTRIC BIOPSIES,DUODENAL AND DISTAL ESOPHAGEAL BIOPSIES  AND ALFREDO TEST BIOPSY;  Surgeon: Tricia Addison MD;  Location: [facility] OR;  Service:     ENDOSCOPY N/A 09/26/2017    Procedure: ESOPHAGOGASTRODUODENOSCOPY with biopsies, stomach biopsy for  ALFREDO test;  Surgeon: Tricia Addison MD;  Location: [facility] OR;  Service:     ENDOSCOPY N/A 03/02/2020    Procedure: ESOPHAGOGASTRODUODENOSCOPY with biopsies;  Surgeon: Gloria Hoffman MD;  Location: [facility];  Service: Gastroenterology;  Laterality: N/A;  gastric biopsy  distal esophagus biopsy  gastritis  hiatal hernia  Guillermo's esophagus    ENDOSCOPY      HEMORRHOIDECTOMY      SHOULDER ARTHROSCOPY W/ ROTATOR CUFF REPAIR Right 04/20/2022    Procedure: SHOULDER ARTHROSCOPY WITH ROTATOR CUFF REPAIR, decompression, distal clavicle excision ;  Surgeon: Sabrina Alexander MD;  Location: [facility];  Service: Orthopedics;  Laterality: Right;    WISDOM TOOTH EXTRACTION          Social History     Occupational History    Not on file   Tobacco  Use    Smoking status: Former     Types: Cigars     Quit date: 2015     Years since quitting: 10.2    Smokeless tobacco: Never    Tobacco comments:     only used 1-2 times a year   Vaping Use    Vaping status: Never Used   Substance and Sexual Activity    Alcohol use: Yes     Alcohol/week: 3.0 standard drinks of alcohol     Types: 3 Cans of beer per week    Drug use: No    Sexual activity: Defer      Social History     Social History Narrative    Not on file        Family History   Problem Relation Age of Onset    Heart attack Paternal Grandmother     Hypertension Paternal Grandfather     Heart attack Paternal Grandfather     Colon cancer Neg Hx     Colon polyps Neg Hx     Malig Hyperthermia Neg Hx        ROS: 14 point review of systems was performed and was negative except for documented findings in HPI and today's encounter.     Allergies:   Allergies   Allergen Reactions    Peanut Oil Other (See Comments)     Peanut Dust- Eyes swelled, runny nose, sore throat     Constitutional:  Denies fever, shaking or chills   Eyes:  Denies change in visual acuity   HENT:  Denies nasal congestion or sore throat   Respiratory:  Denies cough or shortness of breath   Cardiovascular:  Denies chest pain or severe LE edema   GI:  Denies abdominal pain, nausea, vomiting, bloody stools or diarrhea   Musculoskeletal:  Numbness, tingling, or loss of motor function only as noted above in history of present illness.  : Denies painful urination or hematuria  Integument:  Denies rash, lesion or ulceration   Neurologic:  Denies headache or focal weakness  Endocrine:  Denies lymphadenopathy  Psych:  Denies confusion or change in mental status   Hem:  Denies active bleeding      Physical Exam: 49 y.o. male  Wt Readings from Last 3 Encounters:   05/28/24 109 kg (240 lb)   04/26/24 102 kg (225 lb 12.8 oz)   03/14/24 109 kg (240 lb 12.8 oz)       There is no height or weight on file to calculate BMI.    There were no vitals filed for this  visit.  Vital signs reviewed.   General Appearance:    Alert, cooperative, in no acute distress                    Ortho exam    Physical exam of the right shoulder reveals no overlying skin changes no lymphedema no lymphadenopathy.  Patient has active flexion 180 with mild symptoms abduction is similar external rotation is to 50 and internal rotation to the upper lumbar spine with mild symptoms.  Patient has good rotator cuff strength 4+ over 5 with isometric strength testing with pain.  Patient has a positive impingement and a positive Hernadez sign.  Patient has good cervical range of motion which is full and asymptomatic no radicular symptoms.  Patient has a normal elbow exam.  Good distal pulses are present  Patient has pain with overhead activity and a positive Neer sign and a positive empty can sign , a positive drop arm and a definitive painful arc   He does have a very rounded shoulder forward head posture very weak 4 top and bottom           Assessment: Status post rotator cuff repair almost 3 years ago I still think this is cuff we had a pretty good looking MRI from March of last year I would like to try another injection have him get diligent with his exercises really work on his posture will get him back to physical therapy if he fails to improve would consider repeating his MRI I think a lot of this is postural and a lot of this is weakness    Plan: As above  Follow up as indicated.  Ice, elevate, and rest as needed.  Discussed conservative measures of pain control including ice, bracing.  Also talked about the importance of strengthening and maintaining ideal body weight    Large Joint Arthrocentesis: R subacromial bursa  Date/Time: 4/24/2025 9:16 AM  Consent given by: patient  Site marked: site marked  Timeout: Immediately prior to procedure a time out was called to verify the correct patient, procedure, equipment, support staff and site/side marked as required   Supporting Documentation  Indications:  pain   Procedure Details  Location: shoulder - R subacromial bursa  Preparation: Patient was prepped and draped in the usual sterile fashion  Needle gauge: 21G.  Approach: posterior  Medications administered: 80 mg methylPREDNISolone acetate 80 MG/ML; 2 mL lidocaine PF 1% 1 %  Patient tolerance: patient tolerated the procedure well with no immediate complications       Sabrina Alexander M.D.

## 2025-04-24 ENCOUNTER — OFFICE VISIT (OUTPATIENT)
Dept: ORTHOPEDIC SURGERY | Facility: CLINIC | Age: 50
End: 2025-04-24
Payer: COMMERCIAL

## 2025-04-24 VITALS — TEMPERATURE: 98.3 F | WEIGHT: 225.3 LBS | BODY MASS INDEX: 33.37 KG/M2 | HEIGHT: 69 IN

## 2025-04-24 DIAGNOSIS — Z98.890 S/P ROTATOR CUFF REPAIR: ICD-10-CM

## 2025-04-24 DIAGNOSIS — M75.41 IMPINGEMENT SYNDROME OF RIGHT SHOULDER: Primary | ICD-10-CM

## 2025-04-24 RX ORDER — METHYLPREDNISOLONE ACETATE 80 MG/ML
80 INJECTION, SUSPENSION INTRA-ARTICULAR; INTRALESIONAL; INTRAMUSCULAR; SOFT TISSUE
Status: COMPLETED | OUTPATIENT
Start: 2025-04-24 | End: 2025-04-24

## 2025-04-24 RX ORDER — LIDOCAINE HYDROCHLORIDE 10 MG/ML
2 INJECTION, SOLUTION EPIDURAL; INFILTRATION; INTRACAUDAL; PERINEURAL
Status: COMPLETED | OUTPATIENT
Start: 2025-04-24 | End: 2025-04-24

## 2025-04-24 RX ADMIN — LIDOCAINE HYDROCHLORIDE 2 ML: 10 INJECTION, SOLUTION EPIDURAL; INFILTRATION; INTRACAUDAL; PERINEURAL at 09:16

## 2025-04-24 RX ADMIN — METHYLPREDNISOLONE ACETATE 80 MG: 80 INJECTION, SUSPENSION INTRA-ARTICULAR; INTRALESIONAL; INTRAMUSCULAR; SOFT TISSUE at 09:16

## 2025-05-07 ENCOUNTER — TREATMENT (OUTPATIENT)
Dept: PHYSICAL THERAPY | Facility: CLINIC | Age: 50
End: 2025-05-07
Payer: COMMERCIAL

## 2025-05-07 DIAGNOSIS — R53.1 WEAKNESS GENERALIZED: ICD-10-CM

## 2025-05-07 DIAGNOSIS — M54.2 CERVICALGIA: ICD-10-CM

## 2025-05-07 DIAGNOSIS — G89.29 CHRONIC RIGHT SHOULDER PAIN: Primary | ICD-10-CM

## 2025-05-07 DIAGNOSIS — M25.511 CHRONIC RIGHT SHOULDER PAIN: Primary | ICD-10-CM

## 2025-05-07 PROCEDURE — 97161 PT EVAL LOW COMPLEX 20 MIN: CPT | Performed by: PHYSICAL THERAPIST

## 2025-05-07 PROCEDURE — 97110 THERAPEUTIC EXERCISES: CPT | Performed by: PHYSICAL THERAPIST

## 2025-05-07 PROCEDURE — 97112 NEUROMUSCULAR REEDUCATION: CPT | Performed by: PHYSICAL THERAPIST

## 2025-05-07 NOTE — PROGRESS NOTES
Patient: Elvis Vallejo  YOB: 1975  Date of Service: 5/7/2025    Chief Complaints: Right shoulder pain    Subjective:    History of Present Illness: Pt is seen in the office today with complaints of right shoulder pain he had a cuff repair in 2022 he had a repeat MRI last year that showed an intact repair he states he still has some pain in the anterior aspect.  He got some relief from the injection but not significant and not lasting he has seen physical therapy but he does admit that he works 2 jobs and he really does not have time to do the exercises        Allergies:   Allergies   Allergen Reactions    Peanut Oil Other (See Comments)     Peanut Dust- Eyes swelled, runny nose, sore throat       Medications:   Home Medications:  Current Outpatient Medications on File Prior to Visit   Medication Sig    atorvastatin (LIPITOR) 20 MG tablet Take 1 tablet by mouth Every Morning.    clotrimazole (LOTRIMIN) 1 % external solution Apply  topically to the appropriate area as directed 2 (Two) Times a Day. (Patient not taking: Reported on 4/24/2025)    dexlansoprazole (DEXILANT) 60 MG capsule Take 1 capsule by mouth Daily.    ipratropium (ATROVENT) 0.06 % nasal spray     lisinopril-hydrochlorothiazide (PRINZIDE,ZESTORETIC) 20-12.5 MG per tablet Take 1 tablet by mouth Every Morning.    loratadine (CLARITIN) 10 MG tablet Take 1 tablet by mouth Every Morning.    montelukast (SINGULAIR) 10 MG tablet Take 1 tablet by mouth Every Morning.    multivitamin with minerals tablet tablet Take 1 tablet by mouth Daily.    naproxen (NAPROSYN) 500 MG tablet Take 1 tablet by mouth 2 (Two) Times a Day.    Ryaltris 665-25 MCG/ACT suspension SPRAY 2 SPRAY INTO BOTH NOSTRILS ONCE A DAY ALLERGIC RHINITIS AND NASAL CONGESTION     No current facility-administered medications on file prior to visit.     Current Medications:  Scheduled Meds:  Continuous Infusions:No current facility-administered medications for this visit.    PRN  Meds:.    I have reviewed the patient's medical history in detail and updated the computerized patient record.  Review and summarization of old records include:    Past Medical History:   Diagnosis Date    Allergic     Anesthesia complication     urinary retention    Guillermo esophagus     Guillermo's esophagus     GERD (gastroesophageal reflux disease)     Hyperlipidemia     Hypertension     Nausea & vomiting 10/05/2016        Past Surgical History:   Procedure Laterality Date    COLONOSCOPY N/A 03/14/2017    Procedure: COLONOSCOPY with polypectomy ;  Surgeon: Tricia Addison MD;  Location: [facility];  Service:     COLONOSCOPY N/A 03/02/2020    Procedure: COLONOSCOPY with polypectomy;  Surgeon: Gloria Hoffman MD;  Location: [facility];  Service: Gastroenterology;  Laterality: N/A;  ascending colon polyp  rectal polyps x2  diverticulosis  hemorrhoids    ENDOSCOPY N/A 10/27/2016    Procedure: ESOPHAGOGASTRODUODENOSCOPY WITH BILE COLLECTION / RANDOM GASTRIC BIOPSIES,DUODENAL AND DISTAL ESOPHAGEAL BIOPSIES  AND ALFREDO TEST BIOPSY;  Surgeon: Tricia Addison MD;  Location: [facility];  Service:     ENDOSCOPY N/A 09/26/2017    Procedure: ESOPHAGOGASTRODUODENOSCOPY with biopsies, stomach biopsy for  ALFREDO test;  Surgeon: Tricia Addison MD;  Location: [facility];  Service:     ENDOSCOPY N/A 03/02/2020    Procedure: ESOPHAGOGASTRODUODENOSCOPY with biopsies;  Surgeon: Gloria Hoffman MD;  Location: [facility];  Service: Gastroenterology;  Laterality: N/A;  gastric biopsy  distal esophagus biopsy  gastritis  hiatal hernia  Guillermo's esophagus    ENDOSCOPY      HEMORRHOIDECTOMY      SHOULDER ARTHROSCOPY W/ ROTATOR CUFF REPAIR Right 04/20/2022    Procedure: SHOULDER ARTHROSCOPY WITH ROTATOR CUFF REPAIR, decompression, distal clavicle excision ;  Surgeon: Sabrina Alexander MD;  Location: [facility];  Service: Orthopedics;  Laterality: Right;    WISDOM TOOTH EXTRACTION          Social History     Occupational History    Not on file    Tobacco Use    Smoking status: Former     Types: Cigars     Quit date: 2015     Years since quitting: 10.3    Smokeless tobacco: Never    Tobacco comments:     only used 1-2 times a year   Vaping Use    Vaping status: Never Used   Substance and Sexual Activity    Alcohol use: Yes     Alcohol/week: 3.0 standard drinks of alcohol     Types: 3 Cans of beer per week    Drug use: No    Sexual activity: Defer      Social History     Social History Narrative    Not on file        Family History   Problem Relation Age of Onset    Heart attack Paternal Grandmother     Hypertension Paternal Grandfather     Heart attack Paternal Grandfather     Colon cancer Neg Hx     Colon polyps Neg Hx     Malig Hyperthermia Neg Hx        ROS: 14 point review of systems was performed and was negative except for documented findings in HPI and today's encounter.     Allergies:   Allergies   Allergen Reactions    Peanut Oil Other (See Comments)     Peanut Dust- Eyes swelled, runny nose, sore throat     Constitutional:  Denies fever, shaking or chills   Eyes:  Denies change in visual acuity   HENT:  Denies nasal congestion or sore throat   Respiratory:  Denies cough or shortness of breath   Cardiovascular:  Denies chest pain or severe LE edema   GI:  Denies abdominal pain, nausea, vomiting, bloody stools or diarrhea   Musculoskeletal:  Numbness, tingling, or loss of motor function only as noted above in history of present illness.  : Denies painful urination or hematuria  Integument:  Denies rash, lesion or ulceration   Neurologic:  Denies headache or focal weakness  Endocrine:  Denies lymphadenopathy  Psych:  Denies confusion or change in mental status   Hem:  Denies active bleeding      Physical Exam: 49 y.o. male  Wt Readings from Last 3 Encounters:   04/24/25 102 kg (225 lb 4.8 oz)   05/28/24 109 kg (240 lb)   04/26/24 102 kg (225 lb 12.8 oz)       There is no height or weight on file to calculate BMI.    There were no vitals filed for  this visit.  Vital signs reviewed.   General Appearance:    Alert, cooperative, in no acute distress                    Ortho exam    Physical exam of the right shoulder reveals no overlying skin changes no lymphedema no lymphadenopathy.  Patient has active flexion 180 with mild symptoms abduction is similar external rotation is to 50 and internal rotation to the upper lumbar spine with mild symptoms.  Patient has good rotator cuff strength 4+ over 5 with isometric strength testing with pain.  Patient has a positive impingement and a positive Hernadez sign.  Patient has good cervical range of motion which is full and asymptomatic no radicular symptoms.  Patient has a normal elbow exam.  Good distal pulses are present  Patient has pain with overhead activity and a positive Neer sign and a positive empty can sign , a positive drop arm and a definitive painful arc   He does have a very rounded shoulder forward head posture           Assessment: Post rotator cuff repair 2020 2 repeat MRI last year shows an intact repair he still hurting we have done conservative things I really think a lot of this is going to be postural I told him he does not have to have 30 minutes to do it he can do his exercise of 5 or 10 minutes while he is waiting certain areas I think he could figure out a way to do some of these exercises.  I do not think at this time this is an operative lesion I am going to see him back in July at that point I could reinject it or I guess we could repeat his MRI    Plan: As above  Follow up as indicated.  Ice, elevate, and rest as needed.  Discussed conservative measures of pain control including ice, bracing.   Sabrina Alexander M.D.

## 2025-05-07 NOTE — PROGRESS NOTES
Physical Therapy Initial Evaluation and Plan of Care      Patient: Elvis Vallejo   : 1975  Diagnosis/ICD-10 Code:  Chronic right shoulder pain [M25.511, G89.29]  Referring practitioner: Sabrina Alexander MD  Date of Initial Visit: 2025  Today's Date: 2025   Patient seen for 1 session  Location of Service: [place] [address]       Visit Diagnoses:    ICD-10-CM ICD-9-CM   1. Chronic right shoulder pain  M25.511 719.41    G89.29 338.29   2. Cervicalgia  M54.2 723.1   3. Weakness generalized  R53.1 780.79       Past Surgical History:   Procedure Laterality Date   • COLONOSCOPY N/A 2017    Procedure: COLONOSCOPY with polypectomy ;  Surgeon: Tricia Addison MD;  Location: [facility];  Service:    • COLONOSCOPY N/A 2020    Procedure: COLONOSCOPY with polypectomy;  Surgeon: Gloria Hoffman MD;  [facility];  Service: Gastroenterology;  Laterality: N/A;  ascending colon polyp  rectal polyps x2  diverticulosis  hemorrhoids   • ENDOSCOPY N/A 10/27/2016    Procedure: ESOPHAGOGASTRODUODENOSCOPY WITH BILE COLLECTION / RANDOM GASTRIC BIOPSIES,DUODENAL AND DISTAL ESOPHAGEAL BIOPSIES  AND ALFREDO TEST BIOPSY;  Surgeon: Tricia Addison MD;  Location: [facility];  Service:    • ENDOSCOPY N/A 2017    Procedure: ESOPHAGOGASTRODUODENOSCOPY with biopsies, stomach biopsy for  ALFREDO test;  Surgeon: Tricia Addison MD;  Location: [facility];  Service:    • ENDOSCOPY N/A 2020    Procedure: ESOPHAGOGASTRODUODENOSCOPY with biopsies;  Surgeon: Gloria Hoffman MD;  Location: [facility];  Service: Gastroenterology;  Laterality: N/A;  gastric biopsy  distal esophagus biopsy  gastritis  hiatal hernia  Guillermo's esophagus   • ENDOSCOPY     • HEMORRHOIDECTOMY     • SHOULDER ARTHROSCOPY W/ ROTATOR CUFF REPAIR Right 2022    Procedure: SHOULDER ARTHROSCOPY WITH ROTATOR CUFF REPAIR, decompression, distal clavicle excision ;  Surgeon:  Sabrina Alexander MD;  Location: [facility];  Service: Orthopedics;  Laterality: Right;   • WISDOM TOOTH EXTRACTION        Patient Active Problem List    Diagnosis Date Noted   • Traumatic incomplete tear of right rotator cuff 03/16/2022     Note Last Updated: 3/16/2022     Added automatically from request for surgery [id number]     • History of colonic polyps 11/14/2019     Note Last Updated: 11/14/2019     Added automatically from request for surgery [id number]     • Guillermo's esophagus without dysplasia 09/15/2017     Note Last Updated: 9/15/2017     Added automatically from request for surgery [id number]     • Gastroesophageal reflux disease with esophagitis 06/30/2017   • Small intestinal bacterial overgrowth 05/25/2017   • Positive lactulose breath hydrogen test 05/24/2017   • Right upper quadrant abdominal pain 10/05/2016   • Nausea & vomiting 10/05/2016   • Bloating 10/05/2016   • Hypertension 12/27/2013   • Epididymitis 09/14/2012      Past Medical History:   Diagnosis Date   • Allergic    • Anesthesia complication     urinary retention   • Guillermo esophagus    • Guillermo's esophagus    • GERD (gastroesophageal reflux disease)    • Hyperlipidemia    • Hypertension    • Nausea & vomiting 10/05/2016       Subjective  Chief Complaint/Subjective Report: Patient presented to the clinic today with complaints of pain in the right shoulder secondary to an event years ago that resulted in him having surgery that can be read about in the notes of his last two bouts of PT over the past few days years. Pt reported a PMH of shoulder issue and neck, see scanned and additional documents for further or additional PMH not mentioned at PT today. Pt made no reports of CNS signs or symptoms, or indications of other sinister pathologies were given in the subjective history today.   Mechanism of Injury:  Fall resulting in surgery - chronic  Functional Limitations: ADLs, work-related activities  Subjective Goals for PT: Return to PLOF,  decreased pain with ADLs and community activities  Prior Treatment for Current Condition: MD, Sapril, PT   Imaging:See scans   Pain/VNRS (0-10/10): Worst: 5-6/10; Average: 3/10  Agg. Factors:  Reaching overhead, lifting, reaching behind back, reaching cross body   Relieving Factors: Cold Shower, ice   Subjective Questionnaire: QuickDASH: 27%  PLOF: Independent with all functional tasks (transfers, ambulation, stair navigation, squatting, lifting, etc.), ADLs/ iADLs (bathing, dressing, house work, and other such tasks), and community activities (driving, shopping, wellness activities, etc.)   Occupation: Not referenced   PMH: See Subjective Report and scanned documentation   Precautions/Contraindications: No reported contraindications from subjective history today unless otherwise stated above.        Objective  AROM (% of Full --- * denotes degrees in place of % --- + denotes tested to be WFL)       -- Cervical Rotation Right:  80p Left:  90    -- Thoracic Rotation Right - Left -    -- Cervical Flexion:   85      -- Cervical Extension:  70p                   AROM Shoulder (% of Full --- * denotes degrees in place of % --- + denotes tested to be WFL)      -- Shoulder Flexion Right: 90p Left: +    -- Shoulder Abduction Right: 85 Left: 95     -- Shoulder ER at Side Right: 90 Left: +  -- Shoulder IR Behind Back: 80p Left: 90    UE MMT (0-5/5 --- + denotes WFL)  -- Shoulder Flexion Right: 4p/5 Left: 4+/5  -- Shoulder Abduction Right: 4+/5 Left: 5-/5  -- Shoulder ER at Side Right: 4+p/5 Left: 5-/5  -- Shoulder IR Behind Back: -/5 Left: -/5  -- Elbow Flexion Right: +/5 Left: +/5  -- Elbow Extension Right  +/5 Left: +/5  -- Wrist Flexion Right:  +/5 Left: +/5  -- Wrist Extension Right: +/5 Left: +/5      Functional Assessment: Impaired overhead reaching and lifting    Exercise and Interventions:  Access Code: [email or web address]  URL: [email or web address]  Date: 05/07/2025  Prepared by: Edwar Mead    Exercises  - Cervical  Extension AROM with Strap  - 1 x daily - 7 x weekly - 2-3 sets - 6-8 reps  - Mid/lower Cervical Rotation SNAG   - 1-2 x daily - 7 x weekly - 2-3 sets - 5-8 reps  - Standing Shoulder Horizontal Abduction with Resistance  - 1 x daily - 7 x weekly - 3 sets - 8 reps  - Thoracic Extension Mobilization on Foam Roll  - 1-2 x daily - 7 x weekly - 1 sets - 10-12 reps - 2-3s hold - 3 locations  - Radial nerve ball passes  - 1 x daily - 7 x weekly - 3 sets - 8 reps  - Therapeutic Neuroscience Education - Education on Pain Neuroscience, graded motor exposure, pain threshold, alarm system and training levels, and progressive loading - 10 mins      Documentation of Assessment Details: Patient presented the clinic with signs and symptoms consistent with somatic dysfunction of the cervical spine and UE. Patient demonstrated limitations and impairments mobility and strength during today's evaluation, and will benefit from skilled PT address current limitations and impairments to help patient regain functional mobility and strength necessary to return to PLOF, reduce pain, and improve current symptoms as patient progresses towards meeting current goals established at therapy today. The patient present with no comorbidities or personal factors that impact my POC and deficit in above mentioned areas. Based on these findings and results from valid tests and measures, I am classifying this patient's presentation as stable with uncomplicated characteristics, and a good prognosis for recovery.         Assessment & Plan       Assessment  Impairments: abnormal gait, abnormal or restricted ROM, activity intolerance, impaired physical strength, lacks appropriate home exercise program and pain with function   Functional limitations: carrying objects, lifting, sleeping, walking, uncomfortable because of pain, sitting and standing   Prognosis details: Based on valid tests and measures performed today I am classifying this patient as presently  stable with uncomplicated characteristics and good prognosis for recovery    Goals  Plan Goals: SHORT TERM GOALS (0-4 Weeks):  Pt will demonstrated independence and compliance with HEP within 2 weeks to demonstrate understanding of interventions and treatments helpful in reaching STG and LTG over the course of care.     Pt will improve Subjective assessment tool (NDI/QDASH) by >20% within 4 weeks to demonstrate improvements in test and measure outcomes and overall functionality, and to show reduction of symptoms, improved activity tolerance, and ability to complete ADLs and work-related activities     Pt will improve functional mobility and pain free ROM in the neck and shoulder to demonstrate improved functional capacity and independence with ADLs, driving and community participation activities.    Pt will reported pain <5/10 with ADLs and normal daily activities for >2 days/week over the past week to demonstrate functional improvements and activity tolerance and reduction in symptom severity     Pt will report a reduction of symptoms locations (I.e. head, interscapular, shoulder, arm, or neck regions) of 1 or more within 4 weeks to demonstrate a reduction in symptom irritability and severity and improved functional activity tolerance.       LONG TERM GOALS (6-8+ Weeks)   Pt will improve functional mobility and pain free ROM to ranges that allow for pain free functional activities such as driving, overhead reaching, dressing, bathing, and completing ADLs within 8 weeks to demonstrate improvements in functional independence, mobility, and community participation to allow for a return to PLOF.    Pt will demonstrate a 25% improvement in cervical and shoulder ROMs within 8 weeks to demonstrate improvements in functional mobility and ability to complete ADLs and work-related activities Independently.     Pt will reported pain <3/10 with ADLs and normal daily activities for >5 days/week over the past week to demonstrate  functional improvements and activity tolerance and reduction in symptom severity     Pt will improve Subjective assessment tool (NDI/QDASH) by >50%  over baseline to demonstrate improvements in test and measure outcomes and overall functionality, and to show reduction of symptoms, improved activity tolerance, and ability to complete ADLs and work-related activities     Pt will be able to lift and carry objects >30lbs without worsening of symptoms to demonstrate improvements in functional strength for ease of home and community tasks and improved functional independence.          Plan  Therapy options: will be seen for skilled therapy services  Planned modality interventions: dry needling, TENS, high voltage pulsed current (pain management), electrical stimulation/Russian stimulation and cryotherapy  Planned therapy interventions: ADL retraining, abdominal trunk stabilization, manual therapy, neuromuscular re-education, balance/weight-bearing training, body mechanics training, soft tissue mobilization, spinal/joint mobilization, joint mobilization, home exercise program, gait training, functional ROM exercises, strengthening, therapeutic activities and transfer training  Treatment plan discussed with: patient  Plan details: Duration: 2-3x/Wk for 4 Weeks - Upon completion of 4 weeks further evaluation and assessment will determine ongoing plan for continued care.    Continue with skilled physical therapy addressing previously mentioned limitations and impairments; progress HEP as tolerated; progress functional strengthening interventions to tolerance.    History # of Personal Factors and/or Comorbidities: LOW (0)  Examination of Body System(s): # of elements: LOW (1-2)  Clinical Presentation: STABLE   Clinical Decision Making: LOW       Timed:         Manual Therapy:    -     mins  96637;     Therapeutic Exercise:    15     mins  71685;     Neuromuscular Thao:    10    mins  65517;    Therapeutic Activity:     -      mins  02146;     Gait Training:           mins  69779;     Ultrasound:          mins  56418;    Ionto                                  mins   65867  Self Care                           mins   14731  Canalith Repos         mins 41370    Un-Timed:  Electrical Stimulation:          mins  04984 ( );  Dry Needling         mins self-pay  Traction         mins 68429  Low Eval    20     Mins  22911  Mod Eval         Mins  25071  High Eval                            Mins  83027    Timed Treatment:   25   mins   Total Treatment:     45   mins      PT: Edwar Mead PT     License Number: [id number]  Electronically signed by Edwar Mead PT, 05/07/25, 3:38 PM EDT    Certification Period: 5/7/2025 thru 8/4/2025  I certify that the therapy services are furnished while this patient is under my care.  The services outlined above are required by this patient, and will be reviewed every 90 days.         Physician Signature:__________________________________________________    PHYSICIAN: Sabrina Alexander MD  NPI: [id number]                                      DATE:      Please sign and return via fax to [email or web address] . Thank you, [facility].

## 2025-05-14 ENCOUNTER — TREATMENT (OUTPATIENT)
Dept: PHYSICAL THERAPY | Facility: CLINIC | Age: 50
End: 2025-05-14
Payer: COMMERCIAL

## 2025-05-14 DIAGNOSIS — R53.1 WEAKNESS GENERALIZED: ICD-10-CM

## 2025-05-14 DIAGNOSIS — M25.511 CHRONIC RIGHT SHOULDER PAIN: Primary | ICD-10-CM

## 2025-05-14 DIAGNOSIS — M54.2 CERVICALGIA: ICD-10-CM

## 2025-05-14 DIAGNOSIS — G89.29 CHRONIC RIGHT SHOULDER PAIN: Primary | ICD-10-CM

## 2025-05-14 PROCEDURE — 97530 THERAPEUTIC ACTIVITIES: CPT | Performed by: PHYSICAL THERAPIST

## 2025-05-14 PROCEDURE — 97112 NEUROMUSCULAR REEDUCATION: CPT | Performed by: PHYSICAL THERAPIST

## 2025-05-14 PROCEDURE — 97110 THERAPEUTIC EXERCISES: CPT | Performed by: PHYSICAL THERAPIST

## 2025-05-16 ENCOUNTER — TREATMENT (OUTPATIENT)
Dept: PHYSICAL THERAPY | Facility: CLINIC | Age: 50
End: 2025-05-16
Payer: COMMERCIAL

## 2025-05-16 DIAGNOSIS — R53.1 WEAKNESS GENERALIZED: ICD-10-CM

## 2025-05-16 DIAGNOSIS — M54.2 CERVICALGIA: ICD-10-CM

## 2025-05-16 DIAGNOSIS — G89.29 CHRONIC RIGHT SHOULDER PAIN: Primary | ICD-10-CM

## 2025-05-16 DIAGNOSIS — M25.511 CHRONIC RIGHT SHOULDER PAIN: Primary | ICD-10-CM

## 2025-05-16 PROCEDURE — 97110 THERAPEUTIC EXERCISES: CPT | Performed by: PHYSICAL THERAPIST

## 2025-05-16 PROCEDURE — 97530 THERAPEUTIC ACTIVITIES: CPT | Performed by: PHYSICAL THERAPIST

## 2025-05-16 PROCEDURE — 97112 NEUROMUSCULAR REEDUCATION: CPT | Performed by: PHYSICAL THERAPIST

## 2025-05-16 NOTE — PROGRESS NOTES
Physical Therapy Daily Note    Patient: Elvis Vallejo   : 1975  Diagnosis/ICD-10 Code:  Chronic right shoulder pain [M25.511, G89.29]  Referring practitioner: Sabrina Alexander MD  Date of Initial Visit: Type: THERAPY  Noted: 2025  Today's Date: 2025  Patient seen for 2 session  Location of Service: [place] [address]       Visit Diagnoses:    ICD-10-CM ICD-9-CM   1. Chronic right shoulder pain  M25.511 719.41    G89.29 338.29   2. Cervicalgia  M54.2 723.1   3. Weakness generalized  R53.1 780.79            Subjective  Elvis Vallejo reported today that he feels like things are improving. Still having a number of issues, but feels like it's going in the right direction    Objective   No functional measures updated at today's visit unless otherwise stated. For functional assessment and documentation of patient progressions referred to the assessment section.    See Exercise, Manual, and Modality Logs for complete treatments.       Assessment/Plan  Treatment today continued with emphasis of end range mobility interventions and functional strengthening to promoted improved functional mobility and independence with ADLs and community activities. Pt continues to report difficulty and limitations with shoulder mobility, though tolerance to interventions appears to be improving per gross assessment and patient reports in the clinic. Pt continues to have limitations in functional strength and mobility, and will continue to benefit from ongoing skilled PT to help pt continue to progress towards current LTGs and return to PLOF.    Plan: Continue with current treatment plan and progressions to reach goals established and previous evaluation/assessment         Timed:         Manual Therapy:         mins  52606;     Therapeutic Exercise:    30     mins  10270;     Neuromuscular Thao:    15    mins  18167;    Therapeutic Activity:     10     mins   00230;     Gait Training:           mins  49275;     Ultrasound:          mins  70756;    Ionto                                   mins   04769  Self Care                            mins   16201  Canalith Repos         mins 96163    Un-Timed:  Electrical Stimulation:         mins  35754 ( );  Dry Needling          mins self-pay  Traction          mins 68878  Low Eval          Mins  17860  Mod Eval          Mins  24053  High Eval                            Mins  22747      Timed Treatment:   55   mins   Total Treatment:     55   mins      PT: Edwar Mead PT     License Number: [id number]  Electronically signed by Edwar Mead PT, 05/16/25, 11:34 AM EDT

## 2025-05-19 ENCOUNTER — TREATMENT (OUTPATIENT)
Dept: PHYSICAL THERAPY | Facility: CLINIC | Age: 50
End: 2025-05-19
Payer: COMMERCIAL

## 2025-05-19 DIAGNOSIS — M54.2 CERVICALGIA: ICD-10-CM

## 2025-05-19 DIAGNOSIS — G89.29 CHRONIC RIGHT SHOULDER PAIN: Primary | ICD-10-CM

## 2025-05-19 DIAGNOSIS — R53.1 WEAKNESS GENERALIZED: ICD-10-CM

## 2025-05-19 DIAGNOSIS — M25.511 CHRONIC RIGHT SHOULDER PAIN: Primary | ICD-10-CM

## 2025-05-19 PROCEDURE — 97110 THERAPEUTIC EXERCISES: CPT | Performed by: PHYSICAL THERAPIST

## 2025-05-19 PROCEDURE — 97530 THERAPEUTIC ACTIVITIES: CPT | Performed by: PHYSICAL THERAPIST

## 2025-05-19 PROCEDURE — 97535 SELF CARE MNGMENT TRAINING: CPT | Performed by: PHYSICAL THERAPIST

## 2025-05-19 NOTE — PROGRESS NOTES
Physical Therapy Daily Treatment Note  [address], [address]  [place] [address]      Patient: Elvis Vallejo   : 1975  Referring practitioner: Sabrina Alexander MD  Date of Initial Visit: Type: THERAPY  Noted: 2025  Today's Date: 2025  Patient seen for 3 sessions       Visit Diagnoses:    ICD-10-CM ICD-9-CM   1. Chronic right shoulder pain  M25.511 719.41    G89.29 338.29   2. Cervicalgia  M54.2 723.1   3. Weakness generalized  R53.1 780.79           Subjective   Patient reports that the shoulder and neck are a little stiff, denies pain.    Objective   See Exercise, Manual, and Modality Logs for complete treatment.       Assessment/Plan  Subjective reports are good with regard to pain.  Patient performed the exercise routine without visual or verbal signs of pain in the right shoulder or cervical spine.  Patient asked about cupping, but instructed him to discuss this with his primary PT at his next visit.      Timed:         Manual Therapy:    0     mins  10958;     Therapeutic Exercise:    29     mins  75693;     Neuromuscular Thao:    0    mins  22247;    Therapeutic Activity:     8     mins  11756;     Gait Training      0    mins  86771;  Work Conditioning     0   mins  21609   Self Care  __8___ mins 92087      Untimed:  Electrical Stimulation:    0     mins  55835 ( );      Timed Treatment:   45   mins   Total Treatment:     45   mins    Diony Ny, PT  [place] License: [id number]

## 2025-05-19 NOTE — PROGRESS NOTES
Physical Therapy Daily Note    Patient: Elvis Vallejo   : 1975  Diagnosis/ICD-10 Code:  Chronic right shoulder pain [M25.511, G89.29]  Referring practitioner: Sabrina Alexander MD  Date of Initial Visit: Type: THERAPY  Noted: 2025  Today's Date: 2025  Patient seen for 3 session  Location of Service: [address] [place] [address]       Visit Diagnoses:    ICD-10-CM ICD-9-CM   1. Chronic right shoulder pain  M25.511 719.41    G89.29 338.29   2. Cervicalgia  M54.2 723.1   3. Weakness generalized  R53.1 780.79            Subjective  Elvis Vallejo reported today that he feels like things are improving. Still having a number of issues, but feels like it's going in the right direction    Objective   No functional measures updated at today's visit unless otherwise stated. For functional assessment and documentation of patient progressions referred to the assessment section.    See Exercise, Manual, and Modality Logs for complete treatments.       Assessment/Plan  Treatment today continued with emphasis of end range mobility interventions and functional strengthening to promoted improved functional mobility and independence with ADLs and community activities. Pt continues to report difficulty and limitations with cervicothoracic mobility and UE strength, though tolerance to interventions appears to be improving per gross assessment and patient reports in the clinic. Pt continues to have limitations in functional strength and mobility, and will continue to benefit from ongoing skilled PT to help pt continue to progress towards current LTGs and return to PLOF.    Plan: Continue with current treatment plan and progressions to reach goals established and previous evaluation/assessment         Timed:         Manual Therapy:         mins  23557;     Therapeutic Exercise:    25     mins  55037;     Neuromuscular Thao:    10    mins  07461;    Therapeutic  Activity:     10     mins  73809;     Gait Training:           mins  27102;     Ultrasound:          mins  71196;    Ionto                                   mins   68129  Self Care                            mins   50619  Canalith Repos         mins 43368    Un-Timed:  Electrical Stimulation:         mins  95390 ( );  Dry Needling          mins self-pay  Traction          mins 03516  Low Eval          Mins  58538  Mod Eval          Mins  64888  High Eval                            Mins  56011      Timed Treatment:   45   mins   Total Treatment:     45   mins      PT: Edwar Mead PT     License Number: [id number]  Electronically signed by Edwar Mead PT, 05/19/25, 1:18 PM EDT

## 2025-05-29 ENCOUNTER — OFFICE VISIT (OUTPATIENT)
Dept: ORTHOPEDIC SURGERY | Facility: CLINIC | Age: 50
End: 2025-05-29
Payer: COMMERCIAL

## 2025-05-29 VITALS — WEIGHT: 219 LBS | HEIGHT: 69 IN | BODY MASS INDEX: 32.44 KG/M2 | TEMPERATURE: 97.7 F

## 2025-05-29 DIAGNOSIS — M75.41 IMPINGEMENT SYNDROME OF RIGHT SHOULDER: Primary | ICD-10-CM

## 2025-05-29 DIAGNOSIS — Z98.890 S/P ROTATOR CUFF REPAIR: ICD-10-CM

## 2025-05-29 PROCEDURE — 99213 OFFICE O/P EST LOW 20 MIN: CPT | Performed by: ORTHOPAEDIC SURGERY

## 2025-06-04 ENCOUNTER — TREATMENT (OUTPATIENT)
Dept: PHYSICAL THERAPY | Facility: CLINIC | Age: 50
End: 2025-06-04
Payer: COMMERCIAL

## 2025-06-04 DIAGNOSIS — R53.1 WEAKNESS GENERALIZED: ICD-10-CM

## 2025-06-04 DIAGNOSIS — M25.511 CHRONIC RIGHT SHOULDER PAIN: Primary | ICD-10-CM

## 2025-06-04 DIAGNOSIS — G89.29 CHRONIC RIGHT SHOULDER PAIN: Primary | ICD-10-CM

## 2025-06-04 DIAGNOSIS — M54.2 CERVICALGIA: ICD-10-CM

## 2025-06-04 PROCEDURE — 97530 THERAPEUTIC ACTIVITIES: CPT | Performed by: PHYSICAL THERAPIST

## 2025-06-04 PROCEDURE — 97110 THERAPEUTIC EXERCISES: CPT | Performed by: PHYSICAL THERAPIST

## 2025-06-04 PROCEDURE — 97112 NEUROMUSCULAR REEDUCATION: CPT | Performed by: PHYSICAL THERAPIST

## 2025-06-09 NOTE — PROGRESS NOTES
Physical Therapy Daily Note    Patient: Elvis Vallejo   : 1975  Diagnosis/ICD-10 Code:  Chronic right shoulder pain [M25.511, G89.29]  Referring practitioner: Sabrina Alexander MD  Date of Initial Visit: Type: THERAPY  Noted: 2025  Today's Date: 2025  Patient seen for 5 session  Location of Service: [place] [address]       Visit Diagnoses:    ICD-10-CM ICD-9-CM   1. Chronic right shoulder pain  M25.511 719.41    G89.29 338.29   2. Cervicalgia  M54.2 723.1   3. Weakness generalized  R53.1 780.79            Subjective  Elvis Vallejo reported today that he feels like things are improving. Still having a number of issues, but feels like it's going in the right direction    Objective   No functional measures updated at today's visit unless otherwise stated. For functional assessment and documentation of patient progressions referred to the assessment section.    See Exercise, Manual, and Modality Logs for complete treatments.       Assessment/Plan  Treatment today continued with emphasis of end range mobility interventions and functional strengthening to promoted improved functional mobility and independence with ADLs and community activities. Pt continues to report difficulty and limitations with mobility of the shoulder, though tolerance to interventions appears to be improving per gross assessment and patient reports in the clinic. Pt continues to have limitations in functional strength and mobility, and will continue to benefit from ongoing skilled PT to help pt continue to progress towards current LTGs and return to PLOF.    Plan: Continue with current treatment plan and progressions to reach goals established and previous evaluation/assessment         Timed:         Manual Therapy:         mins  30472;     Therapeutic Exercise:    25     mins  51043;     Neuromuscular Thao:    15    mins  13666;    Therapeutic Activity:     10      mins  15202;     Gait Training:           mins  46096;     Ultrasound:          mins  84561;    Ionto                                   mins   46007  Self Care                            mins   22643  Canalith Repos         mins 99786    Un-Timed:  Electrical Stimulation:         mins  67228 ( );  Dry Needling          mins self-pay  Traction          mins 62118  Low Eval          Mins  75675  Mod Eval          Mins  96599  High Eval                            Mins  00328      Timed Treatment:   50   mins   Total Treatment:     50   mins      PT: Edwar Mead PT     License Number: [id number]  Electronically signed by Edwar Mead PT, 06/09/25, 8:04 AM EDT

## 2025-06-11 ENCOUNTER — TREATMENT (OUTPATIENT)
Dept: PHYSICAL THERAPY | Facility: CLINIC | Age: 50
End: 2025-06-11
Payer: COMMERCIAL

## 2025-06-11 DIAGNOSIS — G89.29 CHRONIC RIGHT SHOULDER PAIN: Primary | ICD-10-CM

## 2025-06-11 DIAGNOSIS — R53.1 WEAKNESS GENERALIZED: ICD-10-CM

## 2025-06-11 DIAGNOSIS — M54.2 CERVICALGIA: ICD-10-CM

## 2025-06-11 DIAGNOSIS — M25.511 CHRONIC RIGHT SHOULDER PAIN: Primary | ICD-10-CM

## 2025-06-11 PROCEDURE — 97530 THERAPEUTIC ACTIVITIES: CPT | Performed by: PHYSICAL THERAPIST

## 2025-06-11 PROCEDURE — 97110 THERAPEUTIC EXERCISES: CPT | Performed by: PHYSICAL THERAPIST

## 2025-06-11 PROCEDURE — 97112 NEUROMUSCULAR REEDUCATION: CPT | Performed by: PHYSICAL THERAPIST

## 2025-06-17 NOTE — PROGRESS NOTES
Physical Therapy Daily Note    Patient: Elvis Vallejo   : 1975  Diagnosis/ICD-10 Code:  Chronic right shoulder pain [M25.511, G89.29]  Referring practitioner: Sabrina Alexander MD  Date of Initial Visit: Type: THERAPY  Noted: 2025  Today's Date: 2025  Patient seen for 6 session  Location of Service: [place] [address]       Visit Diagnoses:    ICD-10-CM ICD-9-CM   1. Chronic right shoulder pain  M25.511 719.41    G89.29 338.29   2. Cervicalgia  M54.2 723.1   3. Weakness generalized  R53.1 780.79            Subjective  Elvis Vallejo reported today that he feels like things are improving. Still having a number of issues, but feels like it's going in the right direction    Objective   No functional measures updated at today's visit unless otherwise stated. For functional assessment and documentation of patient progressions referred to the assessment section.    See Exercise, Manual, and Modality Logs for complete treatments.       Assessment/Plan  Treatment today continued with emphasis of end range mobility interventions and functional strengthening to promoted improved functional mobility and independence with ADLs and community activities. Pt continues to report difficulty and limitations with overhead lifting, though tolerance to interventions appears to be improving per gross assessment and patient reports in the clinic. Pt continues to have limitations in functional strength and mobility, and will continue to benefit from ongoing skilled PT to help pt continue to progress towards current LTGs and return to PLOF.    Plan: Continue with current treatment plan and progressions to reach goals established and previous evaluation/assessment         Timed:         Manual Therapy:         mins  46087;     Therapeutic Exercise:    30     mins  01050;     Neuromuscular Thao:    15    mins  75893;    Therapeutic Activity:     10     mins   96033;     Gait Training:           mins  87597;     Ultrasound:          mins  09001;    Ionto                                   mins   87525  Self Care                            mins   44189  Canalith Repos         mins 71136    Un-Timed:  Electrical Stimulation:         mins  75919 ( );  Dry Needling          mins self-pay  Traction          mins 20445  Low Eval          Mins  66213  Mod Eval          Mins  75824  High Eval                            Mins  97151      Timed Treatment:   55   mins   Total Treatment:     55   mins      PT: Edwar Mead PT     License Number: [id number]  Electronically signed by Edwar Mead PT, 06/17/25, 3:11 PM EDT

## 2025-06-18 ENCOUNTER — TREATMENT (OUTPATIENT)
Dept: PHYSICAL THERAPY | Facility: CLINIC | Age: 50
End: 2025-06-18
Payer: COMMERCIAL

## 2025-06-18 DIAGNOSIS — M54.2 CERVICALGIA: ICD-10-CM

## 2025-06-18 DIAGNOSIS — R53.1 WEAKNESS GENERALIZED: ICD-10-CM

## 2025-06-18 DIAGNOSIS — M25.511 CHRONIC RIGHT SHOULDER PAIN: Primary | ICD-10-CM

## 2025-06-18 DIAGNOSIS — G89.29 CHRONIC RIGHT SHOULDER PAIN: Primary | ICD-10-CM

## 2025-06-18 PROCEDURE — 97112 NEUROMUSCULAR REEDUCATION: CPT | Performed by: PHYSICAL THERAPIST

## 2025-06-18 PROCEDURE — 97530 THERAPEUTIC ACTIVITIES: CPT | Performed by: PHYSICAL THERAPIST

## 2025-06-18 PROCEDURE — 97110 THERAPEUTIC EXERCISES: CPT | Performed by: PHYSICAL THERAPIST

## 2025-06-18 PROCEDURE — 97164 PT RE-EVAL EST PLAN CARE: CPT | Performed by: PHYSICAL THERAPIST

## 2025-06-25 ENCOUNTER — TREATMENT (OUTPATIENT)
Dept: PHYSICAL THERAPY | Facility: CLINIC | Age: 50
End: 2025-06-25
Payer: COMMERCIAL

## 2025-06-25 DIAGNOSIS — M54.2 CERVICALGIA: ICD-10-CM

## 2025-06-25 DIAGNOSIS — M25.511 CHRONIC RIGHT SHOULDER PAIN: Primary | ICD-10-CM

## 2025-06-25 DIAGNOSIS — R53.1 WEAKNESS GENERALIZED: ICD-10-CM

## 2025-06-25 DIAGNOSIS — G89.29 CHRONIC RIGHT SHOULDER PAIN: Primary | ICD-10-CM

## 2025-06-25 PROCEDURE — 97530 THERAPEUTIC ACTIVITIES: CPT | Performed by: PHYSICAL THERAPIST

## 2025-06-25 PROCEDURE — 97110 THERAPEUTIC EXERCISES: CPT | Performed by: PHYSICAL THERAPIST

## 2025-06-25 PROCEDURE — 97112 NEUROMUSCULAR REEDUCATION: CPT | Performed by: PHYSICAL THERAPIST

## 2025-06-25 NOTE — PROGRESS NOTES
Re-Assessment / Re-Certification    Patient: Elvis Vallejo   : 1975  Diagnosis/ICD-10 Code:  Chronic right shoulder pain [M25.511, G89.29]  Referring practitioner: Sabrina Alexander MD  Date of Initial Visit: Type: THERAPY  Noted: 2025  Today's Date: 2025   Patient seen for 7 session  Location of Service: [facility]   [address]   [place] [address]       Visit Diagnoses:    ICD-10-CM ICD-9-CM   1. Chronic right shoulder pain  M25.511 719.41    G89.29 338.29   2. Cervicalgia  M54.2 723.1   3. Weakness generalized  R53.1 780.79       Subjective:     Subjective Questionnaire: QuickDASH: 22%  Clinical Progress: improved  Home Program Compliance: Yes  Treatment has included: therapeutic exercise, neuromuscular re-education, manual therapy, and therapeutic activity  NPRS: Worst: 5/10, Average: 2/10    Subjective   Elvis Vallejo reported today that he's feeling stronger and more tolerance to activities and work; still having some aching in the front of the shoulder and weakness at times though     Objective   AROM (% of Full --- * denotes degrees in place of % --- + denotes tested to be WFL)                                                         -- Cervical Rotation Right:      85      Left:     90      Pain with overpressure on the R                  -- Thoracic Rotation Right       -           Left      -                         -- Cervical Flexion:                  85                                              -- Cervical Extension:              80p                                                                                                                                                                                  AROM Shoulder (% of Full --- * denotes degrees in place of % --- + denotes tested to be WFL)                                        -- Shoulder Flexion Right:       90p      Left:     +     Improved symptoms with repeated testing/warm-up                      -- Shoulder Abduction Right:  85        Left:     95                                  -- Shoulder ER at Side Right: 90        Left:     +  -- Shoulder IR Behind Back:   85p      Left:     90     UE MMT (0-5/5 --- + denotes WFL)  -- Shoulder Flexion Right:       4+/5Left:     5-/5  -- Shoulder Abduction Right:  5-/5Left:     5/5  -- Shoulder ER at Side Right: 4+p/5Left:     5-/5  -- Shoulder IR Behind Back:   -/5Left:     -/5  -- Elbow Flexion Right:           +/5Left:     +/5  -- Elbow Extension Right        +/5Left:     +/5  -- Wrist Flexion Right:             +/5Left:     +/5  -- Wrist Extension Right:         +/5Left:     +/5        Functional Assessment: Impaired overhead reaching and lifting    See Exercise, Manual, and Modality Logs for complete treatment.     Assessment/Plan  Patient demonstrated improvements in mobility and UE strength at today's re-evaluation and is progressing well towards current goals under current treatment plan. Tx today continue with emphasis of progressive functional strengthening for the UE and CT mobility, which the patient tolerated well and without complaint. VC and tactile cueing were provided with all interventions as needed during today's session. Patient's HEP was updated and progressed at the end of today's treatment with patient demonstrating good understanding Patient continues to have some limitations and impairments in the aforementioned areas and will continue to benefit from skilled PT to help patient regain functional mobility and return to PLOF by meeting all LTGs.      Progress toward previous goals: Partially Met    -    New Goals  Short-term goals (STG): -  Long-term goals (LTG): -      Recommendations: Continue as planned following plan implemented at IE, with progressions towards goals est at IE  Timeframe: 1 month  Prognosis to achieve goals: good    PT: Edwar Mead PT     License Number: [id number]  Electronically signed by Edwar Mead PT,  06/25/25, 11:06 AM EDT      Based upon review of the patient's progress and continued therapy plan, it is my medical opinion that Elvis Vallejo should continue physical therapy treatment at [facility]  [address] [place] [address] [phone].    Signature: __________________________________  Sabrina Alexander MD  PHYSICIAN: Sabrina Alexander MD  NPI: [id number]                                     DATE:     Timed:         Manual Therapy:    -     mins  34255;     Therapeutic Exercise:    20     mins  54971;     Neuromuscular Thao:    10    mins  06702;    Therapeutic Activity:     10     mins  15062;     Gait Training:           mins  20298;     Ultrasound:          mins  55035;    Ionto                                  mins   84468  Self Care                           mins   50102  Canalith Repos         mins 10413    Un-Timed:  Electrical Stimulation:          mins  11501 ( );  Dry Needling         mins self-pay  Traction         mins 62602  Re-Evaluation    10    Mins  76340      Timed Treatment:   40   mins   Total Treatment:     50   mins      Please sign and return via fax to [email or web address] . Thank you, [facility].

## 2025-06-25 NOTE — PROGRESS NOTES
Physical Therapy Daily Note    Patient: Elvis Vallejo   : 1975  Diagnosis/ICD-10 Code:  Chronic right shoulder pain [M25.511, G89.29]  Referring practitioner: Sabrina Alexander MD  Date of Initial Visit: Type: THERAPY  Noted: 2025  Today's Date: 2025   Patient seen for 7 session  Location of Service: [address] [place] [address]       Visit Diagnoses:    ICD-10-CM ICD-9-CM   1. Chronic right shoulder pain  M25.511 719.41    G89.29 338.29   2. Cervicalgia  M54.2 723.1   3. Weakness generalized  R53.1 780.79            Subjective  Elvis Vallejo reported today that he's    Objective   No functional measures updated at today's visit unless otherwise stated. For functional assessment and documentation of patient progressions referred to the assessment section.    See Exercise, Manual, and Modality Logs for complete treatments.       Assessment/Plan  ***    {DSPlan:80656}         Timed:         Manual Therapy:    ***     mins  26210;     Therapeutic Exercise:    ***     mins  63390;     Neuromuscular Thao:    ***    mins  86103;    Therapeutic Activity:     ***     mins  78357;     Gait Training:           mins  55793;     Ultrasound:          mins  04936;    Ionto                                   mins   28972  Self Care                            mins   23292  Canalith Repos         mins 27303    Un-Timed:  Electrical Stimulation:         mins  52110 ( );  Dry Needling     ***     mins self-pay  Traction          mins 10676  Low Eval          Mins  32854  Mod Eval          Mins  20977  High Eval                            Mins  40051      Timed Treatment:   ***   mins   Total Treatment:     ***   mins      PT: Edwar Mead PT     License Number: [id number]  Electronically signed by Edwar Mead PT, 25, 10:12 AM EDT

## 2025-06-27 NOTE — PROGRESS NOTES
Physical Therapy Daily Note    Patient: Elvis Vallejo   : 1975  Diagnosis/ICD-10 Code:  Chronic right shoulder pain [M25.511, G89.29]  Referring practitioner: Sabrina Alexander MD  Date of Initial Visit: Type: THERAPY  Noted: 2025  Today's Date: 2025  Patient seen for 8 session  Location of Service: [place] [address]       Visit Diagnoses:    ICD-10-CM ICD-9-CM   1. Chronic right shoulder pain  M25.511 719.41    G89.29 338.29   2. Cervicalgia  M54.2 723.1   3. Weakness generalized  R53.1 780.79            Subjective  Elvis Vallejo reported today that he feels like things are improving. Still having a number of issues, but feels like it's going in the right direction    Objective   No functional measures updated at today's visit unless otherwise stated. For functional assessment and documentation of patient progressions referred to the assessment section.    See Exercise, Manual, and Modality Logs for complete treatments.       Assessment/Plan  Treatment today continued with emphasis of end range mobility interventions and functional strengthening to promoted improved functional mobility and independence with ADLs and community activities. Pt continues to report difficulty and limitations with UE mobility, though tolerance to interventions appears to be improving per gross assessment and patient reports in the clinic. Pt continues to have limitations in functional strength and mobility, and will continue to benefit from ongoing skilled PT to help pt continue to progress towards current LTGs and return to PLOF.    Plan: Continue with current treatment plan and progressions to reach goals established and previous evaluation/assessment         Timed:         Manual Therapy:         mins  36541;     Therapeutic Exercise:    30     mins  67524;     Neuromuscular Thao:    15    mins  96962;    Therapeutic Activity:     10     mins  14916;      Gait Training:           mins  69710;     Ultrasound:          mins  13855;    Ionto                                   mins   23335  Self Care                            mins   76646  Canalith Repos         mins 05576    Un-Timed:  Electrical Stimulation:         mins  58882 ( );  Dry Needling          mins self-pay  Traction          mins 78081  Low Eval          Mins  92274  Mod Eval          Mins  35397  High Eval                            Mins  08211      Timed Treatment:   55   mins   Total Treatment:     55   mins      PT: Edwar Mead PT     License Number: [id number]  Electronically signed by Edwar Mead PT, 06/27/25, 4:25 PM EDT

## 2025-07-02 ENCOUNTER — TREATMENT (OUTPATIENT)
Dept: PHYSICAL THERAPY | Facility: CLINIC | Age: 50
End: 2025-07-02
Payer: COMMERCIAL

## 2025-07-03 NOTE — PROGRESS NOTES
Physical Therapy Daily Note    Patient: Elvis Vallejo   : 1975  Diagnosis/ICD-10 Code:  No primary diagnosis found.  Referring practitioner: Sabrina Alexander MD  Date of Initial Visit: Type: THERAPY  Noted: 2025  Today's Date: 2025  Patient seen for 9 session  Location of Service: [facility]   [address]   [place] [address]       Visit Diagnoses:  No diagnosis found.         Subjective  Elvis Valljeo reported today that he feels like things are improving. Still having a number of issues, but feels like it's going in the right direction    Objective   No functional measures updated at today's visit unless otherwise stated. For functional assessment and documentation of patient progressions referred to the assessment section.    See Exercise, Manual, and Modality Logs for complete treatments.       Assessment/Plan  Treatment today continued with emphasis of end range mobility interventions and functional strengthening to promoted improved functional mobility and independence with ADLs and community activities. Pt continues to report difficulty and limitations with end range shoulder mobility and overhead strength, though tolerance to interventions appears to be improving per gross assessment and patient reports in the clinic. Pt continues to have limitations in functional strength and mobility, and will continue to benefit from ongoing skilled PT to help pt continue to progress towards current LTGs and return to PLOF.    Plan: Continue with current treatment plan and progressions to reach goals established and previous evaluation/assessment         Timed:         Manual Therapy:         mins  40955;     Therapeutic Exercise:    30     mins  14851;     Neuromuscular Thao:    15    mins  99794;    Therapeutic Activity:     10     mins  76197;     Gait Training:           mins  55895;     Ultrasound:          mins  18915;    Ionto                                    mins   48109  Self Care                            mins   41310  Canalith Repos         mins 82305    Un-Timed:  Electrical Stimulation:         mins  99029 ( );  Dry Needling          mins self-pay  Traction          mins 87234  Low Eval          Mins  67874  Mod Eval          Mins  77154  High Eval                            Mins  08297      Timed Treatment:   55   mins   Total Treatment:     55   mins      PT: Edwar Mead PT     License Number: [id number]  Electronically signed by Edwar Mead PT, 07/03/25, 8:25 AM EDT

## 2025-07-23 ENCOUNTER — TREATMENT (OUTPATIENT)
Dept: PHYSICAL THERAPY | Facility: CLINIC | Age: 50
End: 2025-07-23
Payer: COMMERCIAL

## 2025-07-23 DIAGNOSIS — G89.29 CHRONIC RIGHT SHOULDER PAIN: Primary | ICD-10-CM

## 2025-07-23 DIAGNOSIS — M54.2 CERVICALGIA: ICD-10-CM

## 2025-07-23 DIAGNOSIS — M25.511 CHRONIC RIGHT SHOULDER PAIN: Primary | ICD-10-CM

## 2025-07-23 DIAGNOSIS — R53.1 WEAKNESS GENERALIZED: ICD-10-CM

## 2025-07-23 PROCEDURE — 97530 THERAPEUTIC ACTIVITIES: CPT | Performed by: PHYSICAL THERAPIST

## 2025-07-23 PROCEDURE — 97110 THERAPEUTIC EXERCISES: CPT | Performed by: PHYSICAL THERAPIST

## 2025-07-23 PROCEDURE — 97112 NEUROMUSCULAR REEDUCATION: CPT | Performed by: PHYSICAL THERAPIST

## 2025-07-24 NOTE — PROGRESS NOTES
Physical Therapy Daily Note    Patient: Elvis Vallejo   : 1975  Diagnosis/ICD-10 Code:  No primary diagnosis found.  Referring practitioner: Sabrina Alexander MD  Date of Initial Visit: Type: THERAPY  Noted: 2025  Today's Date: 2025  Patient seen for 10 session  Location of Service: [facility] [address]   [place] [address]       Visit Diagnoses:  No diagnosis found.         Subjective  Elvis Vallejo reported today that he is doing well, shoulder feels like it's been pretty stagnant over the past few weeks    Objective   No functional measures updated at today's visit unless otherwise stated. For functional assessment and documentation of patient progressions referred to the assessment section.    See Exercise, Manual, and Modality Logs for complete treatments.       Assessment/Plan  Treatment today continued with progressive strengthening and dynamic stability. Pt continues to have difficulty with overhead strength, most notable with pulling and pressing motions. Pt continues to have limitations in functional strength and mobility, and will continue to benefit from ongoing skilled PT to help pt continue to progress towards current LTGs and return to PLOF.    Plan: Continue with current treatment plan and progressions to reach goals established and previous evaluation/assessment         Timed:         Manual Therapy:         mins  27995;     Therapeutic Exercise:    20     mins  71776;     Neuromuscular Thao:    10    mins  47790;    Therapeutic Activity:     10     mins  08468;     Gait Training:           mins  65848;     Ultrasound:          mins  28347;    Ionto                                   mins   42934  Self Care                            mins   11923  Canalith Repos         mins 54919    Un-Timed:  Electrical Stimulation:         mins  48769 ( );  Dry Needling          mins self-pay  Traction          mins 08004  Low  Eval          Mins  47074  Mod Eval          Mins  14643  High Eval                            Mins  96413      Timed Treatment:   40   mins   Total Treatment:     40   mins      PT: Edwar Mead PT     License Number: [id number]  Electronically signed by Edwar Mead PT, 07/24/25, 4:32 PM EDT

## 2025-07-30 ENCOUNTER — TREATMENT (OUTPATIENT)
Dept: PHYSICAL THERAPY | Facility: CLINIC | Age: 50
End: 2025-07-30
Payer: COMMERCIAL

## 2025-07-30 DIAGNOSIS — G89.29 CHRONIC RIGHT SHOULDER PAIN: Primary | ICD-10-CM

## 2025-07-30 DIAGNOSIS — R53.1 WEAKNESS GENERALIZED: ICD-10-CM

## 2025-07-30 DIAGNOSIS — M25.511 CHRONIC RIGHT SHOULDER PAIN: Primary | ICD-10-CM

## 2025-07-30 DIAGNOSIS — M54.2 CERVICALGIA: ICD-10-CM

## 2025-07-30 PROCEDURE — 97110 THERAPEUTIC EXERCISES: CPT | Performed by: PHYSICAL THERAPIST

## 2025-07-30 PROCEDURE — 97530 THERAPEUTIC ACTIVITIES: CPT | Performed by: PHYSICAL THERAPIST

## 2025-07-30 PROCEDURE — 97112 NEUROMUSCULAR REEDUCATION: CPT | Performed by: PHYSICAL THERAPIST

## 2025-07-31 NOTE — PROGRESS NOTES
Physical Therapy Daily Note    Patient: Elvis Vallejo   : 1975  Diagnosis/ICD-10 Code:  Chronic right shoulder pain [M25.511, G89.29]  Referring practitioner: Sabrina Alexander MD  Date of Initial Visit: Type: THERAPY  Noted: 2025  Today's Date: 2025  Patient seen for 11 session  Location of Service: [facility] [address]  - [address]   [place] [address]       Visit Diagnoses:    ICD-10-CM ICD-9-CM   1. Chronic right shoulder pain  M25.511 719.41    G89.29 338.29   2. Cervicalgia  M54.2 723.1   3. Weakness generalized  R53.1 780.79            Subjective  Elvis Vallejo reported today that he feels like things are doing a little better, still doesn't feel 100% right though   Objective   No functional measures updated at today's visit unless otherwise stated. For functional assessment and documentation of patient progressions referred to the assessment section.    See Exercise, Manual, and Modality Logs for complete treatments.       Assessment/Plan  Treatment today continued with progressive strengthening and dynamic stability with overhead activities. Pt cont to demonstrated good tolerance to CKC interventions, cont to have limitations with end range stability and overhead lifting with [facility] interventions. Pt continues to have limitations in functional strength and mobility, and will continue to benefit from ongoing skilled PT to help pt continue to progress towards current LTGs and return to PLOF.    Plan: Continue with current treatment plan and progressions to reach goals established and previous evaluation/assessment         Timed:         Manual Therapy:         mins  62094;     Therapeutic Exercise:    20     mins  51102;     Neuromuscular Thao:    15    mins  63862;    Therapeutic Activity:     10     mins  13236;     Gait Training:           mins  83999;     Ultrasound:          mins  57827;    Ionto                                   mins    21493  Self Care                            mins   93411  Canalith Repos         mins 48414    Un-Timed:  Electrical Stimulation:         mins  98028 ( );  Dry Needling          mins self-pay  Traction          mins 59260  Low Eval          Mins  65963  Mod Eval          Mins  65480  High Eval                            Mins  13243      Timed Treatment:   45   mins   Total Treatment:     45   mins      PT: Edwar Mead PT     License Number: [id number]  Electronically signed by Edwar Mead PT, 07/31/25, 3:20 PM EDT

## 2025-08-06 ENCOUNTER — TREATMENT (OUTPATIENT)
Dept: PHYSICAL THERAPY | Facility: CLINIC | Age: 50
End: 2025-08-06
Payer: COMMERCIAL

## 2025-08-06 DIAGNOSIS — G89.29 CHRONIC RIGHT SHOULDER PAIN: Primary | ICD-10-CM

## 2025-08-06 DIAGNOSIS — R53.1 WEAKNESS GENERALIZED: ICD-10-CM

## 2025-08-06 DIAGNOSIS — M54.2 CERVICALGIA: ICD-10-CM

## 2025-08-06 DIAGNOSIS — M25.511 CHRONIC RIGHT SHOULDER PAIN: Primary | ICD-10-CM

## 2025-08-07 ENCOUNTER — OFFICE VISIT (OUTPATIENT)
Dept: ORTHOPEDIC SURGERY | Facility: CLINIC | Age: 50
End: 2025-08-07
Payer: COMMERCIAL

## 2025-08-07 VITALS — WEIGHT: 223 LBS | BODY MASS INDEX: 33.03 KG/M2 | HEIGHT: 69 IN | TEMPERATURE: 98.3 F

## 2025-08-07 DIAGNOSIS — M75.41 IMPINGEMENT SYNDROME OF RIGHT SHOULDER: Primary | ICD-10-CM

## 2025-08-07 RX ORDER — LIDOCAINE HYDROCHLORIDE 10 MG/ML
2 INJECTION, SOLUTION EPIDURAL; INFILTRATION; INTRACAUDAL; PERINEURAL
Status: COMPLETED | OUTPATIENT
Start: 2025-08-07 | End: 2025-08-07

## 2025-08-07 RX ORDER — METHYLPREDNISOLONE ACETATE 80 MG/ML
80 INJECTION, SUSPENSION INTRA-ARTICULAR; INTRALESIONAL; INTRAMUSCULAR; SOFT TISSUE
Status: COMPLETED | OUTPATIENT
Start: 2025-08-07 | End: 2025-08-07

## 2025-08-07 RX ADMIN — METHYLPREDNISOLONE ACETATE 80 MG: 80 INJECTION, SUSPENSION INTRA-ARTICULAR; INTRALESIONAL; INTRAMUSCULAR; SOFT TISSUE at 08:10

## 2025-08-07 RX ADMIN — LIDOCAINE HYDROCHLORIDE 2 ML: 10 INJECTION, SOLUTION EPIDURAL; INFILTRATION; INTRACAUDAL; PERINEURAL at 08:10

## 2025-08-27 ENCOUNTER — TREATMENT (OUTPATIENT)
Dept: PHYSICAL THERAPY | Facility: CLINIC | Age: 50
End: 2025-08-27
Payer: COMMERCIAL

## 2025-08-27 DIAGNOSIS — M54.2 CERVICALGIA: ICD-10-CM

## 2025-08-27 DIAGNOSIS — M25.511 CHRONIC RIGHT SHOULDER PAIN: Primary | ICD-10-CM

## 2025-08-27 DIAGNOSIS — G89.29 CHRONIC RIGHT SHOULDER PAIN: Primary | ICD-10-CM

## 2025-08-27 DIAGNOSIS — R53.1 WEAKNESS GENERALIZED: ICD-10-CM

## 2025-08-27 PROCEDURE — 97112 NEUROMUSCULAR REEDUCATION: CPT | Performed by: PHYSICAL THERAPIST

## 2025-08-27 PROCEDURE — 97530 THERAPEUTIC ACTIVITIES: CPT | Performed by: PHYSICAL THERAPIST

## 2025-08-27 PROCEDURE — 97110 THERAPEUTIC EXERCISES: CPT | Performed by: PHYSICAL THERAPIST

## (undated) DEVICE — MASK,FACE,FLUID RESIST,SHLD,EARLOOP: Brand: MEDLINE

## (undated) DEVICE — SYR LUER SLPTP 50ML

## (undated) DEVICE — SUT VIC 2/0 CT2 27IN J269H

## (undated) DEVICE — PK ARTHSCP SHLDR TOWER 40

## (undated) DEVICE — KT POSTN ARM TRIMANO BEACH CHR W/DRP

## (undated) DEVICE — 3M™ STERI-STRIP™ REINFORCED ADHESIVE SKIN CLOSURES, R1547, 1/2 IN X 4 IN (12 MM X 100 MM), 6 STRIPS/ENVELOPE: Brand: 3M™ STERI-STRIP™

## (undated) DEVICE — VIAL FORMALIN CAP 10P 40ML

## (undated) DEVICE — TRAP FLD MINIVAC MEGADYNE 100ML

## (undated) DEVICE — THE BITE BLOCK MAXI, LATEX FREE STRAP IS USED TO PROTECT THE ENDOSCOPE INSERTION TUBE FROM BEING BITTEN BY THE PATIENT.

## (undated) DEVICE — ENDOSCOPY PORT CONNECTOR FOR OLYMPUS® SCOPES: Brand: ERBE

## (undated) DEVICE — GOWN ISOL W/THUMB UNIV BLU BX/15

## (undated) DEVICE — Device

## (undated) DEVICE — Device: Brand: DEFENDO AIR/WATER/SUCTION AND BIOPSY VALVE

## (undated) DEVICE — TBG ARTHSCP PUMP W CONN/REDUC 8FT

## (undated) DEVICE — JACKT LAB KNIT COLR LG BLU

## (undated) DEVICE — BLD SHAVER BONECUTTER 5MM 13CM

## (undated) DEVICE — FRCP BX RADJAW4 NDL 2.8 240CM LG OG BX40

## (undated) DEVICE — SPNG GZ WOVN 4X4IN 12PLY 10/BX STRL

## (undated) DEVICE — HYBRID TUBING/CAP SET FOR OLYMPUS® SCOPES: Brand: ERBE

## (undated) DEVICE — MASK,FACE,SHIELD,BLUE,ANTI FOG,TIES: Brand: MEDLINE

## (undated) DEVICE — DRAPE,SHOULDER,BEACH ULTRAGARD: Brand: MEDLINE

## (undated) DEVICE — GLV SURG SENSICARE MICRO PF LF 6 STRL

## (undated) DEVICE — UNDYED BRAIDED (POLYGLACTIN 910), SYNTHETIC ABSORBABLE SUTURE: Brand: COATED VICRYL

## (undated) DEVICE — SYR LL 3CC

## (undated) DEVICE — GLV SURG SENSICARE W/ALOE PF LF 6.5 STRL

## (undated) DEVICE — BW-412T DISP COMBO CLEANING BRUSH: Brand: SINGLE USE COMBINATION CLEANING BRUSH

## (undated) DEVICE — INTENDED FOR TISSUE SEPARATION, AND OTHER PROCEDURES THAT REQUIRE A SHARP SURGICAL BLADE TO PUNCTURE OR CUT.: Brand: BARD-PARKER ® CARBON RIB-BACK BLADES

## (undated) DEVICE — SKIN PREP TRAY W/CHG: Brand: MEDLINE INDUSTRIES, INC.

## (undated) DEVICE — SUCTION CANISTER, 3000CC,SAFELINER: Brand: DEROYAL

## (undated) DEVICE — TP NDL SCORPION MULTIFIRE

## (undated) DEVICE — INTENT TO BE USED WITH SUTURE MATERIAL FOR TISSUE CLOSURE: Brand: RICHARD-ALLAN® NEEDLE 1/2 CIRCLE TAPER

## (undated) DEVICE — DRSNG WND GZ CURAD OIL EMULSION 3X3IN STRL

## (undated) DEVICE — ENDOGATOR AUXILIARY WATER JET CONNECTOR: Brand: ENDOGATOR

## (undated) DEVICE — JACKT LAB F/R KNIT CUFF/COLR XLG BLU

## (undated) DEVICE — LAB CORP AGAR SLANT UREA PK/10

## (undated) DEVICE — TBG PENCL TELESCP MEGADYNE SMOKE EVAC 10FT

## (undated) DEVICE — GLV SURG BIOGEL LTX PF 6 1/2

## (undated) DEVICE — PATIENT RETURN ELECTRODE, SINGLE-USE, CONTACT QUALITY MONITORING, ADULT, WITH 9FT CORD, FOR PATIENTS WEIGING OVER 33LBS. (15KG): Brand: MEGADYNE

## (undated) DEVICE — ABL ASP APOLLO RF XL 90D

## (undated) DEVICE — TBG PUMP ARTHSCP MAIN AR6400 16FT

## (undated) DEVICE — TBG ARTHSCP PT W CONN/REDUC 8FT